# Patient Record
Sex: MALE | Race: ASIAN | NOT HISPANIC OR LATINO | Employment: OTHER | URBAN - METROPOLITAN AREA
[De-identification: names, ages, dates, MRNs, and addresses within clinical notes are randomized per-mention and may not be internally consistent; named-entity substitution may affect disease eponyms.]

---

## 2017-04-25 ENCOUNTER — OFFICE VISIT (OUTPATIENT)
Dept: INTERNAL MEDICINE | Facility: CLINIC | Age: 72
End: 2017-04-25
Payer: COMMERCIAL

## 2017-04-25 VITALS
TEMPERATURE: 98.4 F | RESPIRATION RATE: 16 BRPM | HEIGHT: 66 IN | WEIGHT: 131.6 LBS | BODY MASS INDEX: 21.15 KG/M2 | DIASTOLIC BLOOD PRESSURE: 76 MMHG | HEART RATE: 75 BPM | OXYGEN SATURATION: 95 % | SYSTOLIC BLOOD PRESSURE: 138 MMHG

## 2017-04-25 DIAGNOSIS — M17.0 PRIMARY OSTEOARTHRITIS OF BOTH KNEES: Primary | ICD-10-CM

## 2017-04-25 DIAGNOSIS — M25.511 CHRONIC PAIN OF BOTH SHOULDERS: ICD-10-CM

## 2017-04-25 DIAGNOSIS — F33.41 RECURRENT MAJOR DEPRESSIVE DISORDER, IN PARTIAL REMISSION (H): ICD-10-CM

## 2017-04-25 DIAGNOSIS — M25.512 CHRONIC PAIN OF BOTH SHOULDERS: ICD-10-CM

## 2017-04-25 DIAGNOSIS — E11.9 TYPE 2 DIABETES MELLITUS WITHOUT COMPLICATION, WITHOUT LONG-TERM CURRENT USE OF INSULIN (H): ICD-10-CM

## 2017-04-25 DIAGNOSIS — G89.29 CHRONIC PAIN OF BOTH SHOULDERS: ICD-10-CM

## 2017-04-25 PROCEDURE — 99214 OFFICE O/P EST MOD 30 MIN: CPT | Performed by: INTERNAL MEDICINE

## 2017-04-25 RX ORDER — SERTRALINE HYDROCHLORIDE 100 MG/1
100 TABLET, FILM COATED ORAL DAILY
Qty: 90 TABLET | Refills: 3 | Status: SHIPPED | OUTPATIENT
Start: 2017-04-25 | End: 2018-04-21

## 2017-04-25 RX ORDER — NAPROXEN 500 MG/1
500 TABLET ORAL 2 TIMES DAILY PRN
Qty: 30 TABLET | Refills: 1 | Status: SHIPPED | OUTPATIENT
Start: 2017-04-25 | End: 2017-08-01

## 2017-04-25 RX ORDER — ACETAMINOPHEN 325 MG/1
650 TABLET ORAL EVERY 6 HOURS PRN
Qty: 100 TABLET | Refills: 3 | Status: SHIPPED | OUTPATIENT
Start: 2017-04-25 | End: 2023-09-15

## 2017-04-25 NOTE — PROGRESS NOTES
SUBJECTIVE:                                                    Josh Gonzalez is a 71 year old male who presents to clinic today for the following health issues:    left hand by thumb- notice 4-5 weeks no pain    Has a small lump on the left hand - between the thumb and index finger. No pain, no skin change.    Depression Followup    Status since last visit: Stable     See PHQ-9 for current symptoms.  Other associated symptoms: None    Complicating factors:   Significant life event:  No   Current substance abuse:  None  Anxiety or Panic symptoms:  No    PHQ-9  English PHQ-9   Any Language        Has h/o depression. On medical treatment, controlled, no side effects. No depressive symptoms or suicidal ideation.    Has H/O DM. On diet , exercise . Blood sugars are controlled. No parestesias. No hypoglycemias.    Has h/o OA of the knees, back , shoulders. On Tylenol and Naproxen as needed. Controlled.       Amount of exercise or physical activity: None    Problems taking medications regularly: No    Medication side effects: none    Diet: regular (no restrictions)            Problem list and histories reviewed & adjusted, as indicated.  Additional history: as documented    Patient Active Problem List   Diagnosis     Advanced directives, counseling/discussion     Hyperlipidemia LDL goal <160     Health Care Home     Major depression     KULWANT (obstructive sleep apnea)     Gait instability     Mixed incontinence     Type 2 diabetes mellitus without complication, without long-term current use of insulin (H)     History reviewed. No pertinent surgical history.    Social History   Substance Use Topics     Smoking status: Never Smoker     Smokeless tobacco: Never Used     Alcohol use No     Family History   Problem Relation Age of Onset     Unknown/Adopted Mother      Unknown/Adopted Father          Current Outpatient Prescriptions   Medication Sig Dispense Refill     acetaminophen (TYLENOL) 325 MG tablet Take 2 tablets (650 mg)  "by mouth every 6 hours as needed for mild pain 100 tablet 3     naproxen (NAPROSYN) 500 MG tablet Take 1 tablet (500 mg) by mouth 2 times daily as needed for moderate pain 30 tablet 1     sertraline (ZOLOFT) 100 MG tablet Take 1 tablet (100 mg) by mouth daily 90 tablet 3     calcium-vitamin D (CALTRATE) 600-400 MG-UNIT per tablet Take 1 tablet by mouth 2 times daily 180 tablet 3     ORDER FOR DME Equipment being ordered: CPAP supplies (mask, tubing etc.) 1 Device 5     [DISCONTINUED] sertraline (ZOLOFT) 100 MG tablet Take 1 tablet (100 mg) by mouth daily 90 tablet 3       Reviewed and updated as needed this visit by clinical staff  Tobacco  Allergies  Meds  Med Hx  Surg Hx  Fam Hx  Soc Hx      Reviewed and updated as needed this visit by Provider         ROS:  Constitutional, HEENT, cardiovascular, pulmonary, gi and gu systems are negative, except as otherwise noted.    OBJECTIVE:                                                    /76  Pulse 75  Temp 98.4  F (36.9  C) (Oral)  Resp 16  Ht 5' 5.5\" (1.664 m)  Wt 131 lb 9.6 oz (59.7 kg)  SpO2 95%  BMI 21.57 kg/m2  Body mass index is 21.57 kg/(m^2).  GENERAL: healthy, alert and no distress  NECK: no adenopathy, no asymmetry, masses, or scars and thyroid normal to palpation  RESP: lungs clear to auscultation - no rales, rhonchi or wheezes  CV: regular rate and rhythm, normal S1 S2, no S3 or S4, no murmur, click or rub, no peripheral edema and peripheral pulses strong  ABDOMEN: soft, nontender, no hepatosplenomegaly, no masses and bowel sounds normal  MS: no gross musculoskeletal defects noted, no edema  Left hand subcutaneous mobile nodule , 1 cm, non tender in the intertriginous 1-2 fingers area     Diagnostic Test Results:  none      ASSESSMENT/PLAN:                                                      Problem List Items Addressed This Visit     Major depression    Relevant Medications    sertraline (ZOLOFT) 100 MG tablet    Type 2 diabetes mellitus " without complication, without long-term current use of insulin (H)      Other Visit Diagnoses     Primary osteoarthritis of both knees    -  Primary    Relevant Medications    acetaminophen (TYLENOL) 325 MG tablet    naproxen (NAPROSYN) 500 MG tablet    calcium-vitamin D (CALTRATE) 600-400 MG-UNIT per tablet    Chronic pain of both shoulders        Relevant Medications    acetaminophen (TYLENOL) 325 MG tablet    naproxen (NAPROSYN) 500 MG tablet    calcium-vitamin D (CALTRATE) 600-400 MG-UNIT per tablet           Cont treatment   Controlled depression   Controlled pain/ OA  Consider ACE, ARB, statin   Lab in 3 months   Monitor BP     Follow-Up:in 3 months     Ray Sun MD  [facility]

## 2017-04-25 NOTE — MR AVS SNAPSHOT
"              After Visit Summary   4/25/2017    Josh Gonzalez    MRN: [id number]           Patient Information     Date Of Birth          1945        Visit Information        Provider Department      4/25/2017 10:45 AM Ray Sun MD; [employer] [facility]        Today's Diagnoses     Type 2 diabetes mellitus without complication, without long-term current use of insulin (H)    -  1    Primary osteoarthritis of both knees        Chronic pain of both shoulders        Recurrent major depressive disorder, in partial remission (H)          Care Instructions    Fasting for next visit in 3 months         Follow-ups after your visit        Follow-up notes from your care team     Return in about 3 months (around 7/25/2017) for Physical Exam.      Who to contact     If you have questions or need follow up information about today's clinic visit or your schedule please contact [facility] directly at [phone].  Normal or non-critical lab and imaging results will be communicated to you by MyChart, letter or phone within 4 business days after the clinic has received the results. If you do not hear from us within 7 days, please contact the clinic through MyChart or phone. If you have a critical or abnormal lab result, we will notify you by phone as soon as possible.  Submit refill requests through [employer] or call your pharmacy and they will forward the refill request to us. Please allow 3 business days for your refill to be completed.          Additional Information About Your Visit        MyChart Information     AMENDIA lets you send messages to your doctor, view your test results, renew your prescriptions, schedule appointments and more. To sign up, go to [email or web address] . Click on \"Log in\" on the left side of the screen, which will take you to the Welcome page. Then click on \"Sign up Now\" on the right side of the page.     You will be asked to enter " "the access code listed below, as well as some personal information. Please follow the directions to create your username and password.     Your access code is: [email or web address]  Expires: 2017 11:25 AM     Your access code will  in 90 days. If you need help or a new code, please call your [place] clinic or [phone].        Care EveryWhere ID     This is your Care EveryWhere ID. This could be used by other organizations to access your [place] medical records  [id number]        Your Vitals Were     Pulse Temperature Respirations Height Pulse Oximetry BMI (Body Mass Index)    75 98.4  F (36.9  C) (Oral) 16 5' 5.5\" (1.664 m) 95% 21.57 kg/m2       Blood Pressure from Last 3 Encounters:   17 138/76   16 110/66   11/17/15 105/70    Weight from Last 3 Encounters:   17 131 lb 9.6 oz (59.7 kg)   16 131 lb 14.4 oz (59.8 kg)   11/17/15 131 lb (59.4 kg)              Today, you had the following     No orders found for display         Where to get your medicines      These medications were sent to [id number] - [place], [place] - [address]  [address] [place] [address] [address], [place] [address]     Phone:  [phone]     acetaminophen 325 MG tablet    calcium-vitamin D 600-400 MG-UNIT per tablet    naproxen 500 MG tablet    sertraline 100 MG tablet          Primary Care Provider Office Phone # Fax #    Ray Sun [phone]       [facility] [address] [place] [id number]        Thank you!     Thank you for choosing [facility]  for your care. Our goal is always to provide you with excellent care. Hearing back from our patients is one way we can continue to improve our services. Please take a few minutes to complete the written survey that you may receive in the mail after your visit with us. Thank you!             Your Updated Medication List - Protect others around you: Learn how to safely use, store and throw away your " medicines at www.disposemymeds.org.          This list is accurate as of: 4/25/17 11:25 AM.  Always use your most recent med list.                   Brand Name Dispense Instructions for use    acetaminophen 325 MG tablet    TYLENOL    100 tablet    Take 2 tablets (650 mg) by mouth every 6 hours as needed for mild pain       calcium-vitamin D 600-400 MG-UNIT per tablet    CALTRATE    180 tablet    Take 1 tablet by mouth 2 times daily       naproxen 500 MG tablet    NAPROSYN    30 tablet    Take 1 tablet (500 mg) by mouth 2 times daily as needed for moderate pain       order for DME     1 Device    Equipment being ordered: CPAP supplies (mask, tubing etc.)       sertraline 100 MG tablet    ZOLOFT    90 tablet    Take 1 tablet (100 mg) by mouth daily

## 2017-04-25 NOTE — NURSING NOTE
"Chief Complaint   Patient presents with     Mass     left hand by thumb- notice 4-5 weeks no pain     Recheck Medication     refill and depression f/u LOV 07/05/2016       Initial /76  Pulse 75  Temp 98.4  F (36.9  C) (Oral)  Resp 16  Ht 5' 5.5\" (1.664 m)  Wt 131 lb 9.6 oz (59.7 kg)  SpO2 95%  BMI 21.57 kg/m2 Estimated body mass index is 21.57 kg/(m^2) as calculated from the following:    Height as of this encounter: 5' 5.5\" (1.664 m).    Weight as of this encounter: 131 lb 9.6 oz (59.7 kg).  Medication Reconciliation: complete      Db Calix CMA      "

## 2017-04-26 ASSESSMENT — PATIENT HEALTH QUESTIONNAIRE - PHQ9: SUM OF ALL RESPONSES TO PHQ QUESTIONS 1-9: 11

## 2017-05-02 ENCOUNTER — CARE COORDINATION (OUTPATIENT)
Dept: GERIATRIC MEDICINE | Facility: CLINIC | Age: 72
End: 2017-05-02

## 2017-05-02 NOTE — PROGRESS NOTES
Per Olive, arranged transportation thru [facility] for the below appt:  Appt Date: 5/6, 5/13, 5/20, 5/27  Clinic Name:  [facility] - [address] [place] [address]  Transportation Provider: Town, Yellow, Kim or Lucas   time:  9:15am - 9:45am    Notified Sruthi,  of  time.    Sangita Crowe  Case Management Specialist  [facility]  [phone]

## 2017-05-25 ENCOUNTER — CARE COORDINATION (OUTPATIENT)
Dept: GERIATRIC MEDICINE | Facility: CLINIC | Age: 72
End: 2017-05-25

## 2017-05-25 NOTE — PROGRESS NOTES
[facility] Six-Month Telephone Assessment    6 month telephone assessment completed on 5/25/17 with client's wife Lissette..    ER visits: No  Hospitalizations: No  TCU stays: No  Significant health status changes: no change.  Has annual wellness visit scheduled 8/1.  Gave clients' wife this information..  Client is getting acupuncture for pain.  Falls/Injuries: Yes: Wife reports one fall last month  ADL/IADL changes: No  Changes in services: No    Goals: See POC in chart for goal progress documentation.     Caregiver Assessment follow up:  na    Will see client in 6 months for an annual health risk assessment.   Encouraged client to call CM with any questions or concerns in the meantime.     VIRGINIA Durbin, CCM  [facility]   Tel [phone]  Fax [phone]

## 2017-06-30 ENCOUNTER — CARE COORDINATION (OUTPATIENT)
Dept: GERIATRIC MEDICINE | Facility: CLINIC | Age: 72
End: 2017-06-30

## 2017-06-30 NOTE — PROGRESS NOTES
Received voice mail message from Divina at [facility] stating client needs a two wheeled walker (not deluxe walker just standard with 2 wheels) as client is unsteady and no longer safe with his cane.  Left message back for Divina letting her know that CC will order and vendor will get order from the PCP.  Emailed order to [facility].  VIRGINIA Durbin, [facility]   Tel [phone]  Fax [phone]

## 2017-07-03 ENCOUNTER — TELEPHONE (OUTPATIENT)
Dept: INTERNAL MEDICINE | Facility: CLINIC | Age: 72
End: 2017-07-03

## 2017-07-03 NOTE — TELEPHONE ENCOUNTER
Fax received from [facility] for review and signature.  Put in Dr. juan's in basket in Dr. Sun's absence,

## 2017-07-10 NOTE — TELEPHONE ENCOUNTER
Hi,  I am client's  from [facility].  I ordered the walker for this client.  I was called by his PCA and home care agency stating that he was unsteady with his cane and they had tried a wheeled walker with him and they said he was more steady with it.  Just wanted to let you know in case you are wondering where this request came from.  Thanks  VIRGINIA Durbin, [facility]   Tel [phone]  Fax [phone]

## 2017-07-17 ENCOUNTER — TELEPHONE (OUTPATIENT)
Dept: INTERNAL MEDICINE | Facility: CLINIC | Age: 72
End: 2017-07-17

## 2017-07-17 NOTE — TELEPHONE ENCOUNTER
Fax received from [facility] for review and signature.  Put in Dr. Ahumada's in basket in Dr. Sun's absence.

## 2017-07-27 NOTE — PROGRESS NOTES
Per APA, 2WW was delivered 7/21.  CC notified.  Elenita Lowery  Case Management Specialist  [facility]  [phone]

## 2017-08-01 ENCOUNTER — OFFICE VISIT (OUTPATIENT)
Dept: INTERNAL MEDICINE | Facility: CLINIC | Age: 72
End: 2017-08-01
Payer: COMMERCIAL

## 2017-08-01 VITALS
TEMPERATURE: 97.9 F | HEART RATE: 64 BPM | DIASTOLIC BLOOD PRESSURE: 78 MMHG | OXYGEN SATURATION: 99 % | BODY MASS INDEX: 20.09 KG/M2 | HEIGHT: 66 IN | SYSTOLIC BLOOD PRESSURE: 144 MMHG | WEIGHT: 125 LBS

## 2017-08-01 DIAGNOSIS — E11.9 TYPE 2 DIABETES MELLITUS WITHOUT COMPLICATION, WITHOUT LONG-TERM CURRENT USE OF INSULIN (H): ICD-10-CM

## 2017-08-01 DIAGNOSIS — G89.29 CHRONIC PAIN OF BOTH SHOULDERS: ICD-10-CM

## 2017-08-01 DIAGNOSIS — R26.81 GAIT INSTABILITY: ICD-10-CM

## 2017-08-01 DIAGNOSIS — M17.0 PRIMARY OSTEOARTHRITIS OF BOTH KNEES: ICD-10-CM

## 2017-08-01 DIAGNOSIS — F33.41 RECURRENT MAJOR DEPRESSIVE DISORDER, IN PARTIAL REMISSION (H): ICD-10-CM

## 2017-08-01 DIAGNOSIS — Z00.00 ROUTINE GENERAL MEDICAL EXAMINATION AT A HEALTH CARE FACILITY: Primary | ICD-10-CM

## 2017-08-01 DIAGNOSIS — Z12.5 SCREENING FOR PROSTATE CANCER: ICD-10-CM

## 2017-08-01 DIAGNOSIS — M25.512 CHRONIC PAIN OF BOTH SHOULDERS: ICD-10-CM

## 2017-08-01 DIAGNOSIS — R97.20 ELEVATED PROSTATE SPECIFIC ANTIGEN (PSA): ICD-10-CM

## 2017-08-01 DIAGNOSIS — M25.511 CHRONIC PAIN OF BOTH SHOULDERS: ICD-10-CM

## 2017-08-01 LAB
ERYTHROCYTE [DISTWIDTH] IN BLOOD BY AUTOMATED COUNT: 15.3 % (ref 10–15)
HBA1C MFR BLD: 5.8 % (ref 4.3–6)
HCT VFR BLD AUTO: 46.6 % (ref 40–53)
HGB BLD-MCNC: 14.6 G/DL (ref 13.3–17.7)
MCH RBC QN AUTO: 24.4 PG (ref 26.5–33)
MCHC RBC AUTO-ENTMCNC: 31.3 G/DL (ref 31.5–36.5)
MCV RBC AUTO: 78 FL (ref 78–100)
PLATELET # BLD AUTO: 250 10E9/L (ref 150–450)
RBC # BLD AUTO: 5.99 10E12/L (ref 4.4–5.9)
WBC # BLD AUTO: 6.5 10E9/L (ref 4–11)

## 2017-08-01 PROCEDURE — 82043 UR ALBUMIN QUANTITATIVE: CPT | Performed by: INTERNAL MEDICINE

## 2017-08-01 PROCEDURE — 80061 LIPID PANEL: CPT | Performed by: INTERNAL MEDICINE

## 2017-08-01 PROCEDURE — 99397 PER PM REEVAL EST PAT 65+ YR: CPT | Performed by: INTERNAL MEDICINE

## 2017-08-01 PROCEDURE — 36415 COLL VENOUS BLD VENIPUNCTURE: CPT | Performed by: INTERNAL MEDICINE

## 2017-08-01 PROCEDURE — 84443 ASSAY THYROID STIM HORMONE: CPT | Performed by: INTERNAL MEDICINE

## 2017-08-01 PROCEDURE — 99213 OFFICE O/P EST LOW 20 MIN: CPT | Mod: 25 | Performed by: INTERNAL MEDICINE

## 2017-08-01 PROCEDURE — 85027 COMPLETE CBC AUTOMATED: CPT | Performed by: INTERNAL MEDICINE

## 2017-08-01 PROCEDURE — 83036 HEMOGLOBIN GLYCOSYLATED A1C: CPT | Performed by: INTERNAL MEDICINE

## 2017-08-01 PROCEDURE — G0103 PSA SCREENING: HCPCS | Performed by: INTERNAL MEDICINE

## 2017-08-01 PROCEDURE — 80053 COMPREHEN METABOLIC PANEL: CPT | Performed by: INTERNAL MEDICINE

## 2017-08-01 RX ORDER — NAPROXEN 500 MG/1
500 TABLET ORAL 2 TIMES DAILY PRN
Qty: 30 TABLET | Refills: 3 | Status: SHIPPED | OUTPATIENT
Start: 2017-08-01 | End: 2019-11-18

## 2017-08-01 NOTE — LETTER
[facility]  [address], [address]  [place] [address]  [phone]        August 9, 2017    Josh Jonatanrachelle Aggarwali  [address] [address] [address]          Dear Mr. Josh Gonzalez:      The results of your recent labs shows you have slightly elevated Cholesterol. Please, keep with low fat diet and regular exercise. PSA was elevated ( prostate exam), please, follow up with Urologist ( referral was mailed, please call the number for an appointment). The rest of the results were normal.          Sincerely,        Ray Sun M.D.

## 2017-08-01 NOTE — PROGRESS NOTES
SUBJECTIVE:   Josh Gonzalez is a 71 year old male who presents for Preventive Visit.      Are you in the first 12 months of your Medicare Part B coverage?  No    Healthy Habits:    Do you get at least three servings of calcium containing foods daily (dairy, green leafy vegetables, etc.)? Yes, greens only    Amount of exercise or daily activities, outside of work: daily walk    Problems taking medications regularly No    Medication side effects: No    Have you had an eye exam in the past two years? no    Do you see a dentist twice per year? Once a year    Do you have sleep apnea, excessive snoring or daytime drowsiness?yes, sleep Apnea    COGNITIVE SCREEN  1) Repeat 3 items (Banana, Sunrise, Chair)    2) Clock draw: NORMAL  3) 3 item recall: Recalls NO objects   Results: 0 items recalled: PROBABLE COGNITIVE IMPAIRMENT, **INFORM PROVIDER**    Mini-CogTM Copyright S Dilan. Licensed by the author for use in [facility]; reprinted with permission (mckayla@[facility]). All rights reserved.              PROBLEMS TO ADD ON...  Has H/O DM. On diet , exercise . Blood sugars are controlled. No parestesias. No hypoglycemias.  Has h/o depression. On medical treatment, controlled, no side effects.has chronic  depressive symptoms , no suicidal ideation.  Has h/o OA of the knees, shoulders. Has poor balance and h/o falls. Uses a cane. Has restricted ambulation because of knee pains.   Concern for declining memory, poor appetite. Mild weight loss.     Reviewed and updated as needed this visit by clinical staff         Reviewed and updated as needed this visit by Provider      Social History   Substance Use Topics     Smoking status: Never Smoker     Smokeless tobacco: Never Used     Alcohol use No       The patient does not drink >3 drinks per day nor >7 drinks per week.    Today's PHQ-2 Score:   PHQ-2 ( 1999 [employer]) 4/25/2017 12/13/2016   Q1: Little interest or pleasure in doing things 0 1   Q2: Feeling down, depressed or  hopeless 0 1   PHQ-2 Score 0 2     Assess lab   Handicapped papers filled in     Do you feel safe in your environment - Yes    Do you have a Health Care Directive?: No: Advance care planning was reviewed with patient; patient declined at this time.      Current providers sharing in care for this patient include: Patient Care Team:  Ray Sun MD as PCP - General (Internal Medicine)  Rochelle Adam LSW as   Reyna Sutton as Other (see comments)  Chyna Toth      Hearing impairment: No    Ability to successfully perform activities of daily living: Yes, no assistance needed     Fall risk:         Home safety:  none identified      The following health maintenance items are reviewed in Epic and correct as of today:Health Maintenance   Topic Date Due     FOOT EXAM Q1 YEAR  09/24/1946     EYE EXAM Q1 YEAR  09/24/1946     MICROALBUMIN Q1 YEAR  09/24/1946     DEPRESSION ACTION PLAN Q1 YR  09/24/1962     AORTIC ANEURYSM SCREENING (SYSTEM ASSIGNED)  09/24/2009     LIPID MONITORING Q1 YEAR  11/01/2015     A1C Q6 MO  01/05/2017     CREATININE Q1 YEAR  07/05/2017     INFLUENZA VACCINE (SYSTEM ASSIGNED)  09/01/2017     PHQ-9 Q6 MONTHS  10/25/2017     TSH W/ FREE T4 REFLEX Q2 YEAR  11/17/2017     FALL RISK ASSESSMENT  04/25/2018     COLONOSCOPY Q10 YR  11/01/2018     ADVANCE DIRECTIVE PLANNING Q5 YRS  12/13/2021     TETANUS IMMUNIZATION (SYSTEM ASSIGNED)  01/11/2022     PNEUMOCOCCAL  Completed     Labs reviewed in EPIC    Pneumonia Vaccine:Adults age 65+ who received Pneumovax (PPSV23) at 65 years or older: Should be given PCV13 > 1 year after their most recent PPSV23    ROS:  C: NEGATIVE for fever, chills, change in weight  I: NEGATIVE for worrisome rashes, moles or lesions  E: NEGATIVE for vision changes or irritation  E/M: NEGATIVE for ear, mouth and throat problems  R: NEGATIVE for significant cough or SOB  B: NEGATIVE for masses, tenderness or discharge  CV: NEGATIVE for chest pain, palpitations or  "peripheral edema  GI: NEGATIVE for nausea, abdominal pain, heartburn, or change in bowel habits  : NEGATIVE for frequency, dysuria, or hematuria  M: +for significant arthralgias in knees   N: NEGATIVE for weakness, dizziness or paresthesias, + falls   E: NEGATIVE for temperature intolerance, skin/hair changes  H: NEGATIVE for bleeding problems  P: NEGATIVE for changes in mood or affect, + memory problems and depressed mood     OBJECTIVE:   There were no vitals taken for this visit. Estimated body mass index is 21.57 kg/(m^2) as calculated from the following:    Height as of 4/25/17: 5' 5.5\" (1.664 m).    Weight as of 4/25/17: 131 lb 9.6 oz (59.7 kg).  EXAM:   GENERAL: weak, frail, alert and no distress  EYES: Eyes grossly normal to inspection, PERRL and conjunctivae and sclerae normal  HENT: ear canals and TM's normal, nose and mouth without ulcers or lesions  NECK: no adenopathy, no asymmetry, masses, or scars and thyroid normal to palpation  RESP: lungs clear to auscultation - no rales, rhonchi or wheezes  CV: regular rate and rhythm, normal S1 S2, no S3 or S4, no murmur, click or rub, no peripheral edema and peripheral pulses strong  ABDOMEN: soft, nontender, no hepatosplenomegaly, no masses and bowel sounds normal  MS: no gross musculoskeletal defects noted, no edema, knees and shoulders palpatory tenderness   SKIN: no suspicious lesions or rashes  NEURO: generalized weakness, impaired short term memory , postural instability uses a cane   PSYCH: mentation appears normal    ASSESSMENT / PLAN:       ICD-10-CM    1. Routine general medical examination at a health care facility Z00.00 CBC with platelets     Comprehensive metabolic panel     TSH with free T4 reflex     Lipid panel reflex to direct LDL     Prostate spec antigen screen     Hemoglobin A1c     Albumin Random Urine Quantitative   2. Primary osteoarthritis of both knees M17.0 naproxen (NAPROSYN) 500 MG tablet     cholecalciferol (VITAMIN D) 1000 UNIT " "tablet     [facility]   3. Chronic pain of both shoulders M25.512 naproxen (NAPROSYN) 500 MG tablet    G89.29 cholecalciferol (VITAMIN D) 1000 UNIT tablet    M25.511 [facility]   4. Recurrent major depressive disorder, in partial remission (H) F33.41 [facility]   5. Type 2 diabetes mellitus without complication, without long-term current use of insulin (H) E11.9 CBC with platelets     Comprehensive metabolic panel     TSH with free T4 reflex     Lipid panel reflex to direct LDL     Hemoglobin A1c     Albumin Random Urine Quantitative     [facility]   6. Screening for prostate cancer Z12.5 Prostate spec antigen screen   7. Gait instability R26.81 OFFICE/OUTPT VISIT,EST,[facility] III       End of Life Planning:  Patient currently has an advanced directive: Yes.  Practitioner is supportive of decision.    COUNSELING:  Reviewed preventive health counseling, as reflected in patient instructions       Regular exercise       Healthy diet/nutrition       Vision screening       Hearing screening       Dental care       Colon cancer screening       Prostate cancer screening        Estimated body mass index is 21.57 kg/(m^2) as calculated from the following:    Height as of 4/25/17: 5' 5.5\" (1.664 m).    Weight as of 4/25/17: 131 lb 9.6 oz (59.7 kg).     reports that he has never smoked. He has never used smokeless tobacco.        Appropriate preventive services were discussed with this patient, including applicable screening as appropriate for cardiovascular disease, diabetes, osteopenia/osteoporosis, and glaucoma.  As appropriate for age/gender, discussed screening for colorectal cancer, prostate cancer, breast cancer, and cervical cancer. Checklist reviewing preventive services available has been given to the patient.    Reviewed patients plan of care and provided an AVS. The Intermediate Care Plan ( asthma action plan, low back pain action plan, " and migraine action plan) for Man meets the Care Plan requirement. This Care Plan has been established and reviewed with the Patient.    Counseling Resources:  ATP IV Guidelines  Pooled Cohorts Equation Calculator  Breast Cancer Risk Calculator  FRAX Risk Assessment  ICSI Preventive Guidelines  Dietary Guidelines for Americans, 2010  USDA's MyPlate  ASA Prophylaxis  Lung CA Screening    Ray Sun MD  [facility]

## 2017-08-01 NOTE — NURSING NOTE
"Chief Complaint   Patient presents with     Wellness Visit     Fasting Px       Initial /78  Pulse 64  Temp 97.9  F (36.6  C) (Oral)  Ht 5' 5.5\" (1.664 m)  Wt 125 lb (56.7 kg)  SpO2 99%  BMI 20.48 kg/m2 Estimated body mass index is 20.48 kg/(m^2) as calculated from the following:    Height as of this encounter: 5' 5.5\" (1.664 m).    Weight as of this encounter: 125 lb (56.7 kg).  Medication Reconciliation: complete   Jen Carvajal MA      "

## 2017-08-01 NOTE — MR AVS SNAPSHOT
After Visit Summary   8/1/2017    Josh Gonzalez    MRN: [id number]           Patient Information     Date Of Birth          1945        Visit Information        Provider Department      8/1/2017 9:45 AM Ray Sun MD; [employer] [facility]        Today's Diagnoses     Routine general medical examination at a health care facility    -  1    Primary osteoarthritis of both knees        Chronic pain of both shoulders        Recurrent major depressive disorder, in partial remission (H)        Type 2 diabetes mellitus without complication, without long-term current use of insulin (H)        Screening for prostate cancer        Gait instability          Care Instructions      Preventive Health Recommendations:       Male Ages 65 and over    Yearly exam:             See your health care provider every year in order to  o   Review health changes.   o   Discuss preventive care.    o   Review your medicines if your doctor has prescribed any.    Talk with your health care provider about whether you should have a test to screen for prostate cancer (PSA).    Every 3 years, have a diabetes test (fasting glucose). If you are at risk for diabetes, you should have this test more often.    Every 5 years, have a cholesterol test. Have this test more often if you are at risk for high cholesterol or heart disease.     Every 10 years, have a colonoscopy. Or, have a yearly FIT test (stool test). These exams will check for colon cancer.    Talk to with your health care provider about screening for Abdominal Aortic Aneurysm if you have a family history of AAA or have a history of smoking.  Shots:     Get a flu shot each year.     Get a tetanus shot every 10 years.     Talk to your doctor about your pneumonia vaccines. There are now two you should receive - Pneumovax (PPSV 23) and Prevnar (PCV 13).    Talk to your doctor about a shingles vaccine.     Talk to your doctor about the  "hepatitis B vaccine.  Nutrition:     Eat at least 5 servings of fruits and vegetables each day.     Eat whole-grain bread, whole-wheat pasta and brown rice instead of white grains and rice.     Talk to your doctor about Calcium and Vitamin D.   Lifestyle    Exercise for at least 150 minutes a week (30 minutes a day, 5 days a week). This will help you control your weight and prevent disease.     Limit alcohol to one drink per day.     No smoking.     Wear sunscreen to prevent skin cancer.     See your dentist every six months for an exam and cleaning.     See your eye doctor every 1 to 2 years to screen for conditions such as glaucoma, macular degeneration and cataracts.          Follow-ups after your visit        Who to contact     If you have questions or need follow up information about today's clinic visit or your schedule please contact [facility] directly at [phone].  Normal or non-critical lab and imaging results will be communicated to you by iStreamPlanethart, letter or phone within 4 business days after the clinic has received the results. If you do not hear from us within 7 days, please contact the clinic through iStreamPlanethart or phone. If you have a critical or abnormal lab result, we will notify you by phone as soon as possible.  Submit refill requests through Qwickly or call your pharmacy and they will forward the refill request to us. Please allow 3 business days for your refill to be completed.          Additional Information About Your Visit        Qwickly Information     Qwickly lets you send messages to your doctor, view your test results, renew your prescriptions, schedule appointments and more. To sign up, go to [email or web address] . Click on \"Log in\" on the left side of the screen, which will take you to the Welcome page. Then click on \"Sign up Now\" on the right side of the page.     You will be asked to enter the access code listed below, as well as some personal information. Please " "follow the directions to create your username and password.     Your access code is: [email or web address]  Expires: 10/30/2017 10:49 AM     Your access code will  in 90 days. If you need help or a new code, please call your [place] clinic or [phone].        Care EveryWhere ID     This is your Care EveryWhere ID. This could be used by other organizations to access your [place] medical records  [id number]        Your Vitals Were     Pulse Temperature Height Pulse Oximetry BMI (Body Mass Index)       64 97.9  F (36.6  C) (Oral) 5' 5.5\" (1.664 m) 99% 20.48 kg/m2        Blood Pressure from Last 3 Encounters:   17 144/78   17 138/76   16 110/66    Weight from Last 3 Encounters:   17 125 lb (56.7 kg)   17 131 lb 9.6 oz (59.7 kg)   16 131 lb 14.4 oz (59.8 kg)              We Performed the Following     Albumin Random Urine Quantitative     CBC with platelets     Comprehensive metabolic panel     Hemoglobin A1c     Lipid panel reflex to direct LDL     Prostate spec antigen screen     TSH with free T4 reflex          Today's Medication Changes          These changes are accurate as of: 17 10:49 AM.  If you have any questions, ask your nurse or doctor.               Start taking these medicines.        Dose/Directions    cholecalciferol 1000 UNIT tablet   Commonly known as:  vitamin D   Used for:  Primary osteoarthritis of both knees, Chronic pain of both shoulders   Started by:  Ray Sun MD        Dose:  1000 Units   Take 1 tablet (1,000 Units) by mouth daily   Quantity:  100 tablet   Refills:  3         Stop taking these medicines if you haven't already. Please contact your care team if you have questions.     calcium-vitamin D 600-400 MG-UNIT per tablet   Commonly known as:  CALTRATE   Stopped by:  Ray Sun MD                Where to get your medicines      These medications were sent to [id number] [place], [place] - [address]  [address], [facility] [address]     Phone:  [phone]     cholecalciferol 1000 UNIT tablet    naproxen 500 MG tablet                Primary Care Provider Office Phone # Fax #    Ray Sun [phone]       [facility] [address]  [facility] [address]        Equal Access to Services     ASHLEY SOLOMON : Hadii aad ku hadasho Soomaali, waaxda luqadaha, qaybta kaalmada adeegyada, waxay idiin hayaan adeeg kharash la'aan ah. So [facility] [phone].    ATENCIÓN: Si habla español, tiene a lilly disposición servicios gratuitos de asistencia lingüística. Llame al [phone].    We comply with applicable federal civil rights laws and [place] laws. We do not discriminate on the basis of race, color, national origin, age, disability sex, sexual orientation or gender identity.            Thank you!     Thank you for choosing [facility]  for your care. Our goal is always to provide you with excellent care. Hearing back from our patients is one way we can continue to improve our services. Please take a few minutes to complete the written survey that you may receive in the mail after your visit with us. Thank you!             Your Updated Medication List - Protect others around you: Learn how to safely use, store and throw away your medicines at www.disposemymeds.org.          This list is accurate as of: 8/1/17 10:49 AM.  Always use your most recent med list.                   Brand Name Dispense Instructions for use Diagnosis    acetaminophen 325 MG tablet    TYLENOL    100 tablet    Take 2 tablets (650 mg) by mouth every 6 hours as needed for mild pain    Primary osteoarthritis of both knees, Chronic pain of both shoulders       cholecalciferol 1000 UNIT tablet    vitamin D    100 tablet    Take 1 tablet (1,000 Units) by mouth daily    Primary osteoarthritis of both knees, Chronic pain of both shoulders       naproxen 500 MG tablet    NAPROSYN    30 tablet    Take 1  tablet (500 mg) by mouth 2 times daily as needed for moderate pain    Primary osteoarthritis of both knees, Chronic pain of both shoulders       order for DME     1 Device    Equipment being ordered: CPAP supplies (mask, tubing etc.)    Sleep apnea       sertraline 100 MG tablet    ZOLOFT    90 tablet    Take 1 tablet (100 mg) by mouth daily    Recurrent major depressive disorder, in partial remission (H)

## 2017-08-02 LAB
ALBUMIN SERPL-MCNC: 3.9 G/DL (ref 3.4–5)
ALP SERPL-CCNC: 81 U/L (ref 40–150)
ALT SERPL W P-5'-P-CCNC: 20 U/L (ref 0–70)
ANION GAP SERPL CALCULATED.3IONS-SCNC: 6 MMOL/L (ref 3–14)
AST SERPL W P-5'-P-CCNC: 21 U/L (ref 0–45)
BILIRUB SERPL-MCNC: 0.7 MG/DL (ref 0.2–1.3)
BUN SERPL-MCNC: 15 MG/DL (ref 7–30)
CALCIUM SERPL-MCNC: 9.5 MG/DL (ref 8.5–10.1)
CHLORIDE SERPL-SCNC: 103 MMOL/L (ref 94–109)
CHOLEST SERPL-MCNC: 218 MG/DL
CO2 SERPL-SCNC: 31 MMOL/L (ref 20–32)
CREAT SERPL-MCNC: 1.17 MG/DL (ref 0.66–1.25)
CREAT UR-MCNC: 26 MG/DL
GFR SERPL CREATININE-BSD FRML MDRD: 61 ML/MIN/1.7M2
GLUCOSE SERPL-MCNC: 92 MG/DL (ref 70–99)
HDLC SERPL-MCNC: 58 MG/DL
LDLC SERPL CALC-MCNC: 125 MG/DL
MICROALBUMIN UR-MCNC: 12 MG/L
MICROALBUMIN/CREAT UR: 46.95 MG/G CR (ref 0–17)
NONHDLC SERPL-MCNC: 160 MG/DL
POTASSIUM SERPL-SCNC: 4.5 MMOL/L (ref 3.4–5.3)
PROT SERPL-MCNC: 8.2 G/DL (ref 6.8–8.8)
PSA SERPL-ACNC: 5.14 UG/L (ref 0–4)
SODIUM SERPL-SCNC: 140 MMOL/L (ref 133–144)
TRIGL SERPL-MCNC: 176 MG/DL
TSH SERPL DL<=0.005 MIU/L-ACNC: 1.35 MU/L (ref 0.4–4)

## 2017-08-02 ASSESSMENT — PATIENT HEALTH QUESTIONNAIRE - PHQ9: SUM OF ALL RESPONSES TO PHQ QUESTIONS 1-9: 19

## 2017-09-11 ENCOUNTER — CARE COORDINATION (OUTPATIENT)
Dept: GERIATRIC MEDICINE | Facility: CLINIC | Age: 72
End: 2017-09-11

## 2017-09-11 NOTE — PROGRESS NOTES
Update on message sent below:  Dr Sun,   [facility] received your prescription for the Ensure but it only had a signature.  The documentation was not completed.  In addition,  [facility] has now decided to no longer supply nutritional products so you will be getting a new request from [employer].  I have spoke with Man and suggested he make an appointment to see you as he says he is losing weight and unable to eat.  Please let me know if you have questions.  Thank you  VIRGINIA Durbin, [facility]   Tel [phone]  Fax [phone]      Dr. Sun,  I am Josh Gonzalez's  from [facility].  He has requested Ensure.  He states he is losing weight due to dental work.  He currently is without teeth preparing to get dentures.  I ordered the Ensure from [facility].  They will be contacting you for orders.  Just wanted to make you aware of this.  Let me know if you have questions.  Thanks so much  VIRGINIA Durbin, [facility]   Tel [phone]  Fax [phone]

## 2017-09-11 NOTE — PROGRESS NOTES
Received voice mail message from [employer]  indicating client is losing weight-mainly due to not having any teeth while he is in the process of getting dentures.  He would like to get Ensure.  Ordered Ensure from [facility]: 1 per day-half chocolate and half strawberry.  Called and spoke with wife with [employer] phone  and updated her re: above.  Told her that [facility] will need a order from the PCP prior to supplying it.  CM sent EPIC note to PCP.  VIRGINIA Durbin, [facility]   Tel [phone]  Fax [phone]

## 2017-09-12 ENCOUNTER — OFFICE VISIT (OUTPATIENT)
Dept: UROLOGY | Facility: CLINIC | Age: 72
End: 2017-09-12
Payer: COMMERCIAL

## 2017-09-12 ENCOUNTER — TELEPHONE (OUTPATIENT)
Dept: INTERNAL MEDICINE | Facility: CLINIC | Age: 72
End: 2017-09-12

## 2017-09-12 VITALS
SYSTOLIC BLOOD PRESSURE: 110 MMHG | HEIGHT: 66 IN | HEART RATE: 60 BPM | BODY MASS INDEX: 19.29 KG/M2 | DIASTOLIC BLOOD PRESSURE: 60 MMHG | WEIGHT: 120 LBS

## 2017-09-12 DIAGNOSIS — R97.20 ELEVATED PROSTATE SPECIFIC ANTIGEN (PSA): Primary | ICD-10-CM

## 2017-09-12 DIAGNOSIS — R35.0 URINARY FREQUENCY: ICD-10-CM

## 2017-09-12 LAB
ALBUMIN UR-MCNC: NEGATIVE MG/DL
APPEARANCE UR: CLEAR
BILIRUB UR QL STRIP: NEGATIVE
COLOR UR AUTO: YELLOW
GLUCOSE UR STRIP-MCNC: NEGATIVE MG/DL
HGB UR QL STRIP: NEGATIVE
KETONES UR STRIP-MCNC: NEGATIVE MG/DL
LEUKOCYTE ESTERASE UR QL STRIP: NEGATIVE
NITRATE UR QL: NEGATIVE
PH UR STRIP: 7 PH (ref 5–7)
RESIDUAL VOLUME (RV) (EXTERNAL): 190
SOURCE: NORMAL
SP GR UR STRIP: 1.01 (ref 1–1.03)
UROBILINOGEN UR STRIP-ACNC: 0.2 EU/DL (ref 0.2–1)

## 2017-09-12 PROCEDURE — 51798 US URINE CAPACITY MEASURE: CPT | Performed by: UROLOGY

## 2017-09-12 PROCEDURE — 99203 OFFICE O/P NEW LOW 30 MIN: CPT | Mod: 25 | Performed by: UROLOGY

## 2017-09-12 PROCEDURE — 81003 URINALYSIS AUTO W/O SCOPE: CPT | Performed by: UROLOGY

## 2017-09-12 RX ORDER — TAMSULOSIN HYDROCHLORIDE 0.4 MG/1
0.4 CAPSULE ORAL DAILY
Qty: 30 CAPSULE | Refills: 3 | Status: SHIPPED | OUTPATIENT
Start: 2017-09-12 | End: 2017-12-27

## 2017-09-12 ASSESSMENT — PAIN SCALES - GENERAL: PAINLEVEL: MILD PAIN (2)

## 2017-09-12 NOTE — TELEPHONE ENCOUNTER
Fax received from [facility] for review and signature.  Put in Dr. Eckert's in basket.

## 2017-09-12 NOTE — LETTER
9/12/2017      RE: Josh Gonzalez  [address] [address]             Chief Complaint:   Elevated PSA         Consult or Referral:     MrShazia Gonzalez is a 71 year old male seen in consultation from Dr. Sun.         History of Present Illness:   Josh Gonzalez is a very pleasant 71 year old male who presents with a history of Elevated PSA (Prostate Specific Antigen).  Patient has slowly progressive lower urinary tract symptoms. Notes weak stream, incomplete emptying, urgency frequency with occasional urge incontinence. Nocturia x 3-4. No hematuria. No dysuria. No previous UTIs. No urologic history. Noted to have elevated PSA at time of last physical.         Past Medical History:     Past Medical History:   Diagnosis Date     Depression      Mixed incontinence 12/20/2016     Myalgia      KULWANT on CPAP      Postnasal drip      ROTATOR CUFF (CAPSULE) SPRAIN           Past Surgical History:   History reviewed. No pertinent surgical history.         Medications     Current Outpatient Prescriptions   Medication     naproxen (NAPROSYN) 500 MG tablet     cholecalciferol (VITAMIN D) 1000 UNIT tablet     acetaminophen (TYLENOL) 325 MG tablet     sertraline (ZOLOFT) 100 MG tablet     ORDER FOR DME     No current facility-administered medications for this visit.           Family History:     Family History   Problem Relation Age of Onset     Unknown/Adopted Mother      Unknown/Adopted Father    No history of  malignancy  No family history prostate cancer         Social History:     Social History     Social History     Marital status:      Spouse name: N/A     Number of children: 4     Years of education: N/A     Occupational History      Unemployed     Social History Main Topics     Smoking status: Former Smoker     Types: Cigarettes     Quit date: 9/12/2001     Smokeless tobacco: Never Used     Alcohol use No     Drug use: No     Sexual activity: No     Other Topics Concern     Blood  "Transfusions No     Caffeine Concern No     Occupational Exposure No     Hobby Hazards No     Sleep Concern No     Stress Concern No     Weight Concern No     Special Diet No     Back Care Yes     LBP- sciatica      Exercise Yes     walking      Seat Belt Yes     Social History Narrative          Allergies:   Review of patient's allergies indicates no known allergies.         Review of Systems:  From intake questionnaire     Negative 14 system review except as noted on HPI, nurse's note.         Physical Exam:     Patient is a 71 year old  male   Vitals: Blood pressure 110/60, pulse 60, height 1.664 m (5' 5.5\"), weight 54.4 kg (120 lb).  General Appearance Adult: Body mass index is 19.67 kg/(m^2).,   Alert, no acute distress, oriented  HENT: throat/mouth:normal, good dentition  Neck: No adenopathy,masses or thyromegaly  Lungs: no respiratory distress, or pursed lip breathing  Heart: No obvious jugular venous distension present  Abdomen: soft, nontender, no organomegaly or masses,   Musculoskeltal: extremities normal, no peripheral edema  Skin: no suspicious lesions or rashes  Neuro: Alert, oriented, speech and mentation normal  Psych: affect and mood normal  Gait: Normal  : penis, scrotum, testes normal  JUAN anodular, symmetric - 40 cc    PVR = 190 ml      Labs and Pathology:    I reviewed all applicable laboratory and pathology data and went over findings with patient  Significant for   Lab Results   Component Value Date    CR 1.17 08/01/2017    CR 1.02 07/05/2016    CR 1.12 08/04/2015    CR 1.17 11/01/2014    CR 1.31 09/04/2013     PSA   Date Value Ref Range Status   08/01/2017 5.14 (H) 0 - 4 ug/L Final     Comment:     Assay Method:  Chemiluminescence using Siemens Vista analyzer   11/01/2014 3.74 0 - 4 ug/L Final   04/02/2013 2.53 0 - 4 ug/L Final   11/29/2011 2.16 0 - 4 ug/L Final       Outside and Past Medical records:    I spent 10 minutes reviewing outside and past medical records.         Assessment and " Plan:     Assessment: 71 year old male with BPH and LUTS as well as elevated PSA. Discussed natural history of BPH and treatment options. Patient would like trial of tamsulosin. Discussed risks/benefits/side effects of medication.    We subsequently had a long discussion about the utility of PSA screening.  We talked about the Pros and the Cons.  We discussed the findings of the PLCO and EORTC studies.  We discussed the results of the Bill-[place]el[place] Scandinavian trial of treatment vs. Observation in clinically detected prostate cancer as well as the results of the PIVOT trial in the context of screen-detected cancer.  We discussed the recommendations by the AUA, ASC and USPTSF. We also discussed the significant (2-6%) and rising risk of biopsy associated sepsis.    We plugged his numbers into the PCPT individualized risk calculator and found   the risk of a negative biopsy is 69%  The risk of low risk cancer is 21%  The risk of intermediate to high risk is 10%    In the end given the overall relatively low PSA and normal JUAN, we felt that there was not strong evidence that a biopsy would be beneficial at this point, understanding that there is a small chance that a biologically important cancer may be missed.    He would like to try tamsulosin and recheck PSA at his next follow-up.    Plan:  Tamsulosin 0.4mg daily  Follow-up in 3 months for symptom review, PVR, and labs    Orders  Orders Placed This Encounter   Procedures     MEASURE POST-VOID RESIDUAL URINE/BLADDER CAPACITY, US NON-IMAGING (64652)     UA without Microscopic     PSA total and free [[id number]]     Wilman Chacko MD  Urology  [facility]  Pager [phone]      Wilman Chacko MD

## 2017-09-12 NOTE — LETTER
9/12/2017       RE: Josh Gonzalez  [address] [address]     Dear Colleague,    Thank you for referring your patient, Josh Gonzalez, to the [facility] [place] at [facility]. Please see a copy of my visit note below.          Chief Complaint:   Elevated PSA         Consult or Referral:     Mr. Josh Gonzalez is a 71 year old male seen in consultation from Dr. Sun.         History of Present Illness:   Josh Gonzalez is a very pleasant 71 year old male who presents with a history of Elevated PSA (Prostate Specific Antigen).  Patient has slowly progressive lower urinary tract symptoms. Notes weak stream, incomplete emptying, urgency frequency with occasional urge incontinence. Nocturia x 3-4. No hematuria. No dysuria. No previous UTIs. No urologic history. Noted to have elevated PSA at time of last physical.         Past Medical History:     Past Medical History:   Diagnosis Date     Depression      Mixed incontinence 12/20/2016     Myalgia      KULWANT on CPAP      Postnasal drip      ROTATOR CUFF (CAPSULE) SPRAIN           Past Surgical History:   History reviewed. No pertinent surgical history.         Medications     Current Outpatient Prescriptions   Medication     naproxen (NAPROSYN) 500 MG tablet     cholecalciferol (VITAMIN D) 1000 UNIT tablet     acetaminophen (TYLENOL) 325 MG tablet     sertraline (ZOLOFT) 100 MG tablet     ORDER FOR DME     No current facility-administered medications for this visit.           Family History:     Family History   Problem Relation Age of Onset     Unknown/Adopted Mother      Unknown/Adopted Father    No history of  malignancy  No family history prostate cancer         Social History:     Social History     Social History     Marital status:      Spouse name: N/A     Number of children: 4     Years of education: N/A     Occupational History      Unemployed     Social History  "Main Topics     Smoking status: Former Smoker     Types: Cigarettes     Quit date: 9/12/2001     Smokeless tobacco: Never Used     Alcohol use No     Drug use: No     Sexual activity: No     Other Topics Concern     Blood Transfusions No     Caffeine Concern No     Occupational Exposure No     Hobby Hazards No     Sleep Concern No     Stress Concern No     Weight Concern No     Special Diet No     Back Care Yes     LBP- sciatica      Exercise Yes     walking      Seat Belt Yes     Social History Narrative          Allergies:   Review of patient's allergies indicates no known allergies.         Review of Systems:  From intake questionnaire     Negative 14 system review except as noted on HPI, nurse's note.         Physical Exam:     Patient is a 71 year old  male   Vitals: Blood pressure 110/60, pulse 60, height 1.664 m (5' 5.5\"), weight 54.4 kg (120 lb).  General Appearance Adult: Body mass index is 19.67 kg/(m^2).,   Alert, no acute distress, oriented  HENT: throat/mouth:normal, good dentition  Neck: No adenopathy,masses or thyromegaly  Lungs: no respiratory distress, or pursed lip breathing  Heart: No obvious jugular venous distension present  Abdomen: soft, nontender, no organomegaly or masses,   Musculoskeltal: extremities normal, no peripheral edema  Skin: no suspicious lesions or rashes  Neuro: Alert, oriented, speech and mentation normal  Psych: affect and mood normal  Gait: Normal  : penis, scrotum, testes normal  JUAN anodular, symmetric - 40 cc    PVR = 190 ml      Labs and Pathology:    I reviewed all applicable laboratory and pathology data and went over findings with patient  Significant for   Lab Results   Component Value Date    CR 1.17 08/01/2017    CR 1.02 07/05/2016    CR 1.12 08/04/2015    CR 1.17 11/01/2014    CR 1.31 09/04/2013     PSA   Date Value Ref Range Status   08/01/2017 5.14 (H) 0 - 4 ug/L Final     Comment:     Assay Method:  Chemiluminescence using Siemens Vista analyzer   11/01/2014 " 3.74 0 - 4 ug/L Final   04/02/2013 2.53 0 - 4 ug/L Final   11/29/2011 2.16 0 - 4 ug/L Final       Outside and Past Medical records:    I spent 10 minutes reviewing outside and past medical records.         Assessment and Plan:     Assessment: 71 year old male with BPH and LUTS as well as elevated PSA. Discussed natural history of BPH and treatment options. Patient would like trial of tamsulosin. Discussed risks/benefits/side effects of medication.    We subsequently had a long discussion about the utility of PSA screening.  We talked about the Pros and the Cons.  We discussed the findings of the PLCO and EORTC studies.  We discussed the results of the Bill-Axelton Scandinavian trial of treatment vs. Observation in clinically detected prostate cancer as well as the results of the PIVOT trial in the context of screen-detected cancer.  We discussed the recommendations by the AUA, ASC and USPTSF. We also discussed the significant (2-6%) and rising risk of biopsy associated sepsis.    We plugged his numbers into the PCPT individualized risk calculator and found   the risk of a negative biopsy is 69%  The risk of low risk cancer is 21%  The risk of intermediate to high risk is 10%    In the end given the overall relatively low PSA and normal JUAN, we felt that there was not strong evidence that a biopsy would be beneficial at this point, understanding that there is a small chance that a biologically important cancer may be missed.    He would like to try tamsulosin and recheck PSA at his next follow-up.    Plan:  Tamsulosin 0.4mg daily  Follow-up in 3 months for symptom review, PVR, and labs    Orders  Orders Placed This Encounter   Procedures     MEASURE POST-VOID RESIDUAL URINE/BLADDER CAPACITY, US NON-IMAGING (77101)     UA without Microscopic     PSA total and free [[id number]]     Wilman Chacko MD  Urology  [facility]  Pager [phone]      Again, thank you for allowing me to participate in the  care of your patient.      Sincerely,    Wilman Chacko MD

## 2017-09-12 NOTE — PROGRESS NOTES
Chief Complaint:   Elevated PSA         Consult or Referral:     Mr. Josh Gonzalez is a 71 year old male seen in consultation from Dr. Sun.         History of Present Illness:   Josh Gonzalez is a very pleasant 71 year old male who presents with a history of Elevated PSA (Prostate Specific Antigen).  Patient has slowly progressive lower urinary tract symptoms. Notes weak stream, incomplete emptying, urgency frequency with occasional urge incontinence. Nocturia x 3-4. No hematuria. No dysuria. No previous UTIs. No urologic history. Noted to have elevated PSA at time of last physical.         Past Medical History:     Past Medical History:   Diagnosis Date     Depression      Mixed incontinence 12/20/2016     Myalgia      KULWANT on CPAP      Postnasal drip      ROTATOR CUFF (CAPSULE) SPRAIN           Past Surgical History:   History reviewed. No pertinent surgical history.         Medications     Current Outpatient Prescriptions   Medication     naproxen (NAPROSYN) 500 MG tablet     cholecalciferol (VITAMIN D) 1000 UNIT tablet     acetaminophen (TYLENOL) 325 MG tablet     sertraline (ZOLOFT) 100 MG tablet     ORDER FOR DME     No current facility-administered medications for this visit.           Family History:     Family History   Problem Relation Age of Onset     Unknown/Adopted Mother      Unknown/Adopted Father    No history of  malignancy  No family history prostate cancer         Social History:     Social History     Social History     Marital status:      Spouse name: N/A     Number of children: 4     Years of education: N/A     Occupational History      Unemployed     Social History Main Topics     Smoking status: Former Smoker     Types: Cigarettes     Quit date: 9/12/2001     Smokeless tobacco: Never Used     Alcohol use No     Drug use: No     Sexual activity: No     Other Topics Concern     Blood Transfusions No     Caffeine Concern No     Occupational Exposure No     Hobby Hazards No      "Sleep Concern No     Stress Concern No     Weight Concern No     Special Diet No     Back Care Yes     LBP- sciatica      Exercise Yes     walking      Seat Belt Yes     Social History Narrative          Allergies:   Review of patient's allergies indicates no known allergies.         Review of Systems:  From intake questionnaire     Negative 14 system review except as noted on HPI, nurse's note.         Physical Exam:     Patient is a 71 year old  male   Vitals: Blood pressure 110/60, pulse 60, height 1.664 m (5' 5.5\"), weight 54.4 kg (120 lb).  General Appearance Adult: Body mass index is 19.67 kg/(m^2).,   Alert, no acute distress, oriented  HENT: throat/mouth:normal, good dentition  Neck: No adenopathy,masses or thyromegaly  Lungs: no respiratory distress, or pursed lip breathing  Heart: No obvious jugular venous distension present  Abdomen: soft, nontender, no organomegaly or masses,   Musculoskeltal: extremities normal, no peripheral edema  Skin: no suspicious lesions or rashes  Neuro: Alert, oriented, speech and mentation normal  Psych: affect and mood normal  Gait: Normal  : penis, scrotum, testes normal  JUAN anodular, symmetric - 40 cc    PVR = 190 ml      Labs and Pathology:    I reviewed all applicable laboratory and pathology data and went over findings with patient  Significant for   Lab Results   Component Value Date    CR 1.17 08/01/2017    CR 1.02 07/05/2016    CR 1.12 08/04/2015    CR 1.17 11/01/2014    CR 1.31 09/04/2013     PSA   Date Value Ref Range Status   08/01/2017 5.14 (H) 0 - 4 ug/L Final     Comment:     Assay Method:  Chemiluminescence using Siemens Vista analyzer   11/01/2014 3.74 0 - 4 ug/L Final   04/02/2013 2.53 0 - 4 ug/L Final   11/29/2011 2.16 0 - 4 ug/L Final       Outside and Past Medical records:    I spent 10 minutes reviewing outside and past medical records.         Assessment and Plan:     Assessment: 71 year old male with BPH and LUTS as well as elevated PSA. Discussed " natural history of BPH and treatment options. Patient would like trial of tamsulosin. Discussed risks/benefits/side effects of medication.    We subsequently had a long discussion about the utility of PSA screening.  We talked about the Pros and the Cons.  We discussed the findings of the PLCO and EORTC studies.  We discussed the results of the [employer] [place] trial of treatment vs. Observation in clinically detected prostate cancer as well as the results of the PIVOT trial in the context of screen-detected cancer.  We discussed the recommendations by the AUA, ASC and USPTSF. We also discussed the significant (2-6%) and rising risk of biopsy associated sepsis.    We plugged his numbers into the PCPT individualized risk calculator and found   the risk of a negative biopsy is 69%  The risk of low risk cancer is 21%  The risk of intermediate to high risk is 10%    In the end given the overall relatively low PSA and normal JUAN, we felt that there was not strong evidence that a biopsy would be beneficial at this point, understanding that there is a small chance that a biologically important cancer may be missed.    He would like to try tamsulosin and recheck PSA at his next follow-up.    Plan:  Tamsulosin 0.4mg daily  Follow-up in 3 months for symptom review, PVR, and labs    Orders  Orders Placed This Encounter   Procedures     MEASURE POST-VOID RESIDUAL URINE/BLADDER CAPACITY, US NON-IMAGING (98493)     UA without Microscopic     PSA total and free [[id number]]     Wilman Chacko MD  Urology  [facility]  Pager [phone]

## 2017-09-12 NOTE — MR AVS SNAPSHOT
After Visit Summary   9/12/2017    Josh Gonzalez    MRN: [id number]           Patient Information     Date Of Birth          1945        Visit Information        Provider Department      9/12/2017 12:45 PM Wilman Chacko MD; [employer] [facility]        Today's Diagnoses     Elevated prostate specific antigen (PSA)    -  1    Urinary frequency           Follow-ups after your visit        Follow-up notes from your care team     Return in about 3 months (around 12/12/2017).      Your next 10 appointments already scheduled     Dec 21, 2017  1:00 PM CST   Return Visit with Wilman Chacko MD   [facility] ([facility])    [address]  [address]  [facility] [address] [phone]              Future tests that were ordered for you today     Open Future Orders        Priority Expected Expires Ordered    PSA total and free [[id number]] Routine 12/12/2017 9/12/2018 9/12/2017            Who to contact     If you have questions or need follow up information about today's clinic visit or your schedule please contact [facility] directly at [phone].  Normal or non-critical lab and imaging results will be communicated to you by MyChart, letter or phone within 4 business days after the clinic has received the results. If you do not hear from us within 7 days, please contact the clinic through [employer] or phone. If you have a critical or abnormal lab result, we will notify you by phone as soon as possible.  Submit refill requests through Handle or call your pharmacy and they will forward the refill request to us. Please allow 3 business days for your refill to be completed.          Additional Information About Your Visit        [employer] Information     Handle lets you send messages to your doctor,  "view your test results, renew your prescriptions, schedule appointments and more. To sign up, go to [email or web address] . Click on \"Log in\" on the left side of the screen, which will take you to the Welcome page. Then click on \"Sign up Now\" on the right side of the page.     You will be asked to enter the access code listed below, as well as some personal information. Please follow the directions to create your username and password.     Your access code is: [email or web address]  Expires: 10/30/2017 10:49 AM     Your access code will  in 90 days. If you need help or a new code, please call your [place] clinic or [phone].        Care EveryWhere ID     This is your Care EveryWhere ID. This could be used by other organizations to access your [place] medical records  [id number]        Your Vitals Were     Pulse Height BMI (Body Mass Index)             60 1.664 m (5' 5.5\") 19.67 kg/m2          Blood Pressure from Last 3 Encounters:   17 110/60   17 144/78   17 138/76    Weight from Last 3 Encounters:   17 54.4 kg (120 lb)   17 56.7 kg (125 lb)   17 59.7 kg (131 lb 9.6 oz)              We Performed the Following     MEASURE POST-VOID RESIDUAL URINE/BLADDER CAPACITY, US NON-IMAGING (61851)     UA without Microscopic          Today's Medication Changes          These changes are accurate as of: 17  1:56 PM.  If you have any questions, ask your nurse or doctor.               Start taking these medicines.        Dose/Directions    tamsulosin 0.4 MG capsule   Commonly known as:  FLOMAX   Used for:  Elevated prostate specific antigen (PSA)   Started by:  Wilman Chacko MD        Dose:  0.4 mg   Take 1 capsule (0.4 mg) by mouth daily   Quantity:  30 capsule   Refills:  3            Where to get your medicines      These medications were sent to [id number] [place], [place] - [address]  [address] [place] [address]. [address], [facility] [address]     Phone:  " [phone]     tamsulosin 0.4 MG capsule                Primary Care Provider Office Phone # Fax #    Ray Sun [phone]       [address] [facility] [address]        Equal Access to Services     STANISLAWASHLEY JUANITO : Hadii aad ku hadashlio Soomaali, waaxda luqadaha, qaybta kaalmada adeegyada, waxay idiin haytosinn adeda khradha lamacarenasrikanth kumar. So [facility] [phone].    ATENCIÓN: Si habla español, tiene a lilly disposición servicios gratuitos de asistencia lingüística. Llame al [phone].    We comply with applicable federal civil rights laws and [place] laws. We do not discriminate on the basis of race, color, national origin, age, disability sex, sexual orientation or gender identity.            Thank you!     Thank you for choosing [facility] [place]  for your care. Our goal is always to provide you with excellent care. Hearing back from our patients is one way we can continue to improve our services. Please take a few minutes to complete the written survey that you may receive in the mail after your visit with us. Thank you!             Your Updated Medication List - Protect others around you: Learn how to safely use, store and throw away your medicines at www.disposemymeds.org.          This list is accurate as of: 9/12/17  1:56 PM.  Always use your most recent med list.                   Brand Name Dispense Instructions for use Diagnosis    acetaminophen 325 MG tablet    TYLENOL    100 tablet    Take 2 tablets (650 mg) by mouth every 6 hours as needed for mild pain    Primary osteoarthritis of both knees, Chronic pain of both shoulders       cholecalciferol 1000 UNIT tablet    vitamin D    100 tablet    Take 1 tablet (1,000 Units) by mouth daily    Primary osteoarthritis of both knees, Chronic pain of both shoulders       naproxen 500 MG tablet    NAPROSYN    30 tablet    Take 1 tablet (500 mg) by mouth 2 times daily as needed for moderate pain     Primary osteoarthritis of both knees, Chronic pain of both shoulders       order for DME     1 Device    Equipment being ordered: CPAP supplies (mask, tubing etc.)    Sleep apnea       sertraline 100 MG tablet    ZOLOFT    90 tablet    Take 1 tablet (100 mg) by mouth daily    Recurrent major depressive disorder, in partial remission (H)       tamsulosin 0.4 MG capsule    FLOMAX    30 capsule    Take 1 capsule (0.4 mg) by mouth daily    Elevated prostate specific antigen (PSA)

## 2017-09-12 NOTE — NURSING NOTE
Her for elevated PSA . Here with wife and interpretor. Bermudez sc/o urinary frequency for years. Monika Her LPN

## 2017-09-19 ENCOUNTER — TELEPHONE (OUTPATIENT)
Dept: INTERNAL MEDICINE | Facility: CLINIC | Age: 72
End: 2017-09-19

## 2017-09-19 NOTE — TELEPHONE ENCOUNTER
Fax received from [facility] for review and signature.  Put in Dr. Sun's in basket.

## 2017-09-25 ENCOUNTER — TELEPHONE (OUTPATIENT)
Dept: INTERNAL MEDICINE | Facility: CLINIC | Age: 72
End: 2017-09-25

## 2017-09-25 NOTE — PROGRESS NOTES
[facility] received order back from PCP but it was not completed, only signed.  Also [facility] has just decided that they will no longer supply nutrition supplements.  Referral made to Steven.    CM received voice mail message from [employer]  asking about status of Ensure.  Called client and spoke with him with Cantonese  and informed him of above.  Suggested that he may want to see PCP so order can be placed for the Ensure.  Also left message for Sharee informing her of this.  VIRGINIA Durbin, [facility]   Tel [phone]  Fax [phone]

## 2017-09-25 NOTE — PROGRESS NOTES
Notified by [facility] they will no longer be supplying nutritional supplements.  Per CM, placed order with Steven, supplied [employer] with the incomplete forms sent to [facility] from PCP  Elenita Lowery  Case Management Specialist  [facility]  [phone]

## 2017-09-25 NOTE — TELEPHONE ENCOUNTER
Kyrie, interpretor for pt calls. States pt is trying to get nutritional supplement for weight loss and told by case management that pt may need to be seen for this.     Informed Kyrie that per Care Coordinator note from this morning, Case Management was informed that [facility] cannot provide this and they were sending this to [facility] instead. Kyrie will allow time for [employer] to process this request.

## 2017-09-27 ENCOUNTER — MEDICAL CORRESPONDENCE (OUTPATIENT)
Dept: HEALTH INFORMATION MANAGEMENT | Facility: CLINIC | Age: 72
End: 2017-09-27

## 2017-09-27 ENCOUNTER — TELEPHONE (OUTPATIENT)
Dept: INTERNAL MEDICINE | Facility: CLINIC | Age: 72
End: 2017-09-27

## 2017-10-02 ENCOUNTER — TELEPHONE (OUTPATIENT)
Dept: INTERNAL MEDICINE | Facility: CLINIC | Age: 72
End: 2017-10-02

## 2017-10-02 NOTE — TELEPHONE ENCOUNTER
Panel Management Review      Patient has the following on his problem list:     Depression / Dysthymia review    Measure:  Needs PHQ-9 score of 4 or less during index window.  Administer PHQ-9 and if score is 5 or more, send encounter to provider for next steps.    5 - 7 month window range: yes    PHQ-9 SCORE 7/6/2016 4/25/2017 8/1/2017   Total Score - - -   Total Score 15 11 19       If PHQ-9 recheck is 5 or more, route to provider for next steps.    Patient is due for:  PHQ9    Diabetes    ASA: Not Required     Last A1C  Lab Results   Component Value Date    A1C 5.8 08/01/2017    A1C 6.0 07/05/2016    A1C 6.3 11/17/2015     A1C tested: Passed    Last LDL:    Lab Results   Component Value Date    CHOL 218 08/01/2017     Lab Results   Component Value Date    HDL 58 08/01/2017     Lab Results   Component Value Date     08/01/2017     Lab Results   Component Value Date    TRIG 176 08/01/2017     Lab Results   Component Value Date    CHOLHDLRATIO 3.6 11/01/2014     Lab Results   Component Value Date    NHDL 160 08/01/2017       Is the patient on a Statin? NO             Is the patient on Aspirin? NO        Last three blood pressure readings:  BP Readings from Last 3 Encounters:   09/12/17 110/60   08/01/17 144/78   04/25/17 138/76       Date of last diabetes office visit: 08/01/2017     Tobacco History:     History   Smoking Status     Former Smoker     Types: Cigarettes     Quit date: 9/12/2001   Smokeless Tobacco     Never Used               Composite cancer screening  Chart review shows that this patient is due/due soon for the following None  Summary:    Patient is due/failing the following:   PHQ9    Action needed:   Patient needs to do PHQ9.    Type of outreach:    Phone, left message for patient to call back. LM for  to call back    Questions for provider review:    None                                                                                                                                     Jen Carvajal MA       Chart routed to none .

## 2017-10-04 NOTE — PROGRESS NOTES
Per Steven, Ensure was shipped on 9/28/17.  CC notified.  Elenita Lowery  Case Management Specialist  [facility]  [phone]

## 2017-10-19 ENCOUNTER — CARE COORDINATION (OUTPATIENT)
Dept: GERIATRIC MEDICINE | Facility: CLINIC | Age: 72
End: 2017-10-19

## 2017-10-19 NOTE — PROGRESS NOTES
Received call from Divina [phone] at client's PCA agency stating that she was out to see client and he has an abundance of male guards (incontience product).  Emailed [facility] and asked them to hold order for 3 months.  VIRGINIA Durbin, [facility]   Tel [phone]  Fax [phone]

## 2017-10-20 ENCOUNTER — CARE COORDINATION (OUTPATIENT)
Dept: GERIATRIC MEDICINE | Facility: CLINIC | Age: 72
End: 2017-10-20

## 2017-10-20 NOTE — PROGRESS NOTES
Rec'd vm from VIRGINIA Murcia at [facility] stating member has an abundance of pullups at this time and requested the order be placed on hold.  CMS notified Gregorio at [facility].  Elenita Lowery  Case Management Specialist  [facility]  [phone]

## 2017-10-25 ENCOUNTER — CARE COORDINATION (OUTPATIENT)
Dept: GERIATRIC MEDICINE | Facility: CLINIC | Age: 72
End: 2017-10-25

## 2017-10-25 NOTE — PROGRESS NOTES
Called client and spoke with wife Lissette.  Also spoke with Sharee Cantonese .  Scheduled visit for Thurs, Nov 2 at 3 pm.  KTT informed.  VIRGINIA Durbin, [facility]   Tel [phone]  Fax [phone]

## 2017-11-02 ENCOUNTER — CARE COORDINATION (OUTPATIENT)
Dept: GERIATRIC MEDICINE | Facility: CLINIC | Age: 72
End: 2017-11-02

## 2017-11-03 NOTE — PROGRESS NOTES
[facility] Home Visit Assessment     Home visit for Health Risk Assessment with Josh Gonzalez completed on November 2, 2017  Member resides in Private home with stairs and lives with spouse and dtr and her family.  Present at assessment: This , client, wife-Lissette Baird, and -Mahesh Lutz.    Current Care Plan  Member currently receiving the following services: PCA, ADC, Ensure, and incontinence products: male guards.      Medication Review  Medication reconciliation completed in Epic:Yes  Medication set-up & administration: Independent-does not set up.  Family caregiver gives some reminders  Medication understanding/adherence (by member and family): Medications adherence concerns:  No     Mental/Behavioral Health   Depression Screening: See PHQ assessment flowsheet.   Mental Health Diagnosis: Yes: Depression If yes, how managed?  Medication  No current MH services.      Falls in last 12 months: Yes: one fall in house If yes, was an injury sustained? No    ADL/IADL Dependencies: Ambulation-cane, Bathing, Dressing, Grooming, Transfers, Cleaning, Cooking, Laundry, Shopping, Meal prep, Money Management and Transportation     [facility] Health Plan sponsored benefits: Shared information re: Silver Sneakers/gym memberships, ASA, Calcium +D.    PCA Assessment completed at visit: Yes   If yes, will process assessment and communicate results to member within 10 days.  Elderly Waiver Eligibility: Yes-will continue on EW    Care Plan & Recommendations: Continue current services.  PCA assessment completed and PCA will increase to 3 hrs/day.    See CC for detailed assessment information.    Follow-Up Plan: Member informed of future contact, plan to f/u with member with a 6 month telephone assessment.  Contact information shared with member and family, encouraged member to call with any questions or concerns at any time.  [place] care continuum providers: Please refer to Health Care Home on the [facility] Problem  List to view this patient's [facility] Care Plan Summary.    VIRGINIA Durbin, [facility]   Tel [phone]  Fax [phone]

## 2017-11-03 NOTE — PROGRESS NOTES
Addendum  Called PCA agency, [facility], today (11/3) and let them know of increase in PCA hours to 3 hrs/day.  Told them they would receive a copy of PCA assessment next week and copy will be mailed to client.  Start date of increased hours is 11/2.  VIRGINIA Durbin, [facility]   Tel [phone]  Fax [phone]

## 2017-11-07 ENCOUNTER — CARE COORDINATION (OUTPATIENT)
Dept: GERIATRIC MEDICINE | Facility: CLINIC | Age: 72
End: 2017-11-07

## 2017-11-07 NOTE — PROGRESS NOTES
Received after visit chart from care coordinator.  Completed following tasks: Mailed copy of care plan to client  Updated services in access  Submitted referrals/auths for ADC  Entered MMIS  Chart was returned to CC.   [employer]:  Faxed completed PCA assessment to PCA Agency and mailed copies to member.  Faxed MD Communication to PCP.  Emailed referral form for auth to [employer].    Elenita Lowery  Case Management Specialist  [facility]  [phone]

## 2017-12-18 DIAGNOSIS — R97.20 ELEVATED PROSTATE SPECIFIC ANTIGEN (PSA): ICD-10-CM

## 2017-12-18 PROCEDURE — 84154 ASSAY OF PSA FREE: CPT | Mod: 90 | Performed by: UROLOGY

## 2017-12-18 PROCEDURE — 99000 SPECIMEN HANDLING OFFICE-LAB: CPT | Performed by: UROLOGY

## 2017-12-18 PROCEDURE — 36415 COLL VENOUS BLD VENIPUNCTURE: CPT | Performed by: UROLOGY

## 2017-12-18 PROCEDURE — 84153 ASSAY OF PSA TOTAL: CPT | Mod: 90 | Performed by: UROLOGY

## 2017-12-20 LAB
PSA FREE MFR SERPL: 21 %
PSA FREE SERPL-MCNC: 0.8 NG/ML
PSA SERPL-MCNC: 3.8 NG/ML (ref 0–4)

## 2017-12-27 DIAGNOSIS — R97.20 ELEVATED PROSTATE SPECIFIC ANTIGEN (PSA): ICD-10-CM

## 2018-01-03 RX ORDER — TAMSULOSIN HYDROCHLORIDE 0.4 MG/1
0.4 CAPSULE ORAL DAILY
Qty: 30 CAPSULE | Refills: 3 | Status: SHIPPED | OUTPATIENT
Start: 2018-01-03 | End: 2018-01-18

## 2018-01-03 NOTE — TELEPHONE ENCOUNTER
Requested Prescriptions   Pending Prescriptions Disp Refills     tamsulosin (FLOMAX) 0.4 MG capsule 30 capsule 3     Sig: Take 1 capsule (0.4 mg) by mouth daily    Alpha Blockers Passed    12/27/2017  9:45 AM       Passed - BP is less than 140/90    BP Readings from Last 3 Encounters:   09/12/17 110/60   08/01/17 144/78   04/25/17 138/76          Passed - Recent or future visit with authorizing provider's specialty    Patient had office visit in the last year or has a visit in the next 30 days with authorizing provider.  See chart review.   Last OV: 08/01/17 - due for appt in April       Passed - Patient does not have Tadalafil, Vardenafil, or Sildenafil on their medication list       Passed - Patient is 18 years of age or older        Routing refill request to provider for review/approval because:  08/01/17 PB out of SO parameters.    Please advise, thanks.

## 2018-01-10 ENCOUNTER — OFFICE VISIT (OUTPATIENT)
Dept: INTERNAL MEDICINE | Facility: CLINIC | Age: 73
End: 2018-01-10
Payer: COMMERCIAL

## 2018-01-10 VITALS
BODY MASS INDEX: 20.25 KG/M2 | WEIGHT: 126 LBS | SYSTOLIC BLOOD PRESSURE: 126 MMHG | DIASTOLIC BLOOD PRESSURE: 62 MMHG | HEART RATE: 91 BPM | TEMPERATURE: 98.1 F | OXYGEN SATURATION: 98 % | HEIGHT: 66 IN

## 2018-01-10 DIAGNOSIS — J20.9 ACUTE BRONCHITIS, UNSPECIFIED ORGANISM: Primary | ICD-10-CM

## 2018-01-10 DIAGNOSIS — Z23 NEED FOR PROPHYLACTIC VACCINATION AND INOCULATION AGAINST INFLUENZA: ICD-10-CM

## 2018-01-10 DIAGNOSIS — E11.9 TYPE 2 DIABETES MELLITUS WITHOUT COMPLICATION, WITHOUT LONG-TERM CURRENT USE OF INSULIN (H): ICD-10-CM

## 2018-01-10 PROCEDURE — 99214 OFFICE O/P EST MOD 30 MIN: CPT | Mod: 25 | Performed by: PHYSICIAN ASSISTANT

## 2018-01-10 PROCEDURE — 90471 IMMUNIZATION ADMIN: CPT | Performed by: PHYSICIAN ASSISTANT

## 2018-01-10 PROCEDURE — 90662 IIV NO PRSV INCREASED AG IM: CPT | Performed by: PHYSICIAN ASSISTANT

## 2018-01-10 RX ORDER — GUAIFENESIN 200 MG/1
400 TABLET ORAL EVERY 4 HOURS PRN
Qty: 30 TABLET | Refills: 0 | Status: SHIPPED | OUTPATIENT
Start: 2018-01-10 | End: 2018-10-24

## 2018-01-10 ASSESSMENT — ENCOUNTER SYMPTOMS
FEVER: 0
DIARRHEA: 0
VOMITING: 0
HEADACHES: 0
FOCAL WEAKNESS: 0
NAUSEA: 0
COUGH: 1
CHILLS: 0
ABDOMINAL PAIN: 0
SHORTNESS OF BREATH: 0

## 2018-01-10 NOTE — MR AVS SNAPSHOT
After Visit Summary   1/10/2018    Josh Gonzalez    MRN: [id number]           Patient Information     Date Of Birth          1945        Visit Information        Provider Department      1/10/2018 8:40 AM Georgia Verma PA-C; [facility]        Today's Diagnoses     Acute bronchitis, unspecified organism    -  1    Type 2 diabetes mellitus without complication, without long-term current use of insulin (H)        Need for prophylactic vaccination and inoculation against influenza           Follow-ups after your visit        Follow-up notes from your care team     Return if symptoms worsen or fail to improve.      Your next 10 appointments already scheduled     Jan 18, 2018  2:30 PM CST   Return Visit with Wilman Chacko MD   [facility] [place] ([facility])    [address]  [address]  [facility] [address] [phone]              Who to contact     If you have questions or need follow up information about today's clinic visit or your schedule please contact [facility] directly at [phone].  Normal or non-critical lab and imaging results will be communicated to you by [employer], letter or phone within 4 business days after the clinic has received the results. If you do not hear from us within 7 days, please contact the clinic through [employer] or phone. If you have a critical or abnormal lab result, we will notify you by phone as soon as possible.  Submit refill requests through [employer] or call your pharmacy and they will forward the refill request to us. Please allow 3 business days for your refill to be completed.          Additional Information About Your Visit        MyChart Information     [employer] lets you send messages to your doctor, view your test results, renew your prescriptions, schedule appointments and more. To sign up, go to  [email or web address] . Click on \"Log in\" on the left side of the screen, which will take you to the Welcome page. Then click on \"Sign up Now\" on the right side of the page.     You will be asked to enter the access code listed below, as well as some personal information. Please follow the directions to create your username and password.     Your access code is: [email or web address]  Expires: 4/10/2018 10:48 AM     Your access code will  in 90 days. If you need help or a new code, please call your [place] clinic or [phone].        Care EveryWhere ID     This is your Care EveryWhere ID. This could be used by other organizations to access your [place] medical records  [id number]        Your Vitals Were     Pulse Temperature Height Pulse Oximetry BMI (Body Mass Index)       91 98.1  F (36.7  C) (Oral) 5' 5.5\" (1.664 m) 98% 20.65 kg/m2        Blood Pressure from Last 3 Encounters:   01/10/18 126/62   17 110/60   17 144/78    Weight from Last 3 Encounters:   01/10/18 126 lb (57.2 kg)   17 120 lb (54.4 kg)   17 125 lb (56.7 kg)              We Performed the Following     FLU VACCINE, INCREASED ANTIGEN, PRESV FREE, AGE 65+ [49006]     Vaccine Administration, Initial [65638]          Today's Medication Changes          These changes are accurate as of: 1/10/18 10:48 AM.  If you have any questions, ask your nurse or doctor.               Start taking these medicines.        Dose/Directions    guaiFENesin 200 MG Tabs tablet   Commonly known as:  ORGANIDIN   Used for:  Acute bronchitis, unspecified organism   Started by:  Georgia Verma PA-C        Dose:  400 mg   Take 2 tablets (400 mg) by mouth every 4 hours as needed for cough   Quantity:  30 tablet   Refills:  0            Where to get your medicines      These medications were sent to [id number] [place], [place] - [address]  [address], [facility] [address]     Phone:  [phone]     Lakia" 200 MG Tabs tablet                Primary Care Provider Office Phone # Fax #    Ray Sun [phone]       [address] [facility] [address]        Equal Access to Services     AMARILYS SOLOMON : Dar marcus grimaldo josefinao Soadonayali, waaxda luqadaha, qaybta kaalmada adeegyada, barry wolfn felix leo laIsabelcarlos kumar. So [facility] [phone].    ATENCIÓN: Si habla español, tiene a lilly disposición servicios gratuitos de asistencia lingüística. Llame al [phone].    We comply with applicable federal civil rights laws and [place] laws. We do not discriminate on the basis of race, color, national origin, age, disability, sex, sexual orientation, or gender identity.            Thank you!     Thank you for choosing [facility]  for your care. Our goal is always to provide you with excellent care. Hearing back from our patients is one way we can continue to improve our services. Please take a few minutes to complete the written survey that you may receive in the mail after your visit with us. Thank you!             Your Updated Medication List - Protect others around you: Learn how to safely use, store and throw away your medicines at www.disposemymeds.org.          This list is accurate as of: 1/10/18 10:48 AM.  Always use your most recent med list.                   Brand Name Dispense Instructions for use Diagnosis    acetaminophen 325 MG tablet    TYLENOL    100 tablet    Take 2 tablets (650 mg) by mouth every 6 hours as needed for mild pain    Primary osteoarthritis of both knees, Chronic pain of both shoulders       cholecalciferol 1000 UNIT tablet    vitamin D3    100 tablet    Take 1 tablet (1,000 Units) by mouth daily    Primary osteoarthritis of both knees, Chronic pain of both shoulders       guaiFENesin 200 MG Tabs tablet    ORGANIDIN    30 tablet    Take 2 tablets (400 mg) by mouth every 4 hours as needed for cough    Acute bronchitis, unspecified organism        naproxen 500 MG tablet    NAPROSYN    30 tablet    Take 1 tablet (500 mg) by mouth 2 times daily as needed for moderate pain    Primary osteoarthritis of both knees, Chronic pain of both shoulders       order for DME     1 Device    Equipment being ordered: CPAP supplies (mask, tubing etc.)    Sleep apnea       sertraline 100 MG tablet    ZOLOFT    90 tablet    Take 1 tablet (100 mg) by mouth daily    Recurrent major depressive disorder, in partial remission (H)       tamsulosin 0.4 MG capsule    FLOMAX    30 capsule    Take 1 capsule (0.4 mg) by mouth daily    Elevated prostate specific antigen (PSA)

## 2018-01-10 NOTE — PROGRESS NOTES
".  SUBJECTIVE:   Man Yamilet Gonzalez is a 72 year old male who presents to clinic today for the following health issues:      {Superlists:779256}    {additional problems for provider to add:938865}    Problem list and histories reviewed & adjusted, as indicated.  Additional history: {NONE - AS DOCUMENTED:055128::\"as documented\"}    {HIST REVIEW/ LINKS 2:563756}    Reviewed and updated as needed this visit by clinical staff  Tobacco  Allergies  Meds  Med Hx  Surg Hx  Fam Hx  Soc Hx      Reviewed and updated as needed this visit by Provider         {PROVIDER CHARTING PREFERENCE:809619}    "

## 2018-01-10 NOTE — NURSING NOTE
"Chief Complaint   Patient presents with     Cough     pt c/o a prod cough with thick yellow phlegm onset Monday.       Initial /62 (BP Location: Right arm, Patient Position: Sitting, Cuff Size: Adult Regular)  Pulse 91  Temp 98.1  F (36.7  C) (Oral)  Ht 5' 5.5\" (1.664 m)  Wt 126 lb (57.2 kg)  SpO2 98%  BMI 20.65 kg/m2 Estimated body mass index is 20.65 kg/(m^2) as calculated from the following:    Height as of this encounter: 5' 5.5\" (1.664 m).    Weight as of this encounter: 126 lb (57.2 kg).  Medication Reconciliation: complete    "

## 2018-01-10 NOTE — NURSING NOTE
"Injectable Influenza Immunization Documentation    1.  Has the patient received the information for the injectable influenza vaccine? YES     2. Is the patient 6 months of age or older? YES     3. Does the patient have any of the following contraindications?         Severe allergy to eggs? No     Severe allergic reaction to previous influenza vaccines? No   Severe allergy to latex? No       History of Guillain-Fairview syndrome? No     Currently have a temperature greater than 100.4F? No        4.  Severely egg allergic patients should have flu vaccine eligibility assessed by an MD, RN, or pharmacist, and those who received flu vaccine should be observed for 15 min by an MD, RN, Pharmacist, Medical Technician, or member of clinic staff.\": YES    5. Latex-allergic patients should be given latex-free influenza vaccine No. Please reference the Vaccine latex table to determine if your clinic s product is latex-containing.       Vaccination given by Evelin Nolasco CMA (AAMA)          "

## 2018-01-10 NOTE — PROGRESS NOTES
HPI    SUBJECTIVE:         Man Yamilet Gonzalez is a 72 year old male with hx DM who complains of moderate productive cough onset 2 days ago. Cough productive of yellow phlegm. Symptoms are constant in duration. No treatments tried. Denies fever/chills, HA, CP, SOB, abd pain, N/V/D, rash, or any other symptoms. Patient denies sick contacts. He is a former smoker; quit 15 years ago. Denies frequent cough, no hx COPD.    A1c 5.8% Aug 2017.    Wants flu shot today if possible.       Chart Review:  PHQ-9 SCORE 7/6/2016 4/25/2017 8/1/2017   Total Score - - -   Total Score 15 11 19     ROQUE-7 SCORE 7/6/2016   Total Score 15       Patient Active Problem List   Diagnosis     Advanced directives, counseling/discussion     Hyperlipidemia LDL goal <160     Health Care Home     Major depression     KULWANT (obstructive sleep apnea)     Gait instability     Mixed incontinence     Type 2 diabetes mellitus without complication, without long-term current use of insulin (H)     Primary osteoarthritis of both knees     History reviewed. No pertinent surgical history.  Family History   Problem Relation Age of Onset     Unknown/Adopted Mother      Unknown/Adopted Father       Social History   Substance Use Topics     Smoking status: Former Smoker     Types: Cigarettes     Quit date: 9/12/2001     Smokeless tobacco: Never Used     Alcohol use No        Problem list, Medication list, Allergies, Medical/Social/Surg hx reviewed in [employer], updated as appropriate.      Review of Systems   Constitutional: Negative for chills and fever.   Respiratory: Positive for cough. Negative for shortness of breath.    Cardiovascular: Negative for chest pain.   Gastrointestinal: Negative for abdominal pain, diarrhea, nausea and vomiting.   Skin: Negative for rash.   Neurological: Negative for focal weakness and headaches.   All other systems reviewed and are negative.        Physical Exam   Constitutional: He is oriented to person, place, and time and well-developed,  "well-nourished, and in no distress.   HENT:   Head: Normocephalic and atraumatic.   Right Ear: Tympanic membrane, external ear and ear canal normal.   Left Ear: Tympanic membrane, external ear and ear canal normal.   Mouth/Throat: Uvula is midline, oropharynx is clear and moist and mucous membranes are normal.   Cardiovascular: Normal rate, regular rhythm and normal heart sounds.    Pulmonary/Chest: Effort normal and breath sounds normal.   Musculoskeletal: Normal range of motion.   Neurological: He is alert and oriented to person, place, and time. Gait normal.   Skin: Skin is warm and dry.   Nursing note and vitals reviewed.    Vital Signs  /62 (BP Location: Right arm, Patient Position: Sitting, Cuff Size: Adult Regular)  Pulse 91  Temp 98.1  F (36.7  C) (Oral)  Ht 5' 5.5\" (1.664 m)  Wt 126 lb (57.2 kg)  SpO2 98%  BMI 20.65 kg/m2   Body mass index is 20.65 kg/(m^2).    Diagnostic Test Results:  none     ASSESSMENT/PLAN:                                                        ICD-10-CM    1. Acute bronchitis, unspecified organism J20.9 guaiFENesin (ORGANIDIN) 200 MG TABS tablet   2. Type 2 diabetes mellitus without complication, without long-term current use of insulin (H) E11.9    3. Need for prophylactic vaccination and inoculation against influenza Z23 FLU VACCINE, INCREASED ANTIGEN, PRESV FREE, AGE 65+ [62019]     Vaccine Administration, Initial [89127]     Lungs CTAB, afebrile. O2 sat 98%. Viral etiology suspected, supportive treatments discussed. Pt would like Mucinex prescribed. If not better in 7-10 days, or develop a fever, return for recheck.    Flu shot given today.    DM stable, continue current management plan.    I have discussed any lab or imaging results, the patient's diagnosis, and my plan of treatment with the patient and/or family. Patient is aware to come back in if with worsening symptoms or if no relief despite treatment plan.  Patient voiced understanding and had no further " questions.       Follow Up: Data Unavailable    MARY Bowden, PA-C  [facility]

## 2018-01-11 ENCOUNTER — RADIANT APPOINTMENT (OUTPATIENT)
Dept: GENERAL RADIOLOGY | Facility: CLINIC | Age: 73
End: 2018-01-11
Payer: COMMERCIAL

## 2018-01-11 ENCOUNTER — OFFICE VISIT (OUTPATIENT)
Dept: INTERNAL MEDICINE | Facility: CLINIC | Age: 73
End: 2018-01-11
Payer: COMMERCIAL

## 2018-01-11 ENCOUNTER — TELEPHONE (OUTPATIENT)
Dept: INTERNAL MEDICINE | Facility: CLINIC | Age: 73
End: 2018-01-11

## 2018-01-11 VITALS
HEART RATE: 95 BPM | TEMPERATURE: 97.9 F | BODY MASS INDEX: 20.07 KG/M2 | HEIGHT: 66 IN | WEIGHT: 124.9 LBS | DIASTOLIC BLOOD PRESSURE: 70 MMHG | SYSTOLIC BLOOD PRESSURE: 118 MMHG

## 2018-01-11 DIAGNOSIS — J20.9 ACUTE BRONCHITIS, UNSPECIFIED ORGANISM: Primary | ICD-10-CM

## 2018-01-11 DIAGNOSIS — J11.1 INFLUENZA-LIKE ILLNESS: Primary | ICD-10-CM

## 2018-01-11 DIAGNOSIS — J20.9 ACUTE BRONCHITIS, UNSPECIFIED ORGANISM: ICD-10-CM

## 2018-01-11 PROCEDURE — 71046 X-RAY EXAM CHEST 2 VIEWS: CPT

## 2018-01-11 PROCEDURE — 99213 OFFICE O/P EST LOW 20 MIN: CPT | Performed by: INTERNAL MEDICINE

## 2018-01-11 RX ORDER — ALBUTEROL SULFATE 90 UG/1
2 AEROSOL, METERED RESPIRATORY (INHALATION) EVERY 6 HOURS PRN
Qty: 1 INHALER | Refills: 0 | Status: SHIPPED | OUTPATIENT
Start: 2018-01-11 | End: 2019-11-18

## 2018-01-11 RX ORDER — DOXYCYCLINE HYCLATE 100 MG
100 TABLET ORAL 2 TIMES DAILY
Qty: 20 TABLET | Refills: 0 | Status: SHIPPED | OUTPATIENT
Start: 2018-01-11 | End: 2018-10-24

## 2018-01-11 NOTE — MR AVS SNAPSHOT
After Visit Summary   1/11/2018    Josh Gonzalez    MRN: [id number]           Patient Information     Date Of Birth          1945        Visit Information        Provider Department      1/11/2018 1:15 PM Frankie Garcia MD; PHONE,  [facility]        Today's Diagnoses     Acute bronchitis, unspecified organism    -  1      Care Instructions    Please have your Xray done at 1st floor.    Then, please  antibiotic prescription and inhaler at the pharmacy.  Ask the pharmacist to show you how to use the inhaler          Follow-ups after your visit        Your next 10 appointments already scheduled     Jan 18, 2018  2:30 PM CST   Return Visit with Wilman Chacko MD   [facility] [place] ([facility])    [address]  [address]  [facility] [address] [phone]              Future tests that were ordered for you today     Open Future Orders        Priority Expected Expires Ordered    XR Chest 2 Views Routine 1/11/2018 1/11/2019 1/11/2018            Who to contact     If you have questions or need follow up information about today's clinic visit or your schedule please contact [facility] directly at [phone].  Normal or non-critical lab and imaging results will be communicated to you by MyChart, letter or phone within 4 business days after the clinic has received the results. If you do not hear from us within 7 days, please contact the clinic through MyChart or phone. If you have a critical or abnormal lab result, we will notify you by phone as soon as possible.  Submit refill requests through [employer] or call your pharmacy and they will forward the refill request to us. Please allow 3 business days for your refill to be completed.          Additional Information About Your Visit        [employer] Information     [employer] lets you send messages to your doctor, view your test  "results, renew your prescriptions, schedule appointments and more. To sign up, go to [email or web address] . Click on \"Log in\" on the left side of the screen, which will take you to the Welcome page. Then click on \"Sign up Now\" on the right side of the page.     You will be asked to enter the access code listed below, as well as some personal information. Please follow the directions to create your username and password.     Your access code is: [email or web address]  Expires: 4/10/2018 10:48 AM     Your access code will  in 90 days. If you need help or a new code, please call your [place] clinic or [phone].        Care EveryWhere ID     This is your Care EveryWhere ID. This could be used by other organizations to access your [place] medical records  [id number]        Your Vitals Were     Pulse Temperature Height BMI (Body Mass Index)          95 97.9  F (36.6  C) (Oral) 5' 5.5\" (1.664 m) 20.47 kg/m2         Blood Pressure from Last 3 Encounters:   18 118/70   01/10/18 126/62   17 110/60    Weight from Last 3 Encounters:   18 124 lb 14.4 oz (56.7 kg)   01/10/18 126 lb (57.2 kg)   17 120 lb (54.4 kg)                 Today's Medication Changes          These changes are accurate as of: 18  2:05 PM.  If you have any questions, ask your nurse or doctor.               Start taking these medicines.        Dose/Directions    albuterol 108 (90 BASE) MCG/ACT Inhaler   Commonly known as:  PROAIR HFA/PROVENTIL HFA/VENTOLIN HFA   Used for:  Acute bronchitis, unspecified organism   Started by:  Frankie Garcia MD        Dose:  2 puff   Inhale 2 puffs into the lungs every 6 hours as needed for shortness of breath / dyspnea or wheezing   Quantity:  1 Inhaler   Refills:  0       doxycycline 100 MG tablet   Commonly known as:  VIBRA-TABS   Used for:  Acute bronchitis, unspecified organism   Started by:  Frankie Garcia MD        Dose:  100 mg   Take 1 tablet (100 mg) by mouth 2 times daily "   Quantity:  20 tablet   Refills:  0            Where to get your medicines      These medications were sent to [facility] [place], [place] - [address]  [address] [facility] [address]     Phone:  [phone]     albuterol 108 (90 BASE) MCG/ACT Inhaler    doxycycline 100 MG tablet                Primary Care Provider Office Phone # Fax #    Ray Sun [phone]       [address]  [facility] [address]        Equal Access to Services     [facility]: Hadii aad ku hadasho Soomaali, waaxda luqadaha, qaybta kaalmada adeegyada, waxay lilianain hayaan adeda steve . So [facility] [phone].    ATENCIÓN: Si habla español, tiene a lilly disposición servicios gratuitos de asistencia lingüística. [facility] [phone].    We comply with applicable federal civil rights laws and [place] laws. We do not discriminate on the basis of race, color, national origin, age, disability, sex, sexual orientation, or gender identity.            Thank you!     Thank you for choosing [facility]  for your care. Our goal is always to provide you with excellent care. Hearing back from our patients is one way we can continue to improve our services. Please take a few minutes to complete the written survey that you may receive in the mail after your visit with us. Thank you!             Your Updated Medication List - Protect others around you: Learn how to safely use, store and throw away your medicines at www.disposemymeds.org.          This list is accurate as of: 1/11/18  2:05 PM.  Always use your most recent med list.                   Brand Name Dispense Instructions for use Diagnosis    acetaminophen 325 MG tablet    TYLENOL    100 tablet    Take 2 tablets (650 mg) by mouth every 6 hours as needed for mild pain    Primary osteoarthritis of both knees, Chronic pain of both shoulders       albuterol 108 (90 BASE) MCG/ACT Inhaler    PROAIR  HFA/PROVENTIL HFA/VENTOLIN HFA    1 Inhaler    Inhale 2 puffs into the lungs every 6 hours as needed for shortness of breath / dyspnea or wheezing    Acute bronchitis, unspecified organism       cholecalciferol 1000 UNIT tablet    vitamin D3    100 tablet    Take 1 tablet (1,000 Units) by mouth daily    Primary osteoarthritis of both knees, Chronic pain of both shoulders       doxycycline 100 MG tablet    VIBRA-TABS    20 tablet    Take 1 tablet (100 mg) by mouth 2 times daily    Acute bronchitis, unspecified organism       guaiFENesin 200 MG Tabs tablet    ORGANIDIN    30 tablet    Take 2 tablets (400 mg) by mouth every 4 hours as needed for cough    Acute bronchitis, unspecified organism       naproxen 500 MG tablet    NAPROSYN    30 tablet    Take 1 tablet (500 mg) by mouth 2 times daily as needed for moderate pain    Primary osteoarthritis of both knees, Chronic pain of both shoulders       order for DME     1 Device    Equipment being ordered: CPAP supplies (mask, tubing etc.)    Sleep apnea       sertraline 100 MG tablet    ZOLOFT    90 tablet    Take 1 tablet (100 mg) by mouth daily    Recurrent major depressive disorder, in partial remission (H)       tamsulosin 0.4 MG capsule    FLOMAX    30 capsule    Take 1 capsule (0.4 mg) by mouth daily    Elevated prostate specific antigen (PSA)

## 2018-01-11 NOTE — TELEPHONE ENCOUNTER
Sharee, Interpretor calls. Pt's wife contacted her today reporting pt has increased shortness of breath, fever and was awake most of the night with coughing and hiccups. Pt is not on the phone with interpretor - unable to get additional information regarding symptoms.     Sharee does state pt was seen yesterday in our office. Reviewed notes from Dr Verma, she thought symptoms were viral, gave prescription for Musinex and flu shot. Advised Sharee that fever could be from the flu shot, she states pt's wife was very concerned about his symptoms and wanted him to be seen today. Appt scheduled with Dr. Garcia at 1:30 today. Advised Sharee to have pt's wife take him to the ER if his symptoms worsen, he feels faint or that he can't get a breath in. She will inform pt and his wife of this.

## 2018-01-11 NOTE — PATIENT INSTRUCTIONS
Please have your Xray done at 1st floor.    Then, please  antibiotic prescription and inhaler at the pharmacy.  Ask the pharmacist to show you how to use the inhaler

## 2018-01-11 NOTE — PROGRESS NOTES
"  SUBJECTIVE:   Man Yamilet Gonzalez is a 72 year old male who presents to clinic today for the following health issues:    Acute upper respiratory illness  History obtained through help of daughter who interpreted    He reports productive cough, subjective fevers, rhinorrhea, and sinus congestion over the last week    No chest pain or shortness of breath    He was given otc decongestive and suggested supportive cares after UC appointment     Problem list and histories reviewed & adjusted, as indicated.  Additional history: as documented    Patient Active Problem List   Diagnosis     Advanced directives, counseling/discussion     Hyperlipidemia LDL goal <160     Health Care Home     Major depression     KULWANT (obstructive sleep apnea)     Gait instability     Mixed incontinence     Type 2 diabetes mellitus without complication, without long-term current use of insulin (H)     Primary osteoarthritis of both knees     History reviewed. No pertinent surgical history.    Social History   Substance Use Topics     Smoking status: Former Smoker     Types: Cigarettes     Quit date: 9/12/2001     Smokeless tobacco: Never Used     Alcohol use No     Family History   Problem Relation Age of Onset     Unknown/Adopted Mother      Unknown/Adopted Father              Reviewed and updated as needed this visit by clinical staffTobacco  Allergies  Meds  Med Hx  Surg Hx  Fam Hx  Soc Hx      Reviewed and updated as needed this visit by Provider         ROS:  Constitutional, HEENT, cardiovascular, pulmonary, gi and gu systems are negative, except as otherwise noted.      OBJECTIVE:   /70 (BP Location: Left arm, Patient Position: Chair, Cuff Size: Adult Regular)  Pulse 95  Temp 97.9  F (36.6  C) (Oral)  Ht 5' 5.5\" (1.664 m)  Wt 124 lb 14.4 oz (56.7 kg)  BMI 20.47 kg/m2  Body mass index is 20.47 kg/(m^2).  GENERAL: healthy, alert and no distress  EYES: Eyes grossly normal to inspection, PERRL and conjunctivae and sclerae " normal  HENT: ++erythematous oropharnyx  NECK: no adenopathy, no asymmetry, masses, or scars and thyroid normal to palpation  RESP: lungs clear to auscultation - no rales, rhonchi or wheezes  CV: regular rate and rhythm, normal S1 S2, no S3 or S4, no murmur, click or rub, no peripheral edema and peripheral pulses strong  ABDOMEN: soft, nontender, no hepatosplenomegaly, no masses and bowel sounds normal  MS: no gross musculoskeletal defects noted, no edema    Diagnostic Test Results:  No results found for this or any previous visit (from the past 24 hour(s)).    ASSESSMENT/PLAN:         (J20.9) Acute bronchitis, unspecified organism  (primary encounter diagnosis)  Comment: Patient reports cough, subjective fevers.  Will order cxr to rule out pna.  Likely is a viral induced acute bronchitis. Have prescribed inhaler and instructed them to take doxycyline if symptoms don't improved.  Continue with supportive care  Plan: doxycycline (VIBRA-TABS) 100 MG tablet,         albuterol (PROAIR HFA/PROVENTIL HFA/VENTOLIN         HFA) 108 (90 BASE) MCG/ACT Inhaler, XR Chest 2         Views                  Frankie Garcia MD  [facility]

## 2018-01-11 NOTE — NURSING NOTE
"Chief Complaint   Patient presents with     Shortness of Breath     Wife in room.       Initial /70 (BP Location: Left arm, Patient Position: Chair, Cuff Size: Adult Regular)  Pulse 95  Temp 97.9  F (36.6  C) (Oral)  Ht 5' 5.5\" (1.664 m)  Wt 124 lb 14.4 oz (56.7 kg)  BMI 20.47 kg/m2 Estimated body mass index is 20.47 kg/(m^2) as calculated from the following:    Height as of this encounter: 5' 5.5\" (1.664 m).    Weight as of this encounter: 124 lb 14.4 oz (56.7 kg).  Medication Reconciliation: complete    "

## 2018-01-12 ENCOUNTER — TELEPHONE (OUTPATIENT)
Dept: INTERNAL MEDICINE | Facility: CLINIC | Age: 73
End: 2018-01-12

## 2018-01-12 DIAGNOSIS — J11.1 INFLUENZA-LIKE ILLNESS: ICD-10-CM

## 2018-01-12 RX ORDER — OSELTAMIVIR PHOSPHATE 75 MG/1
75 CAPSULE ORAL DAILY
Qty: 5 CAPSULE | Refills: 0 | Status: SHIPPED | OUTPATIENT
Start: 2018-01-12 | End: 2018-10-24

## 2018-01-12 RX ORDER — OSELTAMIVIR PHOSPHATE 75 MG/1
75 CAPSULE ORAL DAILY
Qty: 5 CAPSULE | Refills: 0 | Status: SHIPPED | OUTPATIENT
Start: 2018-01-12 | End: 2018-01-12

## 2018-01-12 NOTE — TELEPHONE ENCOUNTER
Pts daughter costa stating pt saw Dr Garcia yesterday for bronchitis and now her two sons were diagnosed with influenza A. They all live in the same home.     Reviewed with Dr Enamorado. He ordered Tamiflu for the pt.     callb ack to dtr and advised. She agrees.

## 2018-01-18 ENCOUNTER — OFFICE VISIT (OUTPATIENT)
Dept: UROLOGY | Facility: CLINIC | Age: 73
End: 2018-01-18
Payer: COMMERCIAL

## 2018-01-18 VITALS
SYSTOLIC BLOOD PRESSURE: 110 MMHG | DIASTOLIC BLOOD PRESSURE: 60 MMHG | BODY MASS INDEX: 19.46 KG/M2 | HEART RATE: 80 BPM | WEIGHT: 124 LBS | HEIGHT: 67 IN

## 2018-01-18 DIAGNOSIS — R35.0 URINARY FREQUENCY: Primary | ICD-10-CM

## 2018-01-18 DIAGNOSIS — R35.0 URINARY FREQUENCY: ICD-10-CM

## 2018-01-18 DIAGNOSIS — R97.20 ELEVATED PROSTATE SPECIFIC ANTIGEN (PSA): ICD-10-CM

## 2018-01-18 LAB
ALBUMIN UR-MCNC: NEGATIVE MG/DL
APPEARANCE UR: CLEAR
BILIRUB UR QL STRIP: NEGATIVE
COLOR UR AUTO: YELLOW
GLUCOSE UR STRIP-MCNC: NEGATIVE MG/DL
HGB UR QL STRIP: ABNORMAL
KETONES UR STRIP-MCNC: NEGATIVE MG/DL
LEUKOCYTE ESTERASE UR QL STRIP: NEGATIVE
NITRATE UR QL: NEGATIVE
PH UR STRIP: 6 PH (ref 5–7)
RESIDUAL VOLUME (RV) (EXTERNAL): 4
SOURCE: ABNORMAL
SP GR UR STRIP: 1.01 (ref 1–1.03)
UROBILINOGEN UR STRIP-ACNC: 0.2 EU/DL (ref 0.2–1)

## 2018-01-18 PROCEDURE — 81003 URINALYSIS AUTO W/O SCOPE: CPT | Mod: QW | Performed by: UROLOGY

## 2018-01-18 PROCEDURE — 51798 US URINE CAPACITY MEASURE: CPT | Performed by: UROLOGY

## 2018-01-18 PROCEDURE — 99213 OFFICE O/P EST LOW 20 MIN: CPT | Mod: 25 | Performed by: UROLOGY

## 2018-01-18 RX ORDER — TAMSULOSIN HYDROCHLORIDE 0.4 MG/1
0.4 CAPSULE ORAL DAILY
Qty: 90 CAPSULE | Refills: 3 | Status: SHIPPED | OUTPATIENT
Start: 2018-01-18 | End: 2022-04-30

## 2018-01-18 ASSESSMENT — PAIN SCALES - GENERAL: PAINLEVEL: NO PAIN (0)

## 2018-01-18 NOTE — PROGRESS NOTES
"  CHIEF COMPLAINT  It was my pleasure to see Josh Gonzalez who is a 72 year old male for follow-up of elevated PSA and BPH.      HPI  Josh Gonzalez is a very pleasant 72 year old male who presents with a history of elevated PSA and LUTS. Was started on tamsulosin. Also has had repeat PSA and was 3.8 on last check, 21% free PSA. States urinary symptoms are improved on tamsulosin. Emptying well. Occasional mild dribbling, but stream usually strong.    PHYSICAL EXAM  Vitals:    01/18/18 1503   BP: 110/60   Pulse: 80   Weight: 56.2 kg (124 lb)   Height: 1.702 m (5' 7\")     Constitutional: Alert, no acute distress  Psychiatric: Normal mood and affect    ASSESSMENT and PLAN  72 year old male with BPH and urinary frequency and urgency. Nocturia improved. PSA was improved on recheck. Recommend continue on tamsulosin - refilled today. Regarding PSA, we discussed that this was within a reasonable range at 3.8 on recheck and biopsy would likely not be of benefit at this time. He is above the age at which we would typically continue routine screening, but he will discuss this with his PCP in 1 year.    - Continue tamsulosin 0.4mg daily  - Follow-up prn    I spent over 15 minutes with the patient.  Over half this time was spent on counseling regarding elevated PSA and BPH.    Wilman Chacko MD  Urology  [facility]  Clinic Phone [phone]      "

## 2018-01-18 NOTE — LETTER
"1/18/2018      RE: Josh Gonzalez  [address] [address]         CHIEF COMPLAINT  It was my pleasure to see Man Yamilet Gonzalez who is a 72 year old male for follow-up of elevated PSA and BPH.      HPI  Josh Gonzalez is a very pleasant 72 year old male who presents with a history of elevated PSA and LUTS. Was started on tamsulosin. Also has had repeat PSA and was 3.8 on last check, 21% free PSA. States urinary symptoms are improved on tamsulosin. Emptying well. Occasional mild dribbling, but stream usually strong.    PHYSICAL EXAM  Vitals:    01/18/18 1503   BP: 110/60   Pulse: 80   Weight: 56.2 kg (124 lb)   Height: 1.702 m (5' 7\")     Constitutional: Alert, no acute distress  Psychiatric: Normal mood and affect    ASSESSMENT and PLAN  72 year old male with BPH and urinary frequency and urgency. Nocturia improved. PSA was improved on recheck. Recommend continue on tamsulosin - refilled today. Regarding PSA, we discussed that this was within a reasonable range at 3.8 on recheck and biopsy would likely not be of benefit at this time. He is above the age at which we would typically continue routine screening, but he will discuss this with his PCP in 1 year.    - Continue tamsulosin 0.4mg daily  - Follow-up prn    I spent over 15 minutes with the patient.  Over half this time was spent on counseling regarding elevated PSA and BPH.    Wilman Chacko MD  Urology  [facility]  Clinic Phone [phone]          "

## 2018-01-18 NOTE — MR AVS SNAPSHOT
"              After Visit Summary   2018    Josh Gonzalez    MRN: [id number]           Patient Information     Date Of Birth          1945        Visit Information        Provider Department      2018 2:15 PM Wilman Chacko MD; [employer] [facility] [place]        Today's Diagnoses     Urinary frequency        Elevated prostate specific antigen (PSA)           Follow-ups after your visit        Who to contact     If you have questions or need follow up information about today's clinic visit or your schedule please contact [facility] [place] directly at [phone].  Normal or non-critical lab and imaging results will be communicated to you by [employer], letter or phone within 4 business days after the clinic has received the results. If you do not hear from us within 7 days, please contact the clinic through [employer] or phone. If you have a critical or abnormal lab result, we will notify you by phone as soon as possible.  Submit refill requests through [employer] or call your pharmacy and they will forward the refill request to us. Please allow 3 business days for your refill to be completed.          Additional Information About Your Visit        MyChart Information     [employer] lets you send messages to your doctor, view your test results, renew your prescriptions, schedule appointments and more. To sign up, go to [email or web address] . Click on \"Log in\" on the left side of the screen, which will take you to the Welcome page. Then click on \"Sign up Now\" on the right side of the page.     You will be asked to enter the access code listed below, as well as some personal information. Please follow the directions to create your username and password.     Your access code is: [email or web address]  Expires: 4/10/2018 10:48 AM     Your access code will  in 90 days. If you need help or a new code, please " "call your [facility] clinic or [phone].        Care EveryWhere ID     This is your Care EveryWhere ID. This could be used by other organizations to access your [facility] medical records  [id number]        Your Vitals Were     Pulse Height BMI (Body Mass Index)             80 1.702 m (5' 7\") 19.42 kg/m2          Blood Pressure from Last 3 Encounters:   01/18/18 110/60   01/11/18 118/70   01/10/18 126/62    Weight from Last 3 Encounters:   01/18/18 56.2 kg (124 lb)   01/11/18 56.7 kg (124 lb 14.4 oz)   01/10/18 57.2 kg (126 lb)              We Performed the Following     MEASURE POST-VOID RESIDUAL URINE/BLADDER CAPACITY, US NON-IMAGING (05113)     UA without Microscopic          Where to get your medicines      These medications were sent to [id number] - [place], [place] - [address]  [address] [place] [address] [address], [facility] [address]     Phone:  [phone]     tamsulosin 0.4 MG capsule          Primary Care Provider Office Phone # Fax #    Ray Sun [phone]       [address] [facility] [address]        Equal Access to Services     AMARILYS SOLOMON : Hadii aad ku hadasho Soomaali, waaxda luqadaha, qaybta kaalmada adeegyada, barry kumar. So [facility] [phone].    ATENCIÓN: Si habla español, tiene a lilly disposición servicios gratuitos de asistencia lingüística. Llame al [phone].    We comply with applicable federal civil rights laws and [place] laws. We do not discriminate on the basis of race, color, national origin, age, disability, sex, sexual orientation, or gender identity.            Thank you!     Thank you for choosing [facility] [place]  for your care. Our goal is always to provide you with excellent care. Hearing back from our patients is one way we can continue to improve our services. Please take a few minutes to complete the written survey that you may receive in the mail after your " visit with us. Thank you!             Your Updated Medication List - Protect others around you: Learn how to safely use, store and throw away your medicines at www.disposemymeds.org.          This list is accurate as of: 1/18/18  3:18 PM.  Always use your most recent med list.                   Brand Name Dispense Instructions for use Diagnosis    acetaminophen 325 MG tablet    TYLENOL    100 tablet    Take 2 tablets (650 mg) by mouth every 6 hours as needed for mild pain    Primary osteoarthritis of both knees, Chronic pain of both shoulders       albuterol 108 (90 BASE) MCG/ACT Inhaler    PROAIR HFA/PROVENTIL HFA/VENTOLIN HFA    1 Inhaler    Inhale 2 puffs into the lungs every 6 hours as needed for shortness of breath / dyspnea or wheezing    Acute bronchitis, unspecified organism       cholecalciferol 1000 UNIT tablet    vitamin D3    100 tablet    Take 1 tablet (1,000 Units) by mouth daily    Primary osteoarthritis of both knees, Chronic pain of both shoulders       doxycycline 100 MG tablet    VIBRA-TABS    20 tablet    Take 1 tablet (100 mg) by mouth 2 times daily    Acute bronchitis, unspecified organism       guaiFENesin 200 MG Tabs tablet    ORGANIDIN    30 tablet    Take 2 tablets (400 mg) by mouth every 4 hours as needed for cough    Acute bronchitis, unspecified organism       naproxen 500 MG tablet    NAPROSYN    30 tablet    Take 1 tablet (500 mg) by mouth 2 times daily as needed for moderate pain    Primary osteoarthritis of both knees, Chronic pain of both shoulders       order for DME     1 Device    Equipment being ordered: CPAP supplies (mask, tubing etc.)    Sleep apnea       oseltamivir 75 MG capsule    TAMIFLU    5 capsule    Take 1 capsule (75 mg) by mouth daily    Influenza-like illness       sertraline 100 MG tablet    ZOLOFT    90 tablet    Take 1 tablet (100 mg) by mouth daily    Recurrent major depressive disorder, in partial remission (H)       tamsulosin 0.4 MG capsule    FLOMAX    90  capsule    Take 1 capsule (0.4 mg) by mouth daily    Elevated prostate specific antigen (PSA)

## 2018-01-26 ENCOUNTER — TELEPHONE (OUTPATIENT)
Dept: INTERNAL MEDICINE | Facility: CLINIC | Age: 73
End: 2018-01-26

## 2018-01-26 NOTE — TELEPHONE ENCOUNTER
Fax received from [facility] for review and signature.  Put in Dr. uSn's in basket.

## 2018-04-11 ENCOUNTER — PATIENT OUTREACH (OUTPATIENT)
Dept: GERIATRIC MEDICINE | Facility: CLINIC | Age: 73
End: 2018-04-11

## 2018-04-11 NOTE — PROGRESS NOTES
[facility] Care Coordination Contact    [facility] Six-Month Telephone Assessment    6 month telephone assessment completed on 4/11/18 with daughter alvarado.    ER visits: No  Hospitalizations: No  TCU stays: no  Significant health status changes: Daughter reports that clients memory is getting worse.  Falls/Injuries: No  ADL/IADL changes: No  Changes in services: No    Caregiver Assessment follow up:  na    Goals: See POC in chart for goal progress documentation.      Will see member in 6 months for an annual health risk assessment.   Encouraged member to call CC with any questions or concerns in the meantime.  VIRGINIA Durbin, [facility]   Tel [phone]  Fax [phone]

## 2018-04-12 ENCOUNTER — PATIENT OUTREACH (OUTPATIENT)
Dept: GERIATRIC MEDICINE | Facility: CLINIC | Age: 73
End: 2018-04-12

## 2018-04-13 NOTE — PROGRESS NOTES
[facility] Care Coordination Contact  Received voice mail message from Sharee Manzano  stating that client did not receive his Ensure this month.  Emailed Apoorva yesterday at [employer] and CMS emailed Armin today at [employer].  Awaiting response back.  VIRGINIA Durbin, [facility]   Tel [phone]  Fax [phone]

## 2018-04-16 NOTE — PROGRESS NOTES
[facility] Care Coordination Contact  [facility] indicating that client's Ensure was shipped on 4/10.  VIRGINIA Durbin, [facility]   Tel [phone]  Fax [phone]

## 2018-04-21 DIAGNOSIS — F33.41 RECURRENT MAJOR DEPRESSIVE DISORDER, IN PARTIAL REMISSION (H): ICD-10-CM

## 2018-04-23 RX ORDER — SERTRALINE HYDROCHLORIDE 100 MG/1
TABLET, FILM COATED ORAL
Qty: 90 TABLET | Refills: 2 | Status: SHIPPED | OUTPATIENT
Start: 2018-04-23 | End: 2018-10-24

## 2018-04-23 NOTE — TELEPHONE ENCOUNTER
"Requested Prescriptions   Pending Prescriptions Disp Refills     sertraline (ZOLOFT) 100 MG tablet [Pharmacy Med Name: Sertraline HCl Oral Tablet 100 MG] 90 tablet 2     Sig: TAKE ONE TABLET BY MOUTH ONE TIME DAILY    SSRIs Protocol Failed    4/21/2018  7:01 AM       Failed - PHQ-9 score less than 5 in past 6 months    Please review last PHQ-9 score.          Passed - Patient is age 18 or older       Passed - Recent (6 mo) or future (30 days) visit within the authorizing provider's specialty    Patient had office visit in the last 6 months or has a visit in the next 30 days with authorizing provider or within the authorizing provider's specialty.  See \"Patient Info\" tab in inbasket, or \"Choose Columns\" in Meds & Orders section of the refill encounter.              Corinne-do you want to see back for diabetes check up?     "

## 2018-08-21 ENCOUNTER — TELEPHONE (OUTPATIENT)
Dept: INTERNAL MEDICINE | Facility: CLINIC | Age: 73
End: 2018-08-21

## 2018-09-17 ENCOUNTER — PATIENT OUTREACH (OUTPATIENT)
Dept: GERIATRIC MEDICINE | Facility: CLINIC | Age: 73
End: 2018-09-17

## 2018-09-18 NOTE — PROGRESS NOTES
[facility] Care Coordination Contact  Received call from Sharee  stating that client's wife called her to see if care coordinator was seeing her also.  LM telling her she would be seen in December. Asked Sharee if she can interpret for visit on 9/27 and she is able to.  Informed KTT of this.  VIRGINIA Durbin, [facility] Care Coordinator  Tel [phone]  Fax [phone]    
[facility] Care Coordination Contact  Spoke with client by phone with embraasee phone .  Scheduled annual visit and PCA assessment for Sept 27th at 11 am. Requested  from FABIENNE.  VIRGINIA Durbin, [facility] Care Coordinator  Tel [phone]  Fax [phone]    
Stable

## 2018-09-27 ENCOUNTER — PATIENT OUTREACH (OUTPATIENT)
Dept: GERIATRIC MEDICINE | Facility: CLINIC | Age: 73
End: 2018-09-27

## 2018-09-27 ASSESSMENT — ACTIVITIES OF DAILY LIVING (ADL): DEPENDENT_IADLS:: CLEANING;COOKING;LAUNDRY;SHOPPING;MEAL PREPARATION;MONEY MANAGEMENT;TRANSPORTATION

## 2018-09-27 NOTE — PROGRESS NOTES
[facility] Care Coordination Contact    [facility] Home Visit Assessment     Home visit for Health Risk Assessment with Josh Gonzalez completed on September 27, 2018    Type of residence:: Private home - stairs  Current living arrangement:: I live in a private home with family     Assessment completed with:: Patient, , and wife.    Current Care Plan  Member currently receiving the following home care services:   none  Member currently receiving the following community resources: None      Medication Review  Medication reconciliation completed in Epic: Yes  Medication set-up & administration: Independent-does not set up.  Family caregiver administers medications.  Medication understanding concerns (by member, family or CC): No  Medication adherence concerns (by member, family or CC): No    Mental/Behavioral Health   Depression Screening: See PHQ assessment flowsheet.   Mental health DX:: Yes   Mental health DX now managed:: Medication (major depression)  No current MH services- no referrals needed    Falls Assessment:   Fallen 2 or more times in the past year?: No   Any fall with injury in the past year?: Yes 1 recent fall    ADL/IADL Dependencies:   Dependent ADLs:: Ambulation-cane, Bathing, Dressing, Grooming, Transfers  Dependent IADLs:: Cleaning, Cooking, Laundry, Shopping, Meal Preparation, Money Management, Transportation    [facility] sponsored benefits: Shared information re: Silver Sneakers/gym memberships, ASA, Calcium +D.    PCA Assessment completed at visit: Yes     Elderly Waiver Eligibility: Yes-will continue on EW    Care Plan & Recommendations: Continue PCA.  ADC will be increased from 2 to 3 days per week.  Incontinence male guards stopped per client request.  Ensure will continue.  care coordinator will send information on the [facility] in [place].  Explained that senior housing waiting lists are currently closed in [facility].     See [facility] for detailed  assessment information.    Follow-Up Plan: Member informed of future contact, plan to f/u with member with a 6 month telephone assessment.  Contact information shared with member and family, encouraged member to call with any questions or concerns at any time.    [place] care continuum providers: Please refer to Health Care Home on the Epic Problem List to view this patient's [facility] Care Plan Summary.  VIRGINIA Durbin, [facility] Care Coordinator  Tel [phone]  Fax [phone]

## 2018-10-02 ENCOUNTER — PATIENT OUTREACH (OUTPATIENT)
Dept: GERIATRIC MEDICINE | Facility: CLINIC | Age: 73
End: 2018-10-02

## 2018-10-02 NOTE — PROGRESS NOTES
[facility] Care Coordination Contact  Received after visit chart from care coordinator.  Completed following tasks: Mailed copy of care plan to client, Updated services in access, Submitted referrals/auths for ADC and Entered MMIS   [employer]:  Faxed completed PCA assessment to PCA Agency and mailed copies to member.  Faxed MD Communication to PCP.  Emailed referral form for auth to [employer].  Provider Signature - No POC Shared:  Member indicates that they do not want their POC shared with any EW providers.  Chart was returned to CC.   Elenita Lowery  Case Management Specialist  [facility]  [phone]

## 2018-10-02 NOTE — LETTER
October 2, 2018    Important Plan Information    MAN PIU SG  [address] [facility] [address]  Your Care Plan  Dear Man,  When we spoke recently, I promised to send you a Care Plan. The plan enclosed is a summary of our discussion. It includes the steps we agreed would help you meet your health goals. In addition, I can help you with:  Egwhhwn-X-OsmyXW  This program is available to members who need a ride to medical and dental visits. To schedule a ride, call [phone] or [phone] (toll free). TTY/TTD: 711. You can call Monday - Thursday 8 a.m. to 5 p.m. and Fridays 9 a.m. to 5 p.m.   [employer]   The [employer] program empowers you to improve your health through education and exercise. To learn more, visit [employer]er | VIP, or call [employer] Customer Service at [phone] (toll free) (TTY:711) from 7 a.m. - 7 p.m. Central Time, Monday-Friday.  Health Care Directive   This form helps you outline your health care wishes. You can request a form from me and I will answer any questions you have before you discuss it with your doctor.   Annual Physical  Take a key step on your path to good health and set up an annual physical at your clinic.  Questions?  Call me at [phone] Monday-Friday between 8am and 5pm.  TTY/TTD: 711. As we discussed, I plan to be in touch with you again in 6 months to follow up via phone.  Sincerely,      VIRGINIA Durbin, [facility]  [phone]    cc: member records            American Indians can continue to use [facility] and [facility] ([facility]) clinics. We will not require prior approval or impose any conditions for you to get services at these clinics. For elders age 65 years and older this includes Elderly Waiver (EW) services accessed through the [place]. If a doctor or other provider in a [facility] or [facility] clinic refers you to a provider in our network, we will not require you to see your primary care provider prior to the  referral.    For accessible formats of this publication or assistance with equal or access to our services, visit [employer] ([employer])/contactmedicaid, or call [phone] (toll free) or use your preferred relay service.    Auxiliary Aids and Services.   [facility] provides auxiliary aids and services, like qualified interpreters or information in accessible formats, free of charge and in a timely manner, to ensure an equal opportunity to participate in our health care programs. Contact [facility] Customer Service at [employer] ([employer])/contactmedicaid or call [phone] (toll free) or use your preferred relay service.    Language Assistance Services.   [facility] provides translated documents and spoken language interpreting, free of charge and in a timely manner, when language assistance services are necessary to ensure limited English speakers have meaningful access to our information and services. Contact [employer] Service at [employer] ([employer])/contactmedicaid or call [phone] (toll free) or use your preferred relay service.     Civil Rights Notice  Discrimination is against the law. [facility] does not discriminate on the basis of any of the following:    Race    Color    National Origin    Creed    Anabaptist    Age    Public Assistance Status    Receipt of Health Care Services    Disability (including physical or mental impairment)    Sex (including sex stereotypes and gender identity)    Marital Status    Political Beliefs    Medical Condition    Genetic Information    Sexual Orientation    Claims Experience    Medical History    Health Status    Civil Rights Complaints.   You have the right to file a discrimination complaint if you believe you were treated in a discriminatory way by [facility]. You may contact any of the following four agencies directly to file a discrimination complaint.    U.S. Department of Health and Human Services  Office for Civil Rights (OCR)  You have the right to file a complaint with the OCR, a federal agency,  if you believe you have been discriminated against because of any of the following:    Race    Disability    Color    Sex (including sex stereotypes and gender identity)    National Origin    Age    Contact the OCR directly to file a complaint:         Director         U.S. Department of Health and Human Services  Office for Civil Rights         [address] [place] [address]         [address] [address]         [phone] (Voice)         [phone] (TDD)         Complaint Portal - [email or web address]     [place] Department of Human Rights (MDHR)  In [place], you have the right to file a complaint with the MDHR if you believe you have been discriminated against because of any of the following:      Race    Color    National Origin    Sikhism    Creed    Sex    Sexual Orientation    Marital Status    Public Assistance Status    Contact the MD directly to file a complaint:         [place] Department of Human Rights         [address] [address]         [phone] (voice)          [phone] (toll free)         711 or [phone] ([place] Relay)         [phone] (Fax)         [email or web address] (Email)     [place] Department of Human Services (DHS)  You have the right to file a complaint with [facility] if you believe you have been discriminated against in our health care programs because of any of the following:    Race    Color    National Origin    Creed    Sikhism    Age    Public Assistance Status    Receipt of Health Care Services    Disability (including physical or mental impairment)    Sex (including sex stereotypes and gender identity)    Marital Status    Political Beliefs    Medical Condition    Genetic Information    Sexual Orientation    Claims Experience    Medical History    Health Status    Complaints must be in writing and filed within 180 days of the date you discovered the  alleged discrimination. The complaint must contain your name and address and describe the discrimination you are complaining about. After we get your complaint, we will review it and notify you in writing about whether we have authority to investigate. If we do, we will investigate the complaint.      [facility] will notify you in writing of the investigation s outcome. You have a right to appeal the outcome if you disagree with the decision. To appeal, you must send a written request to have [facility] review the investigation outcome period. Be brief and state why you disagree with the decision. Include additional information you think is important.      If you file a complaint in this way, the people who work for the agency named in the complaint cannot retaliate against you. This means they cannot punish you in any way for filing a complaint. Filing a complaint in this way does not stop you from seeking out other legal or administration actions.     Contact [facility] directly to file a discrimination complaint:        ATTN: Civil Rights Coordinator        [place] Department of Human [facility]        Equal Opportunity and Access Division        [address] [address]        [place] [address] [phone] (voice) or use your preferred relay service     [facility] Complaint Notice   Contact [facility] directly to file a discrimination complaint:  [facility] Civil Rights Coordinator  Mail Route   [address]  [place] [address] [phone] (voice) or use your preferred relay service  [email or web address]

## 2018-10-03 ENCOUNTER — TELEPHONE (OUTPATIENT)
Dept: INTERNAL MEDICINE | Facility: CLINIC | Age: 73
End: 2018-10-03

## 2018-10-03 NOTE — LETTER
[facility]  [address], [address]  [place] [address]  [phone]        October 31, 2018    Josh Gonzalez  [address] [facility] [address]            Dear  Josh Gonzalez:    We sent you a letter a couple of weeks ago informing you of health maintenance that is due. We hope that you received it. This letter is just a follow up to remind you to schedule an appointment.         Sincerely,        Your [facility] Care Team

## 2018-10-03 NOTE — LETTER
[facility]  [address], [address]  [place] [address]  [phone]        October 3, 2018    Josh Gonzalez  [address] [facility] [address]            Dear Josh Woodard:    In order to ensure we are providing the best quality care, we have reviewed your chart and see that you are due for a follow up on your diabetes.  Please call the clinic at your earliest convenience to schedule an appointment.   Thank you for trusting us with your health care.    Sincerely,        Dr. Frankie Garcia

## 2018-10-03 NOTE — TELEPHONE ENCOUNTER
Panel Management Review      Patient has the following on his problem list:     Diabetes    ASA: Failed    Last A1C  Lab Results   Component Value Date    A1C 5.8 08/01/2017    A1C 6.0 07/05/2016    A1C 6.3 11/17/2015     A1C tested: Passed    Last LDL:    Lab Results   Component Value Date    CHOL 218 08/01/2017     Lab Results   Component Value Date    HDL 58 08/01/2017     Lab Results   Component Value Date     08/01/2017     Lab Results   Component Value Date    TRIG 176 08/01/2017     Lab Results   Component Value Date    CHOLHDLRATIO 3.6 11/01/2014     Lab Results   Component Value Date    NHDL 160 08/01/2017       Is the patient on a Statin? NO             Is the patient on Aspirin? NO        Last three blood pressure readings:  BP Readings from Last 3 Encounters:   01/18/18 110/60   01/11/18 118/70   01/10/18 126/62       Date of last diabetes office visit: 1/10/18     Tobacco History:     History   Smoking Status     Former Smoker     Types: Cigarettes     Quit date: 9/12/2001   Smokeless Tobacco     Never Used           Composite cancer screening  Chart review shows that this patient is due/due soon for the following Colonoscopy  Summary:    Patient is due/failing the following:   Diabetes    Action needed:   Patient needs office visit for a follow up on diabetes.    Type of outreach:    Sent letter.    Questions for provider review:    None                                                                                                                                    Mary Alice Norman MA       Chart routed to none .

## 2018-10-24 ENCOUNTER — RADIANT APPOINTMENT (OUTPATIENT)
Dept: GENERAL RADIOLOGY | Facility: CLINIC | Age: 73
End: 2018-10-24
Attending: INTERNAL MEDICINE
Payer: COMMERCIAL

## 2018-10-24 ENCOUNTER — OFFICE VISIT (OUTPATIENT)
Dept: INTERNAL MEDICINE | Facility: CLINIC | Age: 73
End: 2018-10-24
Payer: COMMERCIAL

## 2018-10-24 VITALS
SYSTOLIC BLOOD PRESSURE: 138 MMHG | WEIGHT: 126 LBS | BODY MASS INDEX: 19.78 KG/M2 | RESPIRATION RATE: 16 BRPM | HEART RATE: 61 BPM | HEIGHT: 67 IN | DIASTOLIC BLOOD PRESSURE: 74 MMHG | OXYGEN SATURATION: 96 % | TEMPERATURE: 98.4 F

## 2018-10-24 DIAGNOSIS — Z12.5 SCREENING FOR PROSTATE CANCER: ICD-10-CM

## 2018-10-24 DIAGNOSIS — M54.50 BILATERAL LOW BACK PAIN WITHOUT SCIATICA, UNSPECIFIED CHRONICITY: ICD-10-CM

## 2018-10-24 DIAGNOSIS — M17.0 PRIMARY OSTEOARTHRITIS OF BOTH KNEES: ICD-10-CM

## 2018-10-24 DIAGNOSIS — Z12.11 SPECIAL SCREENING FOR MALIGNANT NEOPLASMS, COLON: ICD-10-CM

## 2018-10-24 DIAGNOSIS — Z02.9 ADMINISTRATIVE ENCOUNTER: ICD-10-CM

## 2018-10-24 DIAGNOSIS — F33.41 RECURRENT MAJOR DEPRESSIVE DISORDER, IN PARTIAL REMISSION (H): ICD-10-CM

## 2018-10-24 DIAGNOSIS — E11.9 TYPE 2 DIABETES MELLITUS WITHOUT COMPLICATION, WITHOUT LONG-TERM CURRENT USE OF INSULIN (H): ICD-10-CM

## 2018-10-24 DIAGNOSIS — Z00.00 ENCOUNTER FOR PREVENTATIVE ADULT HEALTH CARE EXAMINATION: Primary | ICD-10-CM

## 2018-10-24 DIAGNOSIS — E78.5 HYPERLIPIDEMIA LDL GOAL <160: ICD-10-CM

## 2018-10-24 LAB
ALBUMIN UR-MCNC: NEGATIVE MG/DL
APPEARANCE UR: CLEAR
BILIRUB UR QL STRIP: NEGATIVE
COLOR UR AUTO: YELLOW
ERYTHROCYTE [DISTWIDTH] IN BLOOD BY AUTOMATED COUNT: 15 % (ref 10–15)
GLUCOSE UR STRIP-MCNC: NEGATIVE MG/DL
HBA1C MFR BLD: 5.8 % (ref 0–5.6)
HCT VFR BLD AUTO: 43 % (ref 40–53)
HGB BLD-MCNC: 13.8 G/DL (ref 13.3–17.7)
HGB UR QL STRIP: ABNORMAL
KETONES UR STRIP-MCNC: NEGATIVE MG/DL
LEUKOCYTE ESTERASE UR QL STRIP: NEGATIVE
MCH RBC QN AUTO: 25 PG (ref 26.5–33)
MCHC RBC AUTO-ENTMCNC: 32.1 G/DL (ref 31.5–36.5)
MCV RBC AUTO: 78 FL (ref 78–100)
NITRATE UR QL: NEGATIVE
PH UR STRIP: 7 PH (ref 5–7)
PLATELET # BLD AUTO: 226 10E9/L (ref 150–450)
RBC # BLD AUTO: 5.52 10E12/L (ref 4.4–5.9)
RBC #/AREA URNS AUTO: ABNORMAL /HPF
SOURCE: ABNORMAL
SP GR UR STRIP: 1.01 (ref 1–1.03)
SPERM #/AREA URNS HPF: PRESENT /HPF
UROBILINOGEN UR STRIP-ACNC: 0.2 EU/DL (ref 0.2–1)
WBC # BLD AUTO: 7.1 10E9/L (ref 4–11)
WBC #/AREA URNS AUTO: ABNORMAL /HPF

## 2018-10-24 PROCEDURE — 81001 URINALYSIS AUTO W/SCOPE: CPT | Performed by: INTERNAL MEDICINE

## 2018-10-24 PROCEDURE — 82043 UR ALBUMIN QUANTITATIVE: CPT | Performed by: INTERNAL MEDICINE

## 2018-10-24 PROCEDURE — G0103 PSA SCREENING: HCPCS | Performed by: INTERNAL MEDICINE

## 2018-10-24 PROCEDURE — 72100 X-RAY EXAM L-S SPINE 2/3 VWS: CPT

## 2018-10-24 PROCEDURE — 85027 COMPLETE CBC AUTOMATED: CPT | Performed by: INTERNAL MEDICINE

## 2018-10-24 PROCEDURE — 80061 LIPID PANEL: CPT | Performed by: INTERNAL MEDICINE

## 2018-10-24 PROCEDURE — 84443 ASSAY THYROID STIM HORMONE: CPT | Performed by: INTERNAL MEDICINE

## 2018-10-24 PROCEDURE — 36415 COLL VENOUS BLD VENIPUNCTURE: CPT | Performed by: INTERNAL MEDICINE

## 2018-10-24 PROCEDURE — 80053 COMPREHEN METABOLIC PANEL: CPT | Performed by: INTERNAL MEDICINE

## 2018-10-24 PROCEDURE — 99397 PER PM REEVAL EST PAT 65+ YR: CPT | Performed by: INTERNAL MEDICINE

## 2018-10-24 PROCEDURE — 83036 HEMOGLOBIN GLYCOSYLATED A1C: CPT | Performed by: INTERNAL MEDICINE

## 2018-10-24 RX ORDER — SERTRALINE HYDROCHLORIDE 100 MG/1
100 TABLET, FILM COATED ORAL DAILY
Qty: 90 TABLET | Refills: 3 | Status: SHIPPED | OUTPATIENT
Start: 2018-10-24 | End: 2019-11-18

## 2018-10-24 ASSESSMENT — PATIENT HEALTH QUESTIONNAIRE - PHQ9
SUM OF ALL RESPONSES TO PHQ QUESTIONS 1-9: 13
10. IF YOU CHECKED OFF ANY PROBLEMS, HOW DIFFICULT HAVE THESE PROBLEMS MADE IT FOR YOU TO DO YOUR WORK, TAKE CARE OF THINGS AT HOME, OR GET ALONG WITH OTHER PEOPLE: NOT DIFFICULT AT ALL
SUM OF ALL RESPONSES TO PHQ QUESTIONS 1-9: 13

## 2018-10-24 NOTE — LETTER
[facility]  [address], [address]  [place] [address]  [phone]        October 29, 2018    Josh Gonzalez  [address] [facility] [address]            Dear Mr. Josh Gonzalez:      The results of your recent Tests show Elevated triglycerides, keep diet and exercise.   Rest of the results are normal. .  If you have any further questions or problems, please contact our office.    Sincerely,        Ray Sun M.D.    Results for orders placed or performed in visit on 10/24/18   CBC with platelets   Result Value Ref Range    WBC 7.1 4.0 - 11.0 10e9/L    RBC Count 5.52 4.4 - 5.9 10e12/L    Hemoglobin 13.8 13.3 - 17.7 g/dL    Hematocrit 43.0 40.0 - 53.0 %    MCV 78 78 - 100 fl    MCH 25.0 (L) 26.5 - 33.0 pg    MCHC 32.1 31.5 - 36.5 g/dL    RDW 15.0 10.0 - 15.0 %    Platelet Count 226 150 - 450 10e9/L   Comprehensive metabolic panel   Result Value Ref Range    Sodium 137 133 - 144 mmol/L    Potassium 4.0 3.4 - 5.3 mmol/L    Chloride 101 94 - 109 mmol/L    Carbon Dioxide 29 20 - 32 mmol/L    Anion Gap 7 3 - 14 mmol/L    Glucose 81 70 - 99 mg/dL    Urea Nitrogen 17 7 - 30 mg/dL    Creatinine 1.20 0.66 - 1.25 mg/dL    GFR Estimate 59 (L) >60 mL/min/1.7m2    GFR Estimate If Black 72 >60 mL/min/1.7m2    Calcium 9.1 8.5 - 10.1 mg/dL    Bilirubin Total 0.3 0.2 - 1.3 mg/dL    Albumin 3.5 3.4 - 5.0 g/dL    Protein Total 7.6 6.8 - 8.8 g/dL    Alkaline Phosphatase 69 40 - 150 U/L    ALT 26 0 - 70 U/L    AST 26 0 - 45 U/L   Lipid panel reflex to direct LDL Non-fasting   Result Value Ref Range    Cholesterol 169 <200 mg/dL    Triglycerides 297 (H) <150 mg/dL    HDL Cholesterol 45 >39 mg/dL    LDL Cholesterol Calculated 65 <100 mg/dL    Non HDL Cholesterol 124 <130 mg/dL   TSH with free T4 reflex   Result Value Ref Range    TSH 1.32 0.40 - 4.00 mU/L   Prostate spec antigen screen   Result Value Ref Range    PSA 3.13 0 - 4 ug/L   Hemoglobin A1c   Result Value Ref Range    Hemoglobin A1C 5.8  (H) 0 - 5.6 %   *UA reflex to Microscopic   Result Value Ref Range    Color Urine Yellow     Appearance Urine Clear     Glucose Urine Negative NEG^Negative mg/dL    Bilirubin Urine Negative NEG^Negative    Ketones Urine Negative NEG^Negative mg/dL    Specific Gravity Urine 1.015 1.003 - 1.035    Blood Urine Trace (A) NEG^Negative    pH Urine 7.0 5.0 - 7.0 pH    Protein Albumin Urine Negative NEG^Negative mg/dL    Urobilinogen Urine 0.2 0.2 - 1.0 EU/dL    Nitrite Urine Negative NEG^Negative    Leukocyte Esterase Urine Negative NEG^Negative    Source Midstream Urine    Albumin Random Urine Quantitative with Creat Ratio   Result Value Ref Range    Creatinine Urine 45 mg/dL    Albumin Urine mg/L 19 mg/L    Albumin Urine mg/g Cr 42.44 (H) 0 - 17 mg/g Cr   Urine Microscopic   Result Value Ref Range    WBC Urine 0 - 5 OTO5^0 - 5 /HPF    RBC Urine O - 2 OTO2^O - 2 /HPF    sperm Present (A) NEG^Negative /HPF

## 2018-10-24 NOTE — PROGRESS NOTES
Answers for HPI/ROS submitted by the patient on 10/24/2018   If you checked off any problems, how difficult have these problems made it for you to do your work, take care of things at home, or get along with other people?: Not difficult at all  PHQ9 TOTAL SCORE: 13      SUBJECTIVE:   CC: Josh Gonzalez is an 73 year old male who presents for preventative health visit.     Healthy Habits:    Do you get at least three servings of calcium containing foods daily (dairy, green leafy vegetables, etc.)? yes    Amount of exercise or daily activities, outside of work: none    Problems taking medications regularly No    Medication side effects: No    Have you had an eye exam in the past two years? yes    Do you see a dentist twice per year? no    Do you have sleep apnea, excessive snoring or daytime drowsiness?no       PROBLEMS TO ADD ON...    Has h/o depression. On medical treatment, controlled, no side effects. No depressive symptoms or suicidal ideation.  Has H/O DM. On diet. Blood sugars are controlled. No parestesias. No hypoglycemias.  Has H/O BPH. On treatment with Flomax . No  symptoms of frequency, decreased urinary stream or dysuria. No side effects from medications.    Concern for weakness, knees and LBP, shoulder pain. Has OA, feels pain with ambulation. Has had falls x 2 , loses balance. Needs a cane.       Today's PHQ-2 Score:   PHQ-2 ( 1999 Pf[employer]) 9/27/2018 11/3/2017   Q1: Little interest or pleasure in doing things 1 0   Q2: Feeling down, depressed or hopeless 1 1   PHQ-2 Score 2 1       Abuse: Current or Past(Physical, Sexual or Emotional)- No  Do you feel safe in your environment - Yes    Social History   Substance Use Topics     Smoking status: Former Smoker     Types: Cigarettes     Quit date: 9/12/2001     Smokeless tobacco: Never Used     Alcohol use No      If you drink alcohol do you typically have >3 drinks per day or >7 drinks per week? No                      Last PSA:   PSA   Date Value Ref Range  "Status   08/01/2017 5.14 (H) 0 - 4 ug/L Final     Comment:     Assay Method:  Chemiluminescence using Siemens Vista analyzer       Reviewed orders with patient. Reviewed health maintenance and updated orders accordingly - Yes  Labs reviewed in [facility]IC  BP Readings from Last 3 Encounters:   10/24/18 138/74   01/18/18 110/60   01/11/18 118/70    Wt Readings from Last 3 Encounters:   10/24/18 126 lb (57.2 kg)   01/18/18 124 lb (56.2 kg)   01/11/18 124 lb 14.4 oz (56.7 kg)                    Reviewed and updated as needed this visit by clinical staff  Tobacco  Allergies  Meds  Med Hx  Surg Hx  Fam Hx  Soc Hx        Reviewed and updated as needed this visit by Provider            ROS:  CONSTITUTIONAL: NEGATIVE for fever, chills, change in weight  INTEGUMENTARY/SKIN: NEGATIVE for worrisome rashes, moles or lesions  EYES: NEGATIVE for vision changes or irritation  ENT: NEGATIVE for ear, mouth and throat problems  RESP: NEGATIVE for significant cough or SOB  CV: NEGATIVE for chest pain, palpitations or peripheral edema  GI: NEGATIVE for nausea, abdominal pain, heartburn, or change in bowel habits   male: negative for dysuria, hematuria, decreased urinary stream, erectile dysfunction, urethral discharge  MUSCULOSKELETAL:+significant arthralgias or myalgia in there knees and back   NEURO: NEGATIVE for weakness, dizziness or paresthesias  PSYCHIATRIC: NEGATIVE for changes in mood or affect    OBJECTIVE:   /74  Pulse 61  Temp 98.4  F (36.9  C) (Oral)  Resp 16  Ht 5' 7\" (1.702 m)  Wt 126 lb (57.2 kg)  SpO2 96%  BMI 19.73 kg/m2  EXAM:  GENERAL: frail , alert and no distress  EYES: Eyes grossly normal to inspection, PERRL and conjunctivae and sclerae normal  HENT: ear canals and TM's normal, nose and mouth without ulcers or lesions  NECK: no adenopathy, no asymmetry, masses, or scars and thyroid normal to palpation  RESP: lungs clear to auscultation - no rales, rhonchi or wheezes  CV: regular rate and rhythm, " normal S1 S2, no S3 or S4, no murmur, click or rub, no peripheral edema and peripheral pulses strong  ABDOMEN: soft, nontender, no hepatosplenomegaly, no masses and bowel sounds normal  MS: no gross musculoskeletal defects noted, no edema  SKIN: no suspicious lesions or rashes  NEURO: generalized weakness, knees with OA changes, LS spine paravertebral tenderness, mentation intact and speech normal  PSYCH: mentation appears normal, affect normal/bright    Diagnostic Test Results:  none     ASSESSMENT/PLAN:       ICD-10-CM    1. Encounter for preventative adult health care examination Z00.00    2. Bilateral low back pain without sciatica, unspecified chronicity M54.5 XR Lumbar Spine 2/3 Views   3. Special screening for malignant neoplasms, colon Z12.11 GASTROENTEROLOGY ADULT REF PROCEDURE ONLY Rosy Lenzge [phone]   4. Recurrent major depressive disorder, in partial remission (H) F33.41 sertraline (ZOLOFT) 100 MG tablet   5. Type 2 diabetes mellitus without complication, without long-term current use of insulin (H) E11.9 CBC with platelets     Comprehensive metabolic panel     Lipid panel reflex to direct LDL Non-fasting     TSH with free T4 reflex     Hemoglobin A1c     *UA reflex to Microscopic     Albumin Random Urine Quantitative with Creat Ratio   6. Hyperlipidemia LDL goal <160 E78.5 Lipid panel reflex to direct LDL Non-fasting     TSH with free T4 reflex   7. Screening for prostate cancer Z12.5 Prostate spec antigen screen   8. Primary osteoarthritis of both knees M17.0    9. Administrative encounter Z02.9        COUNSELING:  Reviewed preventive health counseling, as reflected in patient instructions       Regular exercise       Healthy diet/nutrition       Vision screening       Hearing screening       Colon cancer screening       Prostate cancer screening    BP Readings from Last 1 Encounters:   10/24/18 138/74     Estimated body mass index is 19.73 kg/(m^2) as calculated from the following:     "Height as of this encounter: 5' 7\" (1.702 m).    Weight as of this encounter: 126 lb (57.2 kg).      Weight management plan noted, stable and monitoring     reports that he quit smoking about 17 years ago. His smoking use included Cigarettes. He has never used smokeless tobacco.      Counseling Resources:  ATP IV Guidelines  Pooled Cohorts Equation Calculator  FRAX Risk Assessment  ICSI Preventive Guidelines  Dietary Guidelines for Americans, 2010  USDA's MyPlate  ASA Prophylaxis  Lung CA Screening    Ray Sun MD  [facility]  "

## 2018-10-24 NOTE — MR AVS SNAPSHOT
After Visit Summary   10/24/2018    Josh Gonzalez    MRN: [id number]           Patient Information     Date Of Birth          1945        Visit Information        Provider Department      10/24/2018 2:25 PM Ray Sun MD; [employer] [facility]        Today's Diagnoses     Type 2 diabetes mellitus without complication, without long-term current use of insulin (H)    -  1    Bilateral low back pain without sciatica, unspecified chronicity        Special screening for malignant neoplasms, colon        Recurrent major depressive disorder, in partial remission (H)        Hyperlipidemia LDL goal <160        Screening for prostate cancer           Follow-ups after your visit        Additional Services     GASTROENTEROLOGY ADULT REF PROCEDURE ONLY [facility]y [facility] [phone]       Last Lab Result: Creatinine (mg/dL)       Date                     Value                 08/01/2017               1.17             ----------  Body mass index is 19.73 kg/(m^2).     Needed:  Yes  Language:  Cantonese    Patient will be contacted to schedule procedure.     Please be aware that coverage of these services is subject to the terms and limitations of your health insurance plan.  Call member services at your health plan with any benefit or coverage questions.  Any procedures must be performed at a [facility] facility OR coordinated by your clinic's referral office.    Please bring the following with you to your appointment:    (1) Any X-Rays, CTs or MRIs which have been performed.  Contact the facility where they were done to arrange for  prior to your scheduled appointment.    (2) List of current medications   (3) This referral request   (4) Any documents/labs given to you for this referral                  Future tests that were ordered for you today     Open Future Orders        Priority Expected Expires Ordered    XR Lumbar Spine 2/3 Views Routine  "10/24/2018 10/24/2019 10/24/2018            Who to contact     If you have questions or need follow up information about today's clinic visit or your schedule please contact [facility] directly at [phone].  Normal or non-critical lab and imaging results will be communicated to you by MyChart, letter or phone within 4 business days after the clinic has received the results. If you do not hear from us within 7 days, please contact the clinic through MyChart or phone. If you have a critical or abnormal lab result, we will notify you by phone as soon as possible.  Submit refill requests through [employer] or call your pharmacy and they will forward the refill request to us. Please allow 3 business days for your refill to be completed.          Additional Information About Your Visit        Care EveryWhere ID     This is your Care EveryWhere ID. This could be used by other organizations to access your [facility] medical records  [id number]        Your Vitals Were     Pulse Temperature Respirations Height Pulse Oximetry BMI (Body Mass Index)    61 98.4  F (36.9  C) (Oral) 16 5' 7\" (1.702 m) 96% 19.73 kg/m2       Blood Pressure from Last 3 Encounters:   10/24/18 138/74   01/18/18 110/60   01/11/18 118/70    Weight from Last 3 Encounters:   10/24/18 126 lb (57.2 kg)   01/18/18 124 lb (56.2 kg)   01/11/18 124 lb 14.4 oz (56.7 kg)              We Performed the Following     *UA reflex to Microscopic     Albumin Random Urine Quantitative with Creat Ratio     CBC with platelets     Comprehensive metabolic panel     GASTROENTEROLOGY ADULT REF PROCEDURE ONLY [facility] [phone]     Hemoglobin A1c     Lipid panel reflex to direct LDL Non-fasting     Prostate spec antigen screen     TSH with free T4 reflex          Today's Medication Changes          These changes are accurate as of 10/24/18  3:14 PM.  If you have any questions, ask your nurse or doctor.               These medicines have changed or " have updated prescriptions.        Dose/Directions    sertraline 100 MG tablet   Commonly known as:  ZOLOFT   This may have changed:  See the new instructions.   Used for:  Recurrent major depressive disorder, in partial remission (H)   Changed by:  Ray Sun MD        Dose:  100 mg   Take 1 tablet (100 mg) by mouth daily   Quantity:  90 tablet   Refills:  3            Where to get your medicines      These medications were sent to [id number] - [place], [place] - [address]  [address], [facility] [address]     Phone:  [phone]     sertraline 100 MG tablet                Primary Care Provider Office Phone # Fax #    Ray Sun [phone]       [address]  [facility] [address]        Equal Access to Services     [facility]: Hadii marcus grimaldo hadasho Soale, waaxda luqadaha, qaybta kaalmada adeegyada, barry steve . So [facility] [phone].    ATENCIÓN: Si habla español, tiene a lilly disposición servicios gratuitos de asistencia lingüística. Llame al [phone].    We comply with applicable federal civil rights laws and [place] laws. We do not discriminate on the basis of race, color, national origin, age, disability, sex, sexual orientation, or gender identity.            Thank you!     Thank you for choosing [facility]  for your care. Our goal is always to provide you with excellent care. Hearing back from our patients is one way we can continue to improve our services. Please take a few minutes to complete the written survey that you may receive in the mail after your visit with us. Thank you!             Your Updated Medication List - Protect others around you: Learn how to safely use, store and throw away your medicines at www.disposemymeds.org.          This list is accurate as of 10/24/18  3:14 PM.  Always use your most recent med list.                   Brand Name Dispense Instructions for use  Diagnosis    acetaminophen 325 MG tablet    TYLENOL    100 tablet    Take 2 tablets (650 mg) by mouth every 6 hours as needed for mild pain    Primary osteoarthritis of both knees, Chronic pain of both shoulders       albuterol 108 (90 Base) MCG/ACT inhaler    PROAIR HFA/PROVENTIL HFA/VENTOLIN HFA    1 Inhaler    Inhale 2 puffs into the lungs every 6 hours as needed for shortness of breath / dyspnea or wheezing    Acute bronchitis, unspecified organism       calcium carbonate 500 mg-vitamin D 200 units 500-200 MG-UNIT per tablet    OSCAL with D;OYSTER SHELL CALCIUM     Take 2 tablets by mouth daily        cholecalciferol 1000 UNIT tablet    vitamin D3    90 tablet    TAKE 1 TABLET (1,000 UNITS) BY MOUTH DAILY    Primary osteoarthritis of both knees, Chronic pain of both shoulders       naproxen 500 MG tablet    NAPROSYN    30 tablet    Take 1 tablet (500 mg) by mouth 2 times daily as needed for moderate pain    Primary osteoarthritis of both knees, Chronic pain of both shoulders       order for DME     1 Device    Equipment being ordered: CPAP supplies (mask, tubing etc.)    Sleep apnea       sertraline 100 MG tablet    ZOLOFT    90 tablet    Take 1 tablet (100 mg) by mouth daily    Recurrent major depressive disorder, in partial remission (H)       tamsulosin 0.4 MG capsule    FLOMAX    90 capsule    Take 1 capsule (0.4 mg) by mouth daily    Elevated prostate specific antigen (PSA)

## 2018-10-24 NOTE — NURSING NOTE
"Chief Complaint   Patient presents with     Pain     pt c/o joint pain in lower ext and back onset for a while     initial /74  Pulse 61  Temp 98.4  F (36.9  C) (Oral)  Resp 16  Ht 5' 7\" (1.702 m)  Wt 126 lb (57.2 kg)  SpO2 96%  BMI 19.73 kg/m2 Estimated body mass index is 19.73 kg/(m^2) as calculated from the following:    Height as of this encounter: 5' 7\" (1.702 m).    Weight as of this encounter: 126 lb (57.2 kg)..  bp completed using cuff size regular  ROSALEE URRUTIA LPN  "

## 2018-10-25 LAB
ALBUMIN SERPL-MCNC: 3.5 G/DL (ref 3.4–5)
ALP SERPL-CCNC: 69 U/L (ref 40–150)
ALT SERPL W P-5'-P-CCNC: 26 U/L (ref 0–70)
ANION GAP SERPL CALCULATED.3IONS-SCNC: 7 MMOL/L (ref 3–14)
AST SERPL W P-5'-P-CCNC: 26 U/L (ref 0–45)
BILIRUB SERPL-MCNC: 0.3 MG/DL (ref 0.2–1.3)
BUN SERPL-MCNC: 17 MG/DL (ref 7–30)
CALCIUM SERPL-MCNC: 9.1 MG/DL (ref 8.5–10.1)
CHLORIDE SERPL-SCNC: 101 MMOL/L (ref 94–109)
CHOLEST SERPL-MCNC: 169 MG/DL
CO2 SERPL-SCNC: 29 MMOL/L (ref 20–32)
CREAT SERPL-MCNC: 1.2 MG/DL (ref 0.66–1.25)
CREAT UR-MCNC: 45 MG/DL
GFR SERPL CREATININE-BSD FRML MDRD: 59 ML/MIN/1.7M2
GLUCOSE SERPL-MCNC: 81 MG/DL (ref 70–99)
HDLC SERPL-MCNC: 45 MG/DL
LDLC SERPL CALC-MCNC: 65 MG/DL
MICROALBUMIN UR-MCNC: 19 MG/L
MICROALBUMIN/CREAT UR: 42.44 MG/G CR (ref 0–17)
NONHDLC SERPL-MCNC: 124 MG/DL
POTASSIUM SERPL-SCNC: 4 MMOL/L (ref 3.4–5.3)
PROT SERPL-MCNC: 7.6 G/DL (ref 6.8–8.8)
PSA SERPL-ACNC: 3.13 UG/L (ref 0–4)
SODIUM SERPL-SCNC: 137 MMOL/L (ref 133–144)
TRIGL SERPL-MCNC: 297 MG/DL
TSH SERPL DL<=0.005 MIU/L-ACNC: 1.32 MU/L (ref 0.4–4)

## 2018-10-25 ASSESSMENT — PATIENT HEALTH QUESTIONNAIRE - PHQ9: SUM OF ALL RESPONSES TO PHQ QUESTIONS 1-9: 13

## 2018-12-06 ENCOUNTER — HOSPITAL ENCOUNTER (OUTPATIENT)
Facility: CLINIC | Age: 73
Discharge: HOME | End: 2018-12-06
Attending: INTERNAL MEDICINE | Admitting: INTERNAL MEDICINE
Payer: COMMERCIAL

## 2018-12-06 VITALS
SYSTOLIC BLOOD PRESSURE: 114 MMHG | OXYGEN SATURATION: 98 % | RESPIRATION RATE: 16 BRPM | HEIGHT: 67 IN | DIASTOLIC BLOOD PRESSURE: 74 MMHG | WEIGHT: 125 LBS | BODY MASS INDEX: 19.62 KG/M2

## 2018-12-06 LAB
COLONOSCOPY: NORMAL
GLUCOSE BLDC GLUCOMTR-MCNC: 80 MG/DL (ref 70–99)

## 2018-12-06 PROCEDURE — 25000128 H RX IP 250 OP 636: Performed by: INTERNAL MEDICINE

## 2018-12-06 PROCEDURE — 82962 GLUCOSE BLOOD TEST: CPT

## 2018-12-06 PROCEDURE — 45378 DIAGNOSTIC COLONOSCOPY: CPT | Performed by: INTERNAL MEDICINE

## 2018-12-06 PROCEDURE — G0500 MOD SEDAT ENDO SERVICE >5YRS: HCPCS | Performed by: INTERNAL MEDICINE

## 2018-12-06 PROCEDURE — G0121 COLON CA SCRN NOT HI RSK IND: HCPCS | Performed by: INTERNAL MEDICINE

## 2018-12-06 RX ORDER — NALOXONE HYDROCHLORIDE 0.4 MG/ML
.1-.4 INJECTION, SOLUTION INTRAMUSCULAR; INTRAVENOUS; SUBCUTANEOUS
Status: DISCONTINUED | OUTPATIENT
Start: 2018-12-06 | End: 2018-12-06 | Stop reason: HOSPADM

## 2018-12-06 RX ORDER — FENTANYL CITRATE 50 UG/ML
INJECTION, SOLUTION INTRAMUSCULAR; INTRAVENOUS PRN
Status: DISCONTINUED | OUTPATIENT
Start: 2018-12-06 | End: 2018-12-06 | Stop reason: HOSPADM

## 2018-12-06 RX ORDER — ONDANSETRON 2 MG/ML
4 INJECTION INTRAMUSCULAR; INTRAVENOUS
Status: DISCONTINUED | OUTPATIENT
Start: 2018-12-06 | End: 2018-12-06 | Stop reason: HOSPADM

## 2018-12-06 RX ORDER — ONDANSETRON 4 MG/1
4 TABLET, ORALLY DISINTEGRATING ORAL EVERY 6 HOURS PRN
Status: DISCONTINUED | OUTPATIENT
Start: 2018-12-06 | End: 2018-12-06 | Stop reason: HOSPADM

## 2018-12-06 RX ORDER — FLUMAZENIL 0.1 MG/ML
0.2 INJECTION, SOLUTION INTRAVENOUS
Status: DISCONTINUED | OUTPATIENT
Start: 2018-12-06 | End: 2018-12-06 | Stop reason: HOSPADM

## 2018-12-06 RX ORDER — ONDANSETRON 2 MG/ML
4 INJECTION INTRAMUSCULAR; INTRAVENOUS EVERY 6 HOURS PRN
Status: DISCONTINUED | OUTPATIENT
Start: 2018-12-06 | End: 2018-12-06 | Stop reason: HOSPADM

## 2018-12-06 RX ORDER — LIDOCAINE 40 MG/G
CREAM TOPICAL
Status: DISCONTINUED | OUTPATIENT
Start: 2018-12-06 | End: 2018-12-06 | Stop reason: HOSPADM

## 2018-12-06 NOTE — H&P
Pre-Endoscopy History and Physical     Man Yamilet Gonzalez MRN# [id number]   YOB: 1945 Age: 73 year old     Date of Procedure: 12/6/2018  Primary care provider: Ray Sun  Type of Endoscopy: Colonoscopy with possible biopsy, possible polypectomy  Reason for Procedure: screen  Type of Anesthesia Anticipated: Conscious Sedation    HPI:    Man is a 73 year old male who will be undergoing the above procedure.      A history and physical has been performed. The patient's medications and allergies have been reviewed. The risks and benefits of the procedure and the sedation options and risks were discussed with the patient.  All questions were answered and informed consent was obtained.      He denies a personal or family history of anesthesia complications or bleeding disorders.     Patient Active Problem List   Diagnosis     Advanced directives, counseling/discussion     Hyperlipidemia LDL goal <160     Health Care Home     Major depression     KULWANT (obstructive sleep apnea)     Gait instability     Mixed incontinence     Type 2 diabetes mellitus without complication, without long-term current use of insulin (H)     Primary osteoarthritis of both knees        Past Medical History:   Diagnosis Date     Depression      Depressive disorder      Mixed incontinence 12/20/2016     Myalgia      KULWANT on CPAP      Postnasal drip      ROTATOR CUFF (CAPSULE) SPRAIN         Past Surgical History:   Procedure Laterality Date     COLONOSCOPY         Social History   Substance Use Topics     Smoking status: Former Smoker     Types: Cigarettes     Quit date: 9/12/2001     Smokeless tobacco: Never Used     Alcohol use No       Family History   Problem Relation Age of Onset     Unknown/Adopted Mother      Unknown/Adopted Father      Colon Cancer No family hx of        Prior to Admission medications    Medication Sig Start Date End Date Taking? Authorizing Provider   acetaminophen (TYLENOL) 325 MG tablet Take 2 tablets (650  "mg) by mouth every 6 hours as needed for mild pain 4/25/17  Yes Ray Sun MD   albuterol (PROAIR HFA/PROVENTIL HFA/VENTOLIN HFA) 108 (90 BASE) MCG/ACT Inhaler Inhale 2 puffs into the lungs every 6 hours as needed for shortness of breath / dyspnea or wheezing 1/11/18  Yes Frankie Garcia MD   calcium carbonate 500 mg-vitamin D 200 units (OSCAL WITH D;OYSTER SHELL CALCIUM) 500-200 MG-UNIT per tablet Take 2 tablets by mouth daily   Yes Reported, Patient   naproxen (NAPROSYN) 500 MG tablet Take 1 tablet (500 mg) by mouth 2 times daily as needed for moderate pain 8/1/17  Yes Ray Sun MD   sertraline (ZOLOFT) 100 MG tablet Take 1 tablet (100 mg) by mouth daily 10/24/18  Yes Ray Sun MD   tamsulosin (FLOMAX) 0.4 MG capsule Take 1 capsule (0.4 mg) by mouth daily 1/18/18  Yes Wilman Chacko MD   VITAMIN D3 1000 units tablet TAKE 1 TABLET (1,000 UNITS) BY MOUTH DAILY 6/14/18  Yes Ray Sun MD   ORDER FOR DME Equipment being ordered: CPAP supplies (mask, tubing etc.) 11/22/13   Ray Sun MD       No Known Allergies     REVIEW OF SYSTEMS:   5 point ROS negative except as noted above in HPI, including Gen., Resp., CV, GI &  system review.    PHYSICAL EXAM:   Ht 1.702 m (5' 7\")  Wt 56.7 kg (125 lb)  BMI 19.58 kg/m2 Estimated body mass index is 19.58 kg/(m^2) as calculated from the following:    Height as of this encounter: 1.702 m (5' 7\").    Weight as of this encounter: 56.7 kg (125 lb).   GENERAL APPEARANCE: alert, and oriented  MENTAL STATUS: alert  AIRWAY EXAM: Mallampatti Class I (visualization of the soft palate, fauces, uvula, anterior and posterior pillars)  RESP: lungs clear to auscultation - no rales, rhonchi or wheezes  CV: regular rates and rhythm  DIAGNOSTICS:    Not indicated    IMPRESSION   ASA Class 2 - Mild systemic disease    PLAN:   Plan for Colonoscopy with possible biopsy, possible polypectomy. We discussed the risks, benefits and " alternatives and the patient wished to proceed.    The above has been forwarded to the consulting provider.      Signed Electronically by: Brent Jeffries  December 6, 2018

## 2018-12-06 NOTE — IP AVS SNAPSHOT
"                  MRN:[id number]                      After Visit Summary   12/6/2018    Josh Gonzalez    MRN: [id number]           Thank you!     Thank you for choosing [facility] for your care. Our goal is always to provide you with excellent care. Hearing back from our patients is one way we can continue to improve our services. Please take a few minutes to complete the written survey that you may receive in the mail after you visit. If you would like to speak to someone directly about your visit please contact Patient Relations at [phone]. Thank you!          Patient Information     Date Of Birth          1945        About your hospital stay     You were admitted on:  December 6, 2018 You last received care in the:  [facility]    You were discharged on:  December 6, 2018       Who to Call     For medical emergencies, please call 911.  For non-urgent questions about your medical care, please call your primary care provider or clinic, [phone]  For questions related to your surgery, please call your surgery clinic        Attending Provider     Provider Specialty    Brent Jeffries MD Gastroenterology       Primary Care Provider Office Phone # Fax #    Ray Sun [phone]      Pending Results     No orders found from 12/4/2018 to 12/7/2018.            Admission Information     Date & Time Provider Department Dept. Phone    12/6/2018 Brent Jeffries MD [facility] [phone]      Your Vitals Were     Blood Pressure Respirations Height Weight Pulse Oximetry BMI (Body Mass Index)    118/66 16 1.702 m (5' 7\") 56.7 kg (125 lb) 95% 19.58 kg/m2      Care EveryWhere ID     This is your Care EveryWhere ID. This could be used by other organizations to access your [facility] medical records  [id number]        Equal Access to Services     AMARILYS SOLOMON AH: jordi Jiang, barry eckert " tony galeanada carlos'aan ah. So [facility] [phone].    ATENCIÓN: Si saturninola gordo, tiene a lilly disposición servicios gratuitos de asistencia lingüística. Angelo al [phone].    We comply with applicable federal civil rights laws and [place] laws. We do not discriminate on the basis of race, color, national origin, age, disability, sex, sexual orientation, or gender identity.               Review of your medicines      CONTINUE these medicines which have NOT CHANGED        Dose / Directions    acetaminophen 325 MG tablet   Commonly known as:  TYLENOL   Used for:  Primary osteoarthritis of both knees, Chronic pain of both shoulders        Dose:  650 mg   Take 2 tablets (650 mg) by mouth every 6 hours as needed for mild pain   Quantity:  100 tablet   Refills:  3       albuterol 108 (90 Base) MCG/ACT inhaler   Commonly known as:  PROAIR HFA/PROVENTIL HFA/VENTOLIN HFA   Used for:  Acute bronchitis, unspecified organism        Dose:  2 puff   Inhale 2 puffs into the lungs every 6 hours as needed for shortness of breath / dyspnea or wheezing   Quantity:  1 Inhaler   Refills:  0       calcium carbonate-vitamin D 500-200 MG-UNIT tablet   Commonly known as:  OSCAL w/D        Dose:  2 tablet   Take 2 tablets by mouth daily   Refills:  0       naproxen 500 MG tablet   Commonly known as:  NAPROSYN   Used for:  Primary osteoarthritis of both knees, Chronic pain of both shoulders        Dose:  500 mg   Take 1 tablet (500 mg) by mouth 2 times daily as needed for moderate pain   Quantity:  30 tablet   Refills:  3       order for DME   Used for:  Sleep apnea        Equipment being ordered: CPAP supplies (mask, tubing etc.)   Quantity:  1 Device   Refills:  5       sertraline 100 MG tablet   Commonly known as:  ZOLOFT   Used for:  Recurrent major depressive disorder, in partial remission (H)        Dose:  100 mg   Take 1 tablet (100 mg) by mouth daily   Quantity:  90 tablet   Refills:  3       tamsulosin 0.4 MG capsule    Commonly known as:  FLOMAX   Used for:  Elevated prostate specific antigen (PSA)        Dose:  0.4 mg   Take 1 capsule (0.4 mg) by mouth daily   Quantity:  90 capsule   Refills:  3       vitamin D3 1000 units (25 mcg) tablet   Commonly known as:  CHOLECALCIFEROL   Used for:  Primary osteoarthritis of both knees, Chronic pain of both shoulders        TAKE 1 TABLET (1,000 UNITS) BY MOUTH DAILY   Quantity:  90 tablet   Refills:  1                Protect others around you: Learn how to safely use, store and throw away your medicines at www.disposemymeds.org.             Medication List: This is a list of all your medications and when to take them. Check marks below indicate your daily home schedule. Keep this list as a reference.      Medications           Morning Afternoon Evening Bedtime As Needed    acetaminophen 325 MG tablet   Commonly known as:  TYLENOL   Take 2 tablets (650 mg) by mouth every 6 hours as needed for mild pain                                albuterol 108 (90 Base) MCG/ACT inhaler   Commonly known as:  PROAIR HFA/PROVENTIL HFA/VENTOLIN HFA   Inhale 2 puffs into the lungs every 6 hours as needed for shortness of breath / dyspnea or wheezing                                calcium carbonate-vitamin D 500-200 MG-UNIT tablet   Commonly known as:  OSCAL w/D   Take 2 tablets by mouth daily                                naproxen 500 MG tablet   Commonly known as:  NAPROSYN   Take 1 tablet (500 mg) by mouth 2 times daily as needed for moderate pain                                order for DME   Equipment being ordered: CPAP supplies (mask, tubing etc.)                                sertraline 100 MG tablet   Commonly known as:  ZOLOFT   Take 1 tablet (100 mg) by mouth daily                                tamsulosin 0.4 MG capsule   Commonly known as:  FLOMAX   Take 1 capsule (0.4 mg) by mouth daily                                vitamin D3 1000 units (25 mcg) tablet   Commonly known as:  CHOLECALCIFEROL    TAKE 1 TABLET (1,000 UNITS) BY MOUTH DAILY                                          More Information        High-Fiber Diet  Fiber is in fruits, vegetables, cereals, and grains. Fiber passes through your body undigested. A high-fiber diet helps food move through your intestinal tract. The added bulk is helpful in preventing constipation. In people with diverticulosis, fiber helps clean out the pouches along the colon wall. It also prevents new pouches from forming. A high-fiber diet reduces the risk of colon cancer. It also lowers blood cholesterol and prevents high blood sugar in people with diabetes.    The fiber-rich foods listed below should be part of your diet. If you are not used to high-fiber foods, start with 1 or 2 foods from this list. Every 3 to 4 days add a new one to your diet. Do this until you are eating 4 high-fiber foods per day. This should give you 20 to 35 grams of fiber a day. It is also important to drink a lot of water when you are on this diet. You should have 6 to 8 glasses of water a day. Water makes the fiber swell and increases the benefit.  Foods high in dietary fiber  The following foods are high in dietary fiber:    Breads. Breads made with 100% whole-wheat flour; pawan, wheat, or rye crackers; whole-grain tortillas, bran muffins.    Cereals. Whole-grain and bran cereals with bran (shredded wheat, wheat flakes, raisin bran, corn bran); oatmeal, rolled oats, granola, and brown rice.    Fruits. Fresh fruits and their edible skins (pears, prunes, raisins, berries, apples, and apricots); bananas, citrus fruit, mangoes, pineapple; and prune juice.    Nuts. Any nuts and seeds.    Vegetables. Best served raw or lightly cooked. All types, especially: green peas, celery, eggplant, potatoes, spinach, broccoli, [place] sprouts, winter squash, carrots, cauliflower, soybeans, lentils, and fresh and dried beans of all kinds.    Other. Popcorn, any spices.  Date Last Reviewed: 8/1/2016     6741-8681 [employer]. [address] [place] [address], [place] [address]. All rights reserved. This information is not intended as a substitute for professional medical care. Always follow your healthcare professional's instructions.

## 2018-12-06 NOTE — LETTER
November 19, 2018      Josh Woodard Lisa  [id number] [facility] [id number]        Dear Josh Woodard,      Thank you for choosing [facility]. You are scheduled for the following service.    Date:   12/06/2018 Thursday     Procedure: COLONOSCOPY   Doctor:  Dr. Brent Jeffries          Arrival Time:  8:00 AM    *check in at Emergency/Endoscopy desk*  Procedure Time:  8:30 AM    Location:   [facility]        Endoscopy Department, First Floor (Enter through ER Doors) *        [address] [place], [place] [address]      [phone] or [phone] () to reschedule       MIRALAX -GATORADE  DOUBLE PREP  (without magnesium citrate)  Colonoscopy is the most accurate test to detect colon polyps and colon cancer; and the only test where polyps can be removed. During this procedure, a doctor examines the lining of your large intestine and rectum through a flexible tube.  Transportation  Arrange for a ride for the day of your procedure with a responsible adult.  A taxi ride is not an option unless you are accompanied by a responsible adult. If you fail to arrange transportation with a responsible adult, your procedure will be cancelled and reschedule    Purchase the  following supplies at your local pharmacy:  - 2 (two) bisacodyl tablets: each tablet contains 5 mg.  (Dulcolax  laxative NOT Dulcolax  stool softener)   - 2 (two) 8.3 oz bottles of Polyethylene Glycol (PEG) 3350 Powder   (MiraLAX , Smooth LAX , ClearLAX  or equivalent)  - 128 oz Gatorade    Regular Gatorade , Gatorade G2 , Powerade , Powerade Zero  or Pedialyte  is acceptable. Red colored flavors are not allowed; all other colors (yellow, green, orange, purple and blue) are okay. It is also okay to buy four 2.12 oz packets of powdered Gatorade that can be mixed with water to a total volume of 128 oz of liquid.      PREPARATION FOR COLONOSCOPY    7 days before:    Discontinue fiber supplements and  medications containing iron. This includes Metamucil  and Fibercon ; and multivitamins with iron.  3 days before:     Begin a low-fiber diet. A low-fiber diet helps make the cleanout more effective.     Examples of a low-fiber diet include (but are not limited to): white bread, white rice, pasta, crackers, fish, chicken, eggs, ground beef, creamy peanut butter, cooked/steamed/boiled vegetables, canned fruit, bananas, melons, milk, plain yogurt cheese, salad dressing and other condiments.     The following are not allowed on a low-fiber diet: seeds, nuts, popcorn, bran, whole wheat, corn, quinoa, raw fruits and vegetables, berries and dried fruit, beans and lentils.    For additional details on low-fiber diet, please refer to the table on the last page.  2 days before:    Stop eating solid foods in the morning.    Begin a clear liquid diet (liquids you can see through).     Examples of a clear liquid diet include: water, tea, clear broth or bouillon, Gatorade, Pedialyte or Powerade, carbonated and non-carbonated soft drinks (Sprite , 7-Up , ginger ale), strained fruit juices without pulp (apple, white grape, white cranberry), Jell-O  and popsicles.     The following are not allowed on a clear liquid diet: red liquids, alcoholic beverages, coffee, dairy products (milk, creamer and yogurt), protein shakes, creamy broths, juice with pulp and chewing tobacco.    At 4 pm (and no later than 6pm): start drinking the Miralax-Gatorade preparation (8.3 oz of Miralax mixed with 64 oz of Gatorade in a large pitcher). Drink 1 (one) 8 oz glass every 15 minutes thereafter, until the mixture is gone.  1 day before:    Continue the clear liquid diet.    At noon: take 2 (two) bisacodyl tablets.     At 4 pm (and no later than 6pm): start drinking the Miralax-Gatorade preparation (8.3 oz of Miralax mixed with 64 oz of Gatorade in a large pitcher). Drink 1 (one) 8 oz glass every 15 minutes thereafter, until the mixture is gone.    COLON  CLEANSING TIPS: drink adequate amounts of fluids before and after your colon cleansing to prevent dehydration. Stay near a toilet because you will have diarrhea. Even if you are sitting on the toilet, continue to drink the cleansing solution every 15 minutes. If you feel nauseous or vomit, rinse your mouth with water, take a 15 to 30-minute-break and then continue drinking the solution. You will be uncomfortable until the stool has flushed from your colon (in about 2 to 4 hours). You may feel chilled.    Day of your procedure  You may take all of your morning medications including blood pressure medications, blood thinners (if you have not been instructed to stop these by our office), methadone, anti-seizure medications with sips of water 3 hours prior to your procedure or earlier. Do not take insulin or vitamins prior to your procedure. Continue the clear liquid diet.   4 hours prior:     STOP consuming all liquids.     Do not take anything by mouth during this time.     Allow extra time to travel to your procedure as you may need to stop and use a restroom along the way.  You are ready for the procedure, if you followed all instructions and your stool is no longer formed, but clear or yellow liquid. If you are unsure whether your colon is clean, please call our office at [phone] before you leave for your appointment.    Bring the following to your procedure:  - Insurance Card/Photo ID.   - List of current medications including over-the-counter medications and supplements.   - Your rescue inhaler if you currently use one to control asthma.     Canceling or rescheduling your appointment:  If you must cancel or reschedule your appointment, please call [phone] as soon as possible.    COLONOSCOPY PRE-PROCEDURE CHECKLIST  If you have diabetes, ask your regular doctor for diet and medication restrictions.  If you take an anticoagulant or anti-platelet medication (such as Coumadin , Lovenox , Pradaxa , Xarelto ,  Eliquis , etc.), please call your primary doctor for advice on holding this medication.  If you take aspirin you may continue to do so.  If you are or may be pregnant, please discuss the risks and benefits of this procedure with your doctor.    What happens during a colonoscopy?  Plan to spend up to two hours, starting at registration time, at the endoscopy center the day of your procedure. The colonoscopy takes an average of 15 to 30 minutes. Recovery time is about 30 minutes.    Before the exam:    You will change into a gown.    Your medical history and medication list will be reviewed with you, unless that has been done over the phone prior to the procedure.     A nurse will insert an intravenous (IV) line into your hand or arm.    The doctor will meet with you and will give you a consent form to sign.    During the exam:     Medicine will be given through the IV line to help you relax.     Your heart rate and oxygen levels will be monitored. If your blood pressure is low, you may be given fluids through the IV line.     The doctor will insert a flexible hollow tube, called a colonoscope, into your rectum. The scope will be advanced slowly through the large intestine (colon).    You may have a feeling of fullness or pressure.     If an abnormal tissue or a polyp is found, the doctor may remove it through the endoscope for closer examination, or biopsy. Tissue removal is painless.    After the exam:           Any tissue samples removed during the exam will be sent to a lab for evaluation. It may take 5-7 working days for you to be notified of the results.     A nurse will provide you with complete discharge instructions before you leave the endoscopy center. Be sure to ask the nurse for specific instructions if you take blood thinners such as Aspirin, Coumadin or Plavix.     The doctor will prepare a full report for you and for the physician who referred you for the procedure.     Your doctor will talk with you  about the initial results of your exam.      Medication given during the exam will prohibit you from driving for the rest of the day.     Following the exam, you may resume your normal diet. Your first meal should be light, no greasy foods. Avoid alcohol until the next day.     You may resume your regular activities the day after the procedure.     LOW-FIBER DIET  Foods RECOMMENDED Foods to AVOID   Breads, Cereal, Rice and Pasta:   White bread, rolls, biscuits, croissant and jolene toast.   Waffles, Citizen of [place] toast and pancakes.   White rice, noodles, pasta, macaroni and peeled cooked potatoes.   Plain crackers and saltines.   Cooked cereals: farina, cream of rice.   Cold cereals: Puffed Rice , Rice Krispies , Corn Flakes  and Special K    Breads, Cereal, Rice and Pasta:   Breads or rolls with nuts, seeds or fruit.   Whole wheat, pumpernickel, rye breads and cornbread.   Potatoes with skin, brown or wild rice, and kasha (buckwheat).     Vegetables:   Tender cooked and canned vegetables without seeds: carrots, asparagus tips, green or wax beans, pumpkin, spinach, lima beans. Vegetables:   Raw or steamed vegetables.   Vegetables with seeds.   Sauerkraut.   Winter squash, peas, broccoli, Brussel sprouts, cabbage, onions, cauliflower, baked beans, peas and corn.   Fruits:   Strained fruit juice.   Canned fruit, except pineapple.   Ripe bananas and melon. Fruits:   Prunes and prune juice.   Raw fruits.   Dried fruits: figs, dates and raisins.   Milk/Dairy:   Milk: plain or flavored.   Yogurt, custard and ice cream.   Cheese and cottage cheese Milk/Dairy:     Meat and other proteins:   ground, well-cooked tender beef, lamb, ham, veal, pork, fish, poultry and organ meats.   Eggs.   Peanut butter without nuts. Meat and other proteins:   Tough, fibrous meats with gristle.   Dry beans, peas and lentils.   Peanut butter with nuts.   Tofu.   Fats, Snack, Sweets, Condiments and Beverages:   Margarine, butter, oils, mayonnaise, sour  cream and salad dressing, plain gravy.   Sugar, hard candy, clear jelly, honey and syrup.   Spices, cooked herbs, bouillon, broth and soups made with allowed vegetable, ketchup and mustard.   Coffee, tea and carbonated drinks.   Plain cakes, cookies and pretzels.   Gelatin, plain puddings, custard, ice cream, sherbet and popsicles. Fats, Snack, Sweets, Condiments and Beverages:   Nuts, seeds and coconut.   Jam, marmalade and preserves.   Pickles, olives, relish and horseradish.   All desserts containing nuts, seeds, dried fruit and coconut; or made from whole grains or bran.   Candy made with nuts or seeds.   Popcorn.       DIRECTIONS TO THE ENDOSCOPY DEPARTMENT    From the north ([facility])  Take [address] South, exit on [address] . Get into the left hand jolly, turn left (east), go one-half mile to [address] and turn left. Go north to the first stoplight, take a right on [address] and follow it to the Emergency entrance.    From the south ([facility])  Take 35N to the 35E split and exit on [address] . On [address] , turn left (west) to [address]. Turn right (north) on [address]. Go north to the first stoplight, take a right on [address] and follow it to the Emergency entrance.    From the east via 35E ([facility])  Take [address]E south to [place] [address] exit. Turn right on [address] . Go west to [address]. Turn right (north) on [address]. Go to the first stoplight, take a right and follow on [address] to the Emergency entrance.    From the east via [address] ([facility])  Take [address] West to [address]. Turn left (south) on [address] to [address]. Turn left (east) on [address] and follow it to the Emergency entrance.    From the west via [address] ([place])  Take [address] east to [address]. Turn right (south) on [address] to [address]. Turn left (east) on [address] and follow it to the Emergency entrance.

## 2019-01-30 ENCOUNTER — PATIENT OUTREACH (OUTPATIENT)
Dept: GERIATRIC MEDICINE | Facility: CLINIC | Age: 74
End: 2019-01-30

## 2019-04-18 ENCOUNTER — OFFICE VISIT (OUTPATIENT)
Dept: FAMILY MEDICINE | Facility: CLINIC | Age: 74
End: 2019-04-18
Payer: COMMERCIAL

## 2019-04-18 VITALS
WEIGHT: 124 LBS | TEMPERATURE: 98 F | SYSTOLIC BLOOD PRESSURE: 146 MMHG | RESPIRATION RATE: 12 BRPM | OXYGEN SATURATION: 97 % | BODY MASS INDEX: 19.46 KG/M2 | HEART RATE: 64 BPM | DIASTOLIC BLOOD PRESSURE: 62 MMHG | HEIGHT: 67 IN

## 2019-04-18 DIAGNOSIS — Z23 NEED FOR IMMUNIZATION AGAINST TYPHOID: ICD-10-CM

## 2019-04-18 DIAGNOSIS — Z71.84 ENCOUNTER FOR COUNSELING FOR TRAVEL: Primary | ICD-10-CM

## 2019-04-18 DIAGNOSIS — Z23 NEED FOR HEPATITIS A VACCINATION: ICD-10-CM

## 2019-04-18 PROCEDURE — 99401 PREV MED CNSL INDIV APPRX 15: CPT | Mod: 25 | Performed by: PHYSICIAN ASSISTANT

## 2019-04-18 PROCEDURE — 90471 IMMUNIZATION ADMIN: CPT | Performed by: PHYSICIAN ASSISTANT

## 2019-04-18 PROCEDURE — 90632 HEPA VACCINE ADULT IM: CPT | Performed by: PHYSICIAN ASSISTANT

## 2019-04-18 PROCEDURE — 90472 IMMUNIZATION ADMIN EACH ADD: CPT | Performed by: PHYSICIAN ASSISTANT

## 2019-04-18 PROCEDURE — 90691 TYPHOID VACCINE IM: CPT | Performed by: PHYSICIAN ASSISTANT

## 2019-04-18 RX ORDER — AZITHROMYCIN 500 MG/1
500 TABLET, FILM COATED ORAL DAILY
Qty: 6 TABLET | Refills: 0 | Status: SHIPPED | OUTPATIENT
Start: 2019-04-18 | End: 2019-04-21

## 2019-04-18 ASSESSMENT — MIFFLIN-ST. JEOR: SCORE: 1261.09

## 2019-04-18 NOTE — PATIENT INSTRUCTIONS
See travel packet provided  Recommend insect repellant, pepto bismol and imodium  Also bring hand  and sun screen with you.  Safe Travels     Today April 18, 2019 you received the    Hepatitis A Vaccine - Please return on 10/18/19 or later for your 2nd and final dose.    Typhoid - injectable. This vaccine is valid for two years.   .    These appointments can be made as a NURSE ONLY visit.    **It is very important for the vaccinations to be given on the scheduled day(s), this helps ensure you receive the full effectiveness of the vaccine.**    Please call [facility] with any questions [phone]    Thank you for visiting [facility]

## 2019-04-18 NOTE — PROGRESS NOTES
SUBJECTIVE: Man Yamilet Gonzalez , a 73 year old  male, presents for counseling and information regarding upcoming travel to [place]. Special medical concerns include: none. He anticipates the following unusual exposures: none.    Itinerary:  [place]    Departure Date: 6/10/19 Return date: 7/3/19    Reason for travel (i.e. Business, pleasure): pleasure visiting relatives    Visiting an urban or rural area?: both    Accommodations (i.e. hotel, hostel, friends, family, etc): hotel, own house in [place]    Women - First day of your last period: na    IMMUNIZATION HISTORY  Have you received any vaccinations in the past 4 weeks?  No  Have you ever fainted from having your blood drawn or from an injection?  No  Have you ever had a fever reaction to vaccination?  No  Have you ever had any bad reaction or side effect from any vaccination?  No  Have you ever had hepatitis A or B vaccine?  yes  Do you live (or work closely) with anyone who has AIDS, an AIDS-like condition, any other immune disorder or who is on chemotherapy for cancer?  No  Have you received any injection of immune globulin or any blood products during the past 12 months?  No    GENERAL MEDICAL HISTORY  Do you have a medical condition that warrants maintenance medication or physician follow-up?  yes  Do you have a medical condition that is stable now, but that may recur while traveling?  No  Has your spleen been removed?  No  Have you had an acute illness or a fever in the past 48 hours?  No  Are you pregnant, or might you become pregnant on this trip?  Any chance of pregnancy?  No  Are you breastfeeding?  No  Do you have HIV, AIDS, an AIDS-like condition, any other immune disorder, leukemia or cancer?  No  Do you have a severe combined immunodeficiency disease?  No  Have you had your thymus gland removed or history of problems with your thymus, such as myasthenia gravis, DiGeorge syndrome, or thymoma?  No    Do you have severe thrombocytopenia (low platelet count) or a  coagulation disorder?  No  Have you ever had a convulsion, seizure, epilepsy, neurologic condition or brain infection?  No  Do you have any stomach conditions?  No  Do you have a G6PD deficiency?  No  Do you have severe renal or kidney impairment?  No  Do you have a history of psychiatric problems?  No  Do you have a problem with strange dreams and/or nightmares?  No  Do you have insomnia?  No  Do you have problems with vaginitis?  No  Do you have psoriasis?  No  Are you prone to motion sickness?  No  Have you ever had headaches, nausea, vomiting, or breathing problems from altitude exposure?  No      Past Medical History:   Diagnosis Date     Depression      Depressive disorder      Mixed incontinence 12/20/2016     Myalgia      KULWANT on CPAP      Postnasal drip      ROTATOR CUFF (CAPSULE) SPRAIN       Immunization History   Administered Date(s) Administered     Influenza (High Dose) 3 valent vaccine 11/29/2011, 10/17/2014, 01/10/2018     Pneumo Conj 13-V (2010&after) 08/04/2015     Pneumococcal 23 valent 11/29/2011     TDAP Vaccine (Adacel) 01/11/2012       Current Outpatient Medications   Medication Sig Dispense Refill     acetaminophen (TYLENOL) 325 MG tablet Take 2 tablets (650 mg) by mouth every 6 hours as needed for mild pain 100 tablet 3     albuterol (PROAIR HFA/PROVENTIL HFA/VENTOLIN HFA) 108 (90 BASE) MCG/ACT Inhaler Inhale 2 puffs into the lungs every 6 hours as needed for shortness of breath / dyspnea or wheezing 1 Inhaler 0     calcium carbonate 500 mg-vitamin D 200 units (OSCAL WITH D;OYSTER SHELL CALCIUM) 500-200 MG-UNIT per tablet Take 2 tablets by mouth daily       naproxen (NAPROSYN) 500 MG tablet Take 1 tablet (500 mg) by mouth 2 times daily as needed for moderate pain 30 tablet 3     ORDER FOR DME Equipment being ordered: CPAP supplies (mask, tubing etc.) 1 Device 5     sertraline (ZOLOFT) 100 MG tablet Take 1 tablet (100 mg) by mouth daily 90 tablet 3     tamsulosin (FLOMAX) 0.4 MG capsule Take 1  capsule (0.4 mg) by mouth daily 90 capsule 3     VITAMIN D3 1000 units tablet TAKE 1 TABLET (1,000 UNITS) BY MOUTH DAILY 90 tablet 1     No Known Allergies     EXAM: deferred    Immunizations discussed include: Hepatitis A and Typhoid  Malaraia prophylaxis recommended: not needed  Symptomatic treatment for traveler's diarrhea: bismuth subsalicylate, loperamide/diphenoxylate and azithromycin    ASSESSMENT/PLAN:    (Z71.89) Encounter for counseling for travel  (primary encounter diagnosis)    Comment: Hepatitis A and typhoid vaccines today. Patient will return or follow-up with PCP in 6 months for Hep A booster. Prophylaxis given for Traveler's diarrhea and is not needed for Malaria. All questions were answered.     Plan: azithromycin (ZITHROMAX) 500 MG tablet            (Z23) Need for hepatitis A vaccination  Comment:   Plan: HEPA VACCINE ADULT IM            (Z23) Need for immunization against typhoid  Comment:   Plan: TYPHOID VACCINE, IM              I have reviewed general recommendations for safe travel   including: food/water precautions, insect avoidance, safe sex   practices given high prevalence of HIV and other STDs,   roadway safety. Educational materials and links to the CDC   Traveler's health website have been provided.    Total time 20 minutes, greater than 50 percent in counseling   and coordination of care.

## 2019-06-03 ENCOUNTER — PATIENT OUTREACH (OUTPATIENT)
Dept: GERIATRIC MEDICINE | Facility: CLINIC | Age: 74
End: 2019-06-03

## 2019-06-03 NOTE — PROGRESS NOTES
[facility] Care Coordination Contact    Received VM message from dtr Yogi stating that client is going to [place] from June 10th until July 4th.  His Grandson who is his PCA is accompanying him.  Yogi asked if he could provide PCA services while gone.  LM for [employer] telling her that PCA cannot be billed while out of country.  Called [facility] and LM informing them of travel plans.  VIRGINIA Durbin, [facility] Care Coordinator  Tel [phone]  Fax [phone]

## 2019-07-16 ENCOUNTER — TELEPHONE (OUTPATIENT)
Dept: INTERNAL MEDICINE | Facility: CLINIC | Age: 74
End: 2019-07-16

## 2019-07-16 NOTE — TELEPHONE ENCOUNTER
Fax received from [facility] for review and signature.  Put in Dr. Enamorado's yellow folder in Dr. Sun's absence.

## 2019-07-31 ENCOUNTER — PATIENT OUTREACH (OUTPATIENT)
Dept: GERIATRIC MEDICINE | Facility: CLINIC | Age: 74
End: 2019-07-31

## 2019-07-31 NOTE — PROGRESS NOTES
[facility] Care Coordination Contact    Called  Sruthi to schedule annual HRA home visit. HRA has been scheduled for August 15th at 10 am.  Sruthi will call member to confirm.  Care Coordinator called [facility] to put on Sruthi's schedule.  VIRGINIA Durbin, [facility] Care Coordinator  Tel [phone]  Fax [phone]

## 2019-08-08 ENCOUNTER — TELEPHONE (OUTPATIENT)
Dept: INTERNAL MEDICINE | Facility: CLINIC | Age: 74
End: 2019-08-08

## 2019-08-15 ENCOUNTER — PATIENT OUTREACH (OUTPATIENT)
Dept: GERIATRIC MEDICINE | Facility: CLINIC | Age: 74
End: 2019-08-15

## 2019-08-15 ASSESSMENT — ACTIVITIES OF DAILY LIVING (ADL): DEPENDENT_IADLS:: CLEANING;COOKING;LAUNDRY;SHOPPING;MEAL PREPARATION;MONEY MANAGEMENT;TRANSPORTATION

## 2019-08-15 NOTE — PROGRESS NOTES
[facility] Care Coordination Contact    [facility] Home Visit Assessment     Home visit for Health Risk Assessment with Josh Gonzalez completed on August 15, 2019    Type of residence:: Private home - stairs  Current living arrangement:: I live in a private home with family     Assessment completed with:: Patient, Spouse or significant other, Other()    Current Care Plan  Member currently receiving the following home care services:   none  Member currently receiving the following community resources: PCA, Transportation Services, Day Care      Medication Review  Medication reconciliation completed in Epic: Yes  Medication set-up & administration: Independent-does not set up.  Family gives reminders.  Medication Risk Assessment Medication (1 or more, place referral to MTM): N/A: No risk factors identified  MTM Referral Placed: No: No risk factors idenified    Mental/Behavioral Health   Depression Screening: See PHQ assessment flowsheet.   Mental health DX:: Yes   Mental health DX how managed:: Medication  Mental Health Diagnosis: Yes: Major Depression  Mental Health Services: None: No further intervention needed at this time.    Falls Assessment:   Fallen 2 or more times in the past year?: No   Any fall with injury in the past year?: No    ADL/IADL Dependencies:   Dependent ADLs:: Ambulation-cane, Bathing, Dressing, Grooming, Transfers  Dependent IADLs:: Cleaning, Cooking, Laundry, Shopping, Meal Preparation, Money Management, Transportation    [facility] sponsored benefits: Shared information re: Silver Sneakers/gym memberships, ASA, Calcium +D.    PCA Assessment completed at visit: Yes     Elderly Waiver Eligibility: Yes-will continue on EW    Care Plan & Recommendations: Continue PCA and Adult Day Services.  Changed Ensure to high protein with ActivStyle.    See [facility] for detailed assessment information.    Follow-Up Plan: Member informed of future contact, plan to f/u with member with  a 6 month telephone assessment.  Contact information shared with member and family, encouraged member to call with any questions or concerns at any time.    [facility] continuum providers: Please refer to Health Care Home on the Epic Problem List to view this patient's [facility] Care Plan Summary.  VIRGINIA Durbin, [facility] Care Coordinator  Tel [phone]  Fax [phone]

## 2019-08-20 ENCOUNTER — PATIENT OUTREACH (OUTPATIENT)
Dept: GERIATRIC MEDICINE | Facility: CLINIC | Age: 74
End: 2019-08-20

## 2019-08-20 NOTE — LETTER
August 20, 2019    Important [employer] Information    MAN PIU SG  [address] [facility] [address]  Your Care Plan  Dear Man,  When we spoke recently, I promised to send you a Care Plan. The plan enclosed is a summary of our discussion. It includes the steps we agreed would help you meet your health goals. In addition, I can help you with:  Gom[employer]VqzsPD  This program is available to members who need a ride to medical and dental visits. To schedule a ride, call [phone] or [phone] (toll free). TTY/TTD: 711. You can call 8 a.m. to 8 p.m. Seven days a week. Access to a representative may be limited at times.    [employer]   The [employer] program empowers you to improve your health through education and exercise. To learn more, visit "[employer] or call [employer] at [phone] (toll free) (TTY:711) from 7 a.m. - 7 p.m. Central Time, Monday-Friday.  Health Care Directive   This form helps you outline your health care wishes. You can request a form from me and I will answer any questions you have before you discuss it with your doctor.   Annual Physical  Take a key step on your path to good health and set up an annual physical at your clinic.  Questions?  Call me at [phone] Monday-Friday between 8am and 5pm.  TTY/TTD: 711. As we discussed, I plan to be in touch with you again in 6 months to follow up via phone.  Sincerely,    VIRGINIA Durbin, [facility]    E-mail: [email or web address]  Phone:[phone]      [employer]          cc: member records                    Civil Rights Notice  Discrimination is against the law. [employer] does not discriminate on the basis of any of the following:    Race    Color    National Origin    Creed    Confucianism    Age    Public Assistance Status    Receipt of Health Care Services    Disability (including physical or mental impairment)    Sex (including sex stereotypes and gender  identity)    Marital Status    Political Beliefs    Medical Condition    Genetic Information    Sexual Orientation    Claims Experience    Medical History    Health Status    Auxiliary Aids and Services:  [employer] provides auxiliary aids and services, like qualified interpreters or information in accessible formats, free of charge and in a timely manner, to ensure an equal opportunity to participate in our health care programs. Contact [employer] Customer Service at [employer]/contact medicaid or call [phone] (toll free); TTY:718 or at [employer]/contactUrban Tax Service and Bookkeepingcaid.    Language Assistance Services:  [employer] provides translated documents and spoken language interpreting, free of charge and in a timely manner, when language assistance services are necessary to ensure limited English speakers have meaningful access to our information and services. Contact [employer] at [phone] (toll free); TTY: 499 or [employer]/contactmedicaid.     Civil Rights Complaints  You have the right to file a discrimination complaint if you believe you were treated in a discriminatory way by [employer]. You may contact any of the following four agencies directly to file a discrimination complaint.    U.S. Department of Health and Human Services  Office for Civil Rights (OCR)  You have the right to file a complaint with the OCR, a federal agency, if you believe you have been discriminated against because of any of the following:    Race    Disability    Color    Sex    National Origin    Age      Contact the OCR directly to file a complaint:         Director         U.S. Department of Health and Human Services  Office for Civil Rights         [address] [place] [address], [place] [address]         [phone] (Voice)         [phone] (TDD)         Complaint Portal - https://ocrportal.hhs.gov/ocr/portal/lobby.jsf     [place] Department of Human Rights ([facility])  In [place], you have the right to  file a complaint with the MDHR if you believe you have been discriminated against because of any of the following:      Race    Color    National Origin    Islam    Creed    Sex    Sexual Orientation    Marital Status    Public Assistance Status    Disability    Contact the MDHR directly to file a complaint:         [place] Department of Human Rights         [facility], [address] [place] [address]         [phone] (voice)          [phone] (toll free)         711 or [phone] ([place] Relay)         [phone] (Fax)         [email or web address] (Email)     [place] Department of Human Services (DHS)  You have the right to file a complaint with [facility] if you believe you have been discriminated against in our health care programs because of any of the following:    Race    Color    National Origin    Creed    Islam    Age    Public Assistance Status    Receipt of Health Care Services    Disability (including physical or mental impairment)    Sex (including sex stereotypes and gender identity)    Marital Status    Political Beliefs    Medical Condition    Genetic Information    Sexual Orientation    Claims Experience    Medical History    Health Status    Complaints must be in writing and filed within 180 days of the date you discovered the alleged discrimination. The complaint must contain your name and address and describe the discrimination you are complaining about. After we get your complaint, we will review it and notify you in writing about whether we have authority to investigate. If we do, we will investigate the complaint.      [facility] will notify you in writing of the investigation s outcome. You have a right to appeal the outcome if you disagree with the decision. To appeal, you must send a written request to have [facility] review the investigation outcome period. Be brief and state why you disagree with the decision. Include additional information you think is important.       If you file a complaint in this way, the people who work for the agency named in the complaint cannot retaliate against you. This means they cannot punish you in any way for filing a complaint. Filing a complaint in this way does not stop you from seeking out other legal or administration actions.     Contact DHS directly to file a discrimination complaint:        Civil Rights Coordinator        [facility]        Equal Opportunity and Access Division        [address] [address]        [place] [address] [phone] (voice) or use your preferred relay service     [facility] Complaint Notice   You have the right to file a complaint with [employer] if you believe you have been discriminated against because of any of the following:       Medical condition    Health status    Receipt of health care services    Claims experience    Medical history    Genetic information    Disability (including mental or physical impairment)    Marital status    Age    Sex (including sex stereotypes and gender identity)    Sexual orientation    National origin    Race    Color    Yazidi    Creed    Public assistance status    Political beliefs    You can file a complaint and ask for help in filing a complaint in person or by mail, phone, fax, or email at:     [facility] Civil Rights Coordinator  [facility]  [address], [place] [address] [phone] (voice and fax) or TTY:388  Email: [email or web address]    American Indians can continue to use [place] and  Health Services (IHS) clinics. We will not require prior approval or impose any conditions for you to get services at these clinics. For elders age 65 years and older this includes Elderly Waiver (EW) services accessed through the [place]. If a doctor or other provider in a [place] or IHS clinic refers you to a provider in our network, we will not require you to see your primary care provider prior to the  referral.    For accessible formats of this publication or assistance with equal or access to our services, visit [email or web address]/contactmedicaid, or call [phone] (toll free) or use your preferred relay service.

## 2019-08-20 NOTE — PROGRESS NOTES
[facility] Care Coordination Contact    Received after visit chart from care coordinator.  Completed following tasks: Mailed copy of care plan to client, Updated services in access and Submitted referrals/auths for Senior Home Living: PCA; Irvin Cabrales: ADC with Transportation; [employer]: Supplies  Chart was returned to CC.    Provider Signature - No POC Shared:  Member indicates that they do not want their POC shared with any EW providers.  [facility]:  Faxed completed PCA assessment to PCA Agency and mailed copies to member.  Faxed MD Communication to PCP.  Emailed referral form for auth to [employer].  Order placed with [employer] (p: [phone]; f: [phone]) for High Protein Ensure: Vanilla and Chocolate: One per day.  Order is continuous. Access updated.  As required, authorization submitted to health plan.    Amadeo Hercules  Care Management Specialist  [facility]  [phone]

## 2019-09-16 ENCOUNTER — TELEPHONE (OUTPATIENT)
Dept: INTERNAL MEDICINE | Facility: CLINIC | Age: 74
End: 2019-09-16

## 2019-09-30 ENCOUNTER — PATIENT OUTREACH (OUTPATIENT)
Dept: GERIATRIC MEDICINE | Facility: CLINIC | Age: 74
End: 2019-09-30

## 2019-09-30 NOTE — PROGRESS NOTES
[facility] Care Coordination Contact    Received VM from moni Calix and returned call.  She states that Sruthi, member's , called to say that the dental office said they could not see him on Sept 30th because his [place] is closed. Care Coordinator checked Minits and member is open to [place] and [facility]. Also explained that member has 90 days of coverage should [place] close.  VIRGINIA Durbin, [facility] Care Coordinator  Tel [phone]  Fax [phone]

## 2019-10-18 ENCOUNTER — OFFICE VISIT (OUTPATIENT)
Dept: FAMILY MEDICINE | Facility: CLINIC | Age: 74
End: 2019-10-18
Payer: COMMERCIAL

## 2019-10-18 VITALS
WEIGHT: 124 LBS | RESPIRATION RATE: 16 BRPM | TEMPERATURE: 97.2 F | OXYGEN SATURATION: 96 % | BODY MASS INDEX: 19.42 KG/M2 | HEART RATE: 67 BPM | DIASTOLIC BLOOD PRESSURE: 72 MMHG | SYSTOLIC BLOOD PRESSURE: 158 MMHG

## 2019-10-18 DIAGNOSIS — R05.8 COUGH PRESENT FOR GREATER THAN 3 WEEKS: Primary | ICD-10-CM

## 2019-10-18 PROCEDURE — 99213 OFFICE O/P EST LOW 20 MIN: CPT | Performed by: PHYSICIAN ASSISTANT

## 2019-10-18 RX ORDER — AZITHROMYCIN 250 MG/1
TABLET, FILM COATED ORAL
Qty: 6 TABLET | Refills: 0 | Status: SHIPPED | OUTPATIENT
Start: 2019-10-18 | End: 2019-11-18

## 2019-10-18 NOTE — PROGRESS NOTES
Subjective     Man Yamilet Gonzalez is a 74 year old male who presents to clinic today for the following health issues:    HPI   Acute Illness   Acute illness concerns: Cough  Onset: 1-2 months    Fever: no    Chills/Sweats: no    Headache (location?): no    Sinus Pressure:no    Conjunctivitis:  no    Ear Pain: no    Rhinorrhea: Yes    Congestion: no    Sore Throat: no     Cough: YES-productive of yellow sputum    Wheeze: YES- tight but not short of breath    Decreased Appetite: no    Nausea: no    Vomiting: no    Diarrhea:  no    Dysuria/Freq.: no    Fatigue/Achiness: YES    Sick/Strep Exposure: YES- Goes to adult      Therapies Tried and outcome: OTC cold medicine which helps a little    No history of asthma or COPD.     Patient Active Problem List   Diagnosis     Advanced directives, counseling/discussion     Hyperlipidemia LDL goal <160     Health Care Home     Major depression     KULWANT (obstructive sleep apnea)     Gait instability     Mixed incontinence     Type 2 diabetes mellitus without complication, without long-term current use of insulin (H)     Primary osteoarthritis of both knees     Past Surgical History:   Procedure Laterality Date     COLONOSCOPY       COLONOSCOPY N/A 2018    Procedure: COLONOSCOPY;  Surgeon: Brent Jeffries MD;  Location:  GI       Social History     Tobacco Use     Smoking status: Former Smoker     Types: Cigarettes     Last attempt to quit: 2001     Years since quittin.1     Smokeless tobacco: Never Used   Substance Use Topics     Alcohol use: No     Alcohol/week: 0.0 standard drinks     Family History   Problem Relation Age of Onset     Unknown/Adopted Mother      Unknown/Adopted Father      Colon Cancer No family hx of          Current Outpatient Medications   Medication Sig Dispense Refill     azithromycin (ZITHROMAX) 250 MG tablet Take 2 tablets (500 mg) by mouth daily for 1 day, THEN 1 tablet (250 mg) daily for 4 days. 6 tablet 0     acetaminophen (TYLENOL)  325 MG tablet Take 2 tablets (650 mg) by mouth every 6 hours as needed for mild pain 100 tablet 3     albuterol (PROAIR HFA/PROVENTIL HFA/VENTOLIN HFA) 108 (90 BASE) MCG/ACT Inhaler Inhale 2 puffs into the lungs every 6 hours as needed for shortness of breath / dyspnea or wheezing 1 Inhaler 0     calcium carbonate 500 mg-vitamin D 200 units (OSCAL WITH D;OYSTER SHELL CALCIUM) 500-200 MG-UNIT per tablet Take 2 tablets by mouth daily       naproxen (NAPROSYN) 500 MG tablet Take 1 tablet (500 mg) by mouth 2 times daily as needed for moderate pain 30 tablet 3     ORDER FOR DME Equipment being ordered: CPAP supplies (mask, tubing etc.) 1 Device 5     sertraline (ZOLOFT) 100 MG tablet Take 1 tablet (100 mg) by mouth daily 90 tablet 3     tamsulosin (FLOMAX) 0.4 MG capsule Take 1 capsule (0.4 mg) by mouth daily 90 capsule 3     VITAMIN D3 1000 units tablet TAKE 1 TABLET (1,000 UNITS) BY MOUTH DAILY 90 tablet 1     No Known Allergies      Reviewed and updated as needed this visit by Provider         Review of Systems   ROS COMP: Constitutional, HEENT, cardiovascular, pulmonary, gi and gu systems are negative, except as otherwise noted.      Objective    BP (!) 158/72 (BP Location: Right arm, Patient Position: Chair, Cuff Size: Adult Regular)   Pulse 67   Temp 97.2  F (36.2  C) (Oral)   Resp 16   Wt 56.2 kg (124 lb)   SpO2 96%   BMI 19.42 kg/m    Body mass index is 19.42 kg/m .         Physical Exam   GENERAL: healthy, alert and no distress  EYES: Eyes grossly normal to inspection, PERRL and conjunctivae and sclerae normal  HENT: ear canals and TM's normal, nose and mouth without ulcers or lesions  RESP: lungs clear to auscultation - no rales, rhonchi or wheezes  CV: regular rate and rhythm, normal S1 S2, no S3 or S4, no murmur, click or rub, no peripheral edema and peripheral pulses strong  MS: no gross musculoskeletal defects noted, no edema  SKIN: no suspicious lesions or rashes  NEURO: Normal strength and tone,  mentation intact and speech normal  PSYCH: mentation appears normal, affect normal/bright  LYMPH: no cervical, supraclavicular, axillary, or inguinal adenopathy    Diagnostic Test Results:  none         Assessment & Plan     (R05) Cough present for greater than 3 weeks  (primary encounter diagnosis)    Comment: Treat with zithromax to cover for bacterial causes. Lungs clear on exam today. Explained that coughs can linger and that this may not improve right away. They are in agreement with this plan.    Plan: azithromycin (ZITHROMAX) 250 MG tablet                 Patient Instructions   Take the complete course of the antibiotic.    Follow-up if not improving in 2-3 weeks or sooner if worsening.      For cough, dextromethorphan/guaifenesin combinations help loosen secretions and suppress cough safely without significant risk of sedation. Often 2 puffs four times daily of an albuterol inhaler will help with bronchitis.  This is a prescription medicine.          No follow-ups on file.    Moiz Tran PA-C  [facility]

## 2019-10-18 NOTE — PATIENT INSTRUCTIONS
Take the complete course of the antibiotic.    Follow-up if not improving in 2-3 weeks or sooner if worsening.      For cough, dextromethorphan/guaifenesin combinations help loosen secretions and suppress cough safely without significant risk of sedation. Often 2 puffs four times daily of an albuterol inhaler will help with bronchitis.  This is a prescription medicine.

## 2019-11-18 ENCOUNTER — OFFICE VISIT (OUTPATIENT)
Dept: INTERNAL MEDICINE | Facility: CLINIC | Age: 74
End: 2019-11-18
Payer: COMMERCIAL

## 2019-11-18 VITALS
HEIGHT: 67 IN | WEIGHT: 124 LBS | HEART RATE: 82 BPM | SYSTOLIC BLOOD PRESSURE: 132 MMHG | DIASTOLIC BLOOD PRESSURE: 78 MMHG | RESPIRATION RATE: 12 BRPM | OXYGEN SATURATION: 95 % | TEMPERATURE: 98 F | BODY MASS INDEX: 19.46 KG/M2

## 2019-11-18 DIAGNOSIS — M25.512 CHRONIC PAIN OF BOTH SHOULDERS: ICD-10-CM

## 2019-11-18 DIAGNOSIS — I25.10 CORONARY ARTERY CALCIFICATION: ICD-10-CM

## 2019-11-18 DIAGNOSIS — M25.511 CHRONIC PAIN OF BOTH SHOULDERS: ICD-10-CM

## 2019-11-18 DIAGNOSIS — M17.0 PRIMARY OSTEOARTHRITIS OF BOTH KNEES: ICD-10-CM

## 2019-11-18 DIAGNOSIS — Z87.891 PERSONAL HISTORY OF TOBACCO USE, PRESENTING HAZARDS TO HEALTH: Primary | ICD-10-CM

## 2019-11-18 DIAGNOSIS — G89.29 CHRONIC PAIN OF BOTH SHOULDERS: ICD-10-CM

## 2019-11-18 DIAGNOSIS — F33.41 RECURRENT MAJOR DEPRESSIVE DISORDER, IN PARTIAL REMISSION (H): ICD-10-CM

## 2019-11-18 DIAGNOSIS — J20.9 ACUTE BRONCHITIS, UNSPECIFIED ORGANISM: ICD-10-CM

## 2019-11-18 DIAGNOSIS — R91.8 PULMONARY NODULES: ICD-10-CM

## 2019-11-18 PROCEDURE — 99214 OFFICE O/P EST MOD 30 MIN: CPT | Performed by: INTERNAL MEDICINE

## 2019-11-18 RX ORDER — NAPROXEN 500 MG/1
500 TABLET ORAL 2 TIMES DAILY PRN
Qty: 30 TABLET | Refills: 3 | Status: SHIPPED | OUTPATIENT
Start: 2019-11-18 | End: 2020-01-08

## 2019-11-18 RX ORDER — VITAMIN B COMPLEX
TABLET ORAL
Qty: 90 TABLET | Refills: 3 | Status: SHIPPED | OUTPATIENT
Start: 2019-11-18 | End: 2020-11-12

## 2019-11-18 RX ORDER — ALBUTEROL SULFATE 90 UG/1
2 AEROSOL, METERED RESPIRATORY (INHALATION) EVERY 6 HOURS PRN
Qty: 1 INHALER | Refills: 0 | Status: SHIPPED | OUTPATIENT
Start: 2019-11-18 | End: 2022-04-30

## 2019-11-18 RX ORDER — CODEINE PHOSPHATE AND GUAIFENESIN 10; 100 MG/5ML; MG/5ML
1-2 SOLUTION ORAL EVERY 4 HOURS PRN
Qty: 180 ML | Refills: 0 | Status: SHIPPED | OUTPATIENT
Start: 2019-11-18 | End: 2021-09-27

## 2019-11-18 RX ORDER — SERTRALINE HYDROCHLORIDE 100 MG/1
100 TABLET, FILM COATED ORAL DAILY
Qty: 90 TABLET | Refills: 3 | Status: SHIPPED | OUTPATIENT
Start: 2019-11-18 | End: 2021-09-27

## 2019-11-18 ASSESSMENT — MIFFLIN-ST. JEOR: SCORE: 1256.09

## 2019-11-18 NOTE — PROGRESS NOTES
Subjective     Man Yamilet Gonzalez is a 74 year old male who presents to clinic today for the following health issues:    HPI   Cough and congestion:    Presents with cough for 2 months. Treated with Zithromax. Not resolved. Mild phlegm. No fever, CP or SOB. Has h/o smoking , over 50 years, quit 15 years ago.   No weight loss, no night sweats.   Has h/o chronic knees pain, OA related , able to ambulate no edema, no falls.   Has H/O BPH. On treatment with Flomax . Has mild symptoms of frequency, decreased urinary stream, no dysuria. No side effects from medications.        Patient Active Problem List   Diagnosis     Advanced directives, counseling/discussion     Hyperlipidemia LDL goal <160     Health Care Home     Major depression     KULWANT (obstructive sleep apnea)     Gait instability     Mixed incontinence     Type 2 diabetes mellitus without complication, without long-term current use of insulin (H)     Primary osteoarthritis of both knees     Past Surgical History:   Procedure Laterality Date     COLONOSCOPY       COLONOSCOPY N/A 2018    Procedure: COLONOSCOPY;  Surgeon: Brent Jeffries MD;  Location:  GI       Social History     Tobacco Use     Smoking status: Former Smoker     Types: Cigarettes     Last attempt to quit: 2001     Years since quittin.1     Smokeless tobacco: Never Used   Substance Use Topics     Alcohol use: No     Alcohol/week: 0.0 standard drinks     Family History   Problem Relation Age of Onset     Unknown/Adopted Mother      Unknown/Adopted Father      Colon Cancer No family hx of          Current Outpatient Medications   Medication Sig Dispense Refill     acetaminophen (TYLENOL) 325 MG tablet Take 2 tablets (650 mg) by mouth every 6 hours as needed for mild pain 100 tablet 3     albuterol (PROAIR HFA/PROVENTIL HFA/VENTOLIN HFA) 108 (90 Base) MCG/ACT inhaler Inhale 2 puffs into the lungs every 6 hours as needed for shortness of breath / dyspnea or wheezing 1 Inhaler 0     calcium  "carbonate 500 mg-vitamin D 200 units (OSCAL WITH D;OYSTER SHELL CALCIUM) 500-200 MG-UNIT per tablet Take 2 tablets by mouth daily       guaiFENesin-codeine (ROBITUSSIN AC) 100-10 MG/5ML solution Take 5-10 mLs by mouth every 4 hours as needed for cough 180 mL 0     naproxen (NAPROSYN) 500 MG tablet Take 1 tablet (500 mg) by mouth 2 times daily as needed for moderate pain 30 tablet 3     ORDER FOR DME Equipment being ordered: CPAP supplies (mask, tubing etc.) 1 Device 5     sertraline (ZOLOFT) 100 MG tablet Take 1 tablet (100 mg) by mouth daily 90 tablet 3     tamsulosin (FLOMAX) 0.4 MG capsule Take 1 capsule (0.4 mg) by mouth daily 90 capsule 3     Vitamin D3 (VITAMIN D3) 25 mcg (1000 units) tablet TAKE 1 TABLET (1,000 UNITS) BY MOUTH DAILY 90 tablet 3         Reviewed and updated as needed this visit by Provider         Review of Systems   ROS COMP: Constitutional, HEENT, cardiovascular, pulmonary, gi and gu systems are negative, except as otherwise noted.  Constitutional, HEENT, cardiovascular, pulmonary, GI, , musculoskeletal, neuro, skin, endocrine and psych systems are negative, except as otherwise noted.      Objective    Ht 1.702 m (5' 7\")   BMI 19.42 kg/m    Body mass index is 19.42 kg/m .  Physical Exam   GENERAL: healthy, alert and no distress  NECK: no adenopathy, no asymmetry, masses, or scars and thyroid normal to palpation  RESP: lungs clear to auscultation - no rales, rhonchi or wheezes  CV: regular rate and rhythm, normal S1 S2, no S3 or S4, no murmur, click or rub, no peripheral edema and peripheral pulses strong  ABDOMEN: soft, nontender, no hepatosplenomegaly, no masses and bowel sounds normal  MS: no gross musculoskeletal defects noted, no edema    Diagnostic Test Results:  Labs reviewed in Epic        Assessment & Plan   Problem List Items Addressed This Visit     Major depression    Relevant Medications    sertraline (ZOLOFT) 100 MG tablet    Primary osteoarthritis of both knees    Relevant " Medications    Vitamin D3 (VITAMIN D3) 25 mcg (1000 units) tablet    naproxen (NAPROSYN) 500 MG tablet      Other Visit Diagnoses     Personal history of tobacco use, presenting hazards to health    -  Primary    Relevant Orders    CT Chest Lung Cancer Scrn Low Dose wo    Acute bronchitis, unspecified organism        Relevant Medications    albuterol (PROAIR HFA/PROVENTIL HFA/VENTOLIN HFA) 108 (90 Base) MCG/ACT inhaler    guaiFENesin-codeine (ROBITUSSIN AC) 100-10 MG/5ML solution    Chronic pain of both shoulders        Relevant Medications    Vitamin D3 (VITAMIN D3) 25 mcg (1000 units) tablet    naproxen (NAPROSYN) 500 MG tablet         Assess CT chest   Symptomatic treatment   Follow up with results       See Patient Instructions  Return in about 6 months (around 5/18/2020) for Routine Visit.    Ray Sun MD  [facility]

## 2019-11-18 NOTE — NURSING NOTE
"Vital signs:  Temp: 98  F (36.7  C) Temp src: Oral BP: 132/78 Pulse: 82   Resp: 12 SpO2: 95 %     Height: 170.2 cm (5' 7\") Weight: 56.2 kg (124 lb)  Estimated body mass index is 19.42 kg/m  as calculated from the following:    Height as of this encounter: 1.702 m (5' 7\").    Weight as of this encounter: 56.2 kg (124 lb).          "

## 2019-11-26 ENCOUNTER — HOSPITAL ENCOUNTER (OUTPATIENT)
Dept: CT IMAGING | Facility: CLINIC | Age: 74
Discharge: HOME | End: 2019-11-26
Attending: INTERNAL MEDICINE | Admitting: INTERNAL MEDICINE
Payer: COMMERCIAL

## 2019-11-26 DIAGNOSIS — Z87.891 PERSONAL HISTORY OF TOBACCO USE, PRESENTING HAZARDS TO HEALTH: ICD-10-CM

## 2019-11-26 PROCEDURE — G0297 LDCT FOR LUNG CA SCREEN: HCPCS

## 2019-11-27 ENCOUNTER — TELEPHONE (OUTPATIENT)
Dept: INTERNAL MEDICINE | Facility: CLINIC | Age: 74
End: 2019-11-27

## 2019-11-27 DIAGNOSIS — R91.8 ABNORMAL CT LUNG SCREENING: ICD-10-CM

## 2019-11-27 NOTE — TELEPHONE ENCOUNTER
[facility] Radiology Lung Cancer Screening CT result notification [Documentation only]:     LDCT/Lung Cancer Screening CT Exam date: 11/26/19  Radiologist Impression AND Recommendations:   ACR Assessment Category:  Lung-RADS Category 3. Probably benign  finding(s)- short term follow up suggested with 6 month low dose CT  (please order exam code IMG 2163).    Pertinent Findings:  Lungs: Multiple bilateral pulmonary opacities and a few nodules  greatest in the right lower lobe with scattered clustered alveolar  opacities in the right lower lobe. Irregular elongated opacities in  the right upper lobe series 3 image 21-23 are identified as well as  additional right apical opacities such as 0.8 cm groundglass nodule  and 0.4 cm more well circumscribed right apical nodule on image 6 of  series 3. Patchy left lung opacities are very mild. There is  significant motion artifact     Ordering Provider: Ray Sun MD  Josh Gonzalez did receive the remaining radiology results from her provider.     Lung Nodule Program Protocol recommendation [Pertaining to lung nodules]:    Lung RADS 3 Protocol: Results RN to notify Patient of results/recommendations and place order for 6 month CT ([id number]) - MD white   CT Chest order placed to be completed within 6 months (Yes/No):  Yes, Scheduling will contact Patient 1 month prior to due date of 5/27/2020  RN communicated the lung nodule finding to the patient (Yes/No):  No, notified by PCP  The patient had the following questions: NA  Correct letter sent as per Lung nodule protocol (Yes/No):  Yes    If scheduling LDCT only, RN will contact Image Scheduling Team  Hours available (all sites below):  Mon-Fri 7A to 8P; Sat 7A to 3P.  No schedulers available on Sunday.    [facility] ([place] and [place]): [phone]    [place] region ([facility] [place]): [phone]    South region ([facility]): [phone]    Customer Service Center Result RN  Lung Nodule  and Emergency Dept Lab Result F/u YASMIN  Ph# [phone]

## 2019-12-04 ENCOUNTER — TELEPHONE (OUTPATIENT)
Dept: INTERNAL MEDICINE | Facility: CLINIC | Age: 74
End: 2019-12-04

## 2019-12-04 DIAGNOSIS — R05.3 CHRONIC COUGH: Primary | ICD-10-CM

## 2019-12-04 NOTE — TELEPHONE ENCOUNTER
Patients daughter calling Calix on C2C and she has questions about stress test and pulmonary referral. Ok to call and lm [phone]

## 2019-12-05 NOTE — TELEPHONE ENCOUNTER
ONCOLOGY INTAKE: Records Information      APPT INFORMATION: 2/3/20 - [facility] - [facility]  Referring provider:  Corinne  Referring provider s clinic:  [facility]  Reason for visit/diagnosis:  pulmonary nodules  Has patient been notified of appointment date and time?: Yes    RECORDS INFORMATION:  Were the records received with the referral (via [employer])? Internal referral    Has patient been seen for any external appt for this diagnosis? No    If yes, where? NA    Has patient had any imaging or procedures outside of [place] for this condition? No      If Yes, where? NA    ADDITIONAL INFORMATION:  Scheduled via call from pt via  [employer] [phone]

## 2019-12-06 NOTE — TELEPHONE ENCOUNTER
RECORDS STATUS - ALL OTHER DIAGNOSIS      RECORDS RECEIVED FROM: Epic/   DATE RECEIVED: 2/3/2020    NOTES STATUS DETAILS   OFFICE NOTE from referring provider Zoey Sun   OFFICE NOTE from medical oncologist N/A    DISCHARGE SUMMARY from hospital N/A    DISCHARGE REPORT from the ER N/A    OPERATIVE REPORT N/A    MEDICATION LIST Complete [facility]   CLINICAL TRIAL TREATMENTS TO DATE N./A    LABS     PATHOLOGY REPORTS N/A    ANYTHING RELATED TO DIAGNOSIS Complete EPIC   GENONOMIC TESTING     TYPE:     IMAGING (NEED IMAGES & REPORT)     CT SCANS Complete PACS   Xray Chest Complete  PACS   MRI Complete PACS   MAMMO     ULTRASOUND     PET       No need to call pt- all records are in [facility]

## 2019-12-17 ENCOUNTER — HOSPITAL ENCOUNTER (OUTPATIENT)
Dept: CARDIOLOGY | Facility: CLINIC | Age: 74
Discharge: HOME | End: 2019-12-17
Attending: INTERNAL MEDICINE | Admitting: INTERNAL MEDICINE
Payer: COMMERCIAL

## 2019-12-17 DIAGNOSIS — I25.10 CORONARY ARTERY CALCIFICATION: ICD-10-CM

## 2019-12-17 PROCEDURE — 93350 STRESS TTE ONLY: CPT | Mod: 26 | Performed by: INTERNAL MEDICINE

## 2019-12-17 PROCEDURE — 93325 DOPPLER ECHO COLOR FLOW MAPG: CPT | Mod: TC

## 2019-12-17 PROCEDURE — 93016 CV STRESS TEST SUPVJ ONLY: CPT | Performed by: INTERNAL MEDICINE

## 2019-12-17 PROCEDURE — 93321 DOPPLER ECHO F-UP/LMTD STD: CPT | Mod: 26 | Performed by: INTERNAL MEDICINE

## 2019-12-17 PROCEDURE — 93018 CV STRESS TEST I&R ONLY: CPT | Performed by: INTERNAL MEDICINE

## 2019-12-17 PROCEDURE — 93325 DOPPLER ECHO COLOR FLOW MAPG: CPT | Mod: 26 | Performed by: INTERNAL MEDICINE

## 2019-12-17 NOTE — PROGRESS NOTES
Stress echo completed. Contacted Dr. Glez regarding possible positive stress images and ECG. Patient asymptomatic. Patient given the OK to leave.

## 2020-01-07 DIAGNOSIS — M17.0 PRIMARY OSTEOARTHRITIS OF BOTH KNEES: ICD-10-CM

## 2020-01-07 DIAGNOSIS — G89.29 CHRONIC PAIN OF BOTH SHOULDERS: ICD-10-CM

## 2020-01-07 DIAGNOSIS — M25.511 CHRONIC PAIN OF BOTH SHOULDERS: ICD-10-CM

## 2020-01-07 DIAGNOSIS — M25.512 CHRONIC PAIN OF BOTH SHOULDERS: ICD-10-CM

## 2020-01-07 NOTE — TELEPHONE ENCOUNTER
"Requested Prescriptions   Pending Prescriptions Disp Refills     naproxen (NAPROSYN) 500 MG tablet [Pharmacy Med Name: Naproxen Oral Tablet 500 MG] 30 tablet 2     Sig: TAKE 1 TABLET (500 MG) BY MOUTH 2 TIMES DAILY AS NEEDED FOR MODERATE PAIN  Last Written Prescription Date:  11/18/19  Last Fill Quantity: 30 tab,  # refills: 3   Last office visit: 11/18/2019 with prescribing provider:  Corinne   Future Office Visit:         NSAID Medications Failed - 1/7/2020  7:00 AM        Failed - Normal ALT on file in past 12 months     Recent Labs   Lab Test 10/24/18  1532   ALT 26             Failed - Normal AST on file in past 12 months     Recent Labs   Lab Test 10/24/18  1532   AST 26             Failed - Patient is age 6-64 years        Failed - Normal CBC on file in past 12 months     Recent Labs   Lab Test 10/24/18  1532   WBC 7.1   RBC 5.52   HGB 13.8   HCT 43.0                    Failed - Normal serum creatinine on file in past 12 months     Recent Labs   Lab Test 10/24/18  1532   CR 1.20             Passed - Blood pressure under 140/90 in past 12 months     BP Readings from Last 3 Encounters:   11/18/19 132/78   10/18/19 (!) 158/72   04/18/19 146/62                 Passed - Recent (12 mo) or future (30 days) visit within the authorizing provider's specialty     Patient has had an office visit with the authorizing provider or a provider within the authorizing providers department within the previous 12 mos or has a future within next 30 days. See \"Patient Info\" tab in inbasket, or \"Choose Columns\" in Meds & Orders section of the refill encounter.              Passed - Medication is active on med list           "

## 2020-01-08 RX ORDER — NAPROXEN 500 MG/1
500 TABLET ORAL 2 TIMES DAILY PRN
Qty: 30 TABLET | Refills: 2 | Status: SHIPPED | OUTPATIENT
Start: 2020-01-08

## 2020-01-20 ENCOUNTER — PATIENT OUTREACH (OUTPATIENT)
Dept: GERIATRIC MEDICINE | Facility: CLINIC | Age: 75
End: 2020-01-20

## 2020-02-03 ENCOUNTER — ONCOLOGY VISIT (OUTPATIENT)
Dept: ONCOLOGY | Facility: CLINIC | Age: 75
End: 2020-02-03
Attending: INTERNAL MEDICINE
Payer: COMMERCIAL

## 2020-02-03 ENCOUNTER — PRE VISIT (OUTPATIENT)
Dept: ONCOLOGY | Facility: CLINIC | Age: 75
End: 2020-02-03

## 2020-02-03 VITALS
TEMPERATURE: 97.6 F | RESPIRATION RATE: 16 BRPM | DIASTOLIC BLOOD PRESSURE: 72 MMHG | OXYGEN SATURATION: 99 % | HEART RATE: 65 BPM | SYSTOLIC BLOOD PRESSURE: 146 MMHG | WEIGHT: 124.8 LBS | BODY MASS INDEX: 19.55 KG/M2

## 2020-02-03 DIAGNOSIS — R91.8 PULMONARY NODULES: ICD-10-CM

## 2020-02-03 DIAGNOSIS — J40 BRONCHITIS: Primary | ICD-10-CM

## 2020-02-03 PROCEDURE — G0463 HOSPITAL OUTPT CLINIC VISIT: HCPCS

## 2020-02-03 RX ORDER — AMOXICILLIN AND CLAVULANATE POTASSIUM 500; 125 MG/1; MG/1
1 TABLET, FILM COATED ORAL 2 TIMES DAILY
Qty: 28 TABLET | Refills: 0 | Status: SHIPPED | OUTPATIENT
Start: 2020-02-03 | End: 2020-02-17

## 2020-02-03 ASSESSMENT — PAIN SCALES - GENERAL: PAINLEVEL: NO PAIN (1)

## 2020-02-03 NOTE — PATIENT INSTRUCTIONS
Take antibiotics one pill twice a day for 2 weeks.  Space out the antibiotics and Ensure (at least 3 hours pass between taking Ensure and medication)    Repeat CT in March     CT scheduled 2/27/20  Appt with Dr. Mishra 3/2/20  Ebony Nina RN on 2/5/2020 at 10:07 AM

## 2020-02-03 NOTE — PROGRESS NOTES
[facility] Initial Visit    Reason for Visit  Josh Gonzalez is a 74 year old male who is referred by Corinne for abnormal lung cancer screening   Pulmonary HPI    Cough since September worse at night. He got cough syrup and antibiotics prescribed. Didn't take the antibiotic because when it came in the mail, he wasn't sure if he needed it. No GERD symptoms.      Other active medical problems include:   - KULWANT on CPAP ([facility]) for almost 5 years, wondering about replacing parts      Exposure history: Denies asbestos or radon exposure   TB risk factors: Yes  Prior Imaging:No  Constitutional Symptoms: Yes weight loss a couple of pounds from poor dentition, getting dentures  Personal history of cancer:No  Up to date on age-appropriate cancer screening:Yes    ROS Pulmonary  Dyspnea: No, Cough: Yes, Chest pain: No, Wheezing: No, Sputum Production: No, Hemoptysis: No  A complete ROS was otherwise negative except as noted in the HPI.  The patient was seen and examined by Maite Mishra MD   Current Outpatient Medications   Medication     acetaminophen (TYLENOL) 325 MG tablet     albuterol (PROAIR HFA/PROVENTIL HFA/VENTOLIN HFA) 108 (90 Base) MCG/ACT inhaler     calcium carbonate 500 mg-vitamin D 200 units (OSCAL WITH D;OYSTER SHELL CALCIUM) 500-200 MG-UNIT per tablet     guaiFENesin-codeine (ROBITUSSIN AC) 100-10 MG/5ML solution     naproxen (NAPROSYN) 500 MG tablet     ORDER FOR DME     sertraline (ZOLOFT) 100 MG tablet     tamsulosin (FLOMAX) 0.4 MG capsule     Vitamin D3 (VITAMIN D3) 25 mcg (1000 units) tablet     No current facility-administered medications for this visit.      No Known Allergies  Social History     Socioeconomic History     Marital status:      Spouse name: Not on file     Number of children: 4     Years of education: Not on file     Highest education level: Not on file   Occupational History     Employer: UNEMPLOYED   Social Needs     Financial resource  strain: Not on file     Food insecurity:     Worry: Not on file     Inability: Not on file     Transportation needs:     Medical: Not on file     Non-medical: Not on file   Tobacco Use     Smoking status: Former Smoker     Packs/day: 2.00     Years: 40.00     Pack years: 80.00     Types: Cigarettes     Start date: 2/3/1960     Last attempt to quit: 2001     Years since quittin.4     Smokeless tobacco: Never Used   Substance and Sexual Activity     Alcohol use: No     Alcohol/week: 0.0 standard drinks     Drug use: No     Sexual activity: Never     Partners: Female   Lifestyle     Physical activity:     Days per week: Not on file     Minutes per session: Not on file     Stress: Not on file   Relationships     Social connections:     Talks on phone: Not on file     Gets together: Not on file     Attends Lutheran service: Not on file     Active member of club or organization: Not on file     Attends meetings of clubs or organizations: Not on file     Relationship status: Not on file     Intimate partner violence:     Fear of current or ex partner: Not on file     Emotionally abused: Not on file     Physically abused: Not on file     Forced sexual activity: Not on file   Other Topics Concern      Service Not Asked     Blood Transfusions No     Caffeine Concern No     Occupational Exposure No     Hobby Hazards No     Sleep Concern No     Stress Concern No     Weight Concern No     Special Diet No     Back Care Yes     Comment: LBP- sciatica      Exercise Yes     Comment: walking      Bike Helmet Not Asked     Seat Belt Yes     Self-Exams Not Asked     Parent/sibling w/ CABG, MI or angioplasty before 65F 55M? 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      Past Medical History:   Diagnosis Date     Depression      Depressive disorder      Mixed incontinence 2016     Myalgia      KULWANT on CPAP       Postnasal drip      ROTATOR CUFF (CAPSULE) SPRAIN      Past Surgical History:   Procedure Laterality Date     COLONOSCOPY       COLONOSCOPY N/A 12/6/2018    Procedure: COLONOSCOPY;  Surgeon: Brent Jeffries MD;  Location:  GI     Family History   Problem Relation Age of Onset     Unknown/Adopted Mother         most family members lived to 80s but no medical condition known     Unknown/Adopted Father      Colon Cancer No family hx of      Lung Cancer No family hx of        Exam:   BP (!) 146/72   Pulse 65   Temp 97.6  F (36.4  C) (Tympanic)   Resp 16   Wt 56.6 kg (124 lb 12.8 oz)   SpO2 99%   BMI 19.55 kg/m    GENERAL APPEARANCE: Well developed, well nourished, alert, and in no apparent distress.  EYES: PERRL, EOMI  HENT: Nasal mucosa with no edema and no hyperemia. No nasal polyps.  EARS: Canals clear, TMs normal  MOUTH: Oral mucosa is moist, without any lesions, no tonsillar enlargement, no oropharyngeal exudate.  NECK: supple, no masses, no thyromegaly.  LYMPHATICS: No significant axillary, cervical, or supraclavicular nodes.  RESP: normal percussion, good air flow throughout.  No crackles. No rhonchi. No wheezes.  CV: Normal S1, S2, regular rhythm, normal rate. No murmur.  No rub. No gallop. No LE edema.   ABDOMEN:  Bowel sounds normal, soft, nontender, no HSM or masses.   MS: extremities normal. No clubbing. No cyanosis.  SKIN: no rash on limited exam  NEURO: Mentation intact, speech normal, normal strength and tone, normal gait and stance  PSYCH: mentation appears normal. and affect normal/bright  Results:  - My interpretation of the images relevant for this visit includes: CT chest Nov 2019 RUL and RLL ill defined nodular opacities LungRADS 3  - My interpretation of the PFT's relevant for this visit includes: None     Assessment and plan: Man is a 73yo Chinese male referred for abnormal lung screening, lung nodules. He has KULWANT on CPAP and cough.   Lung nodule - seen on screening CT, ill-defined and  bilobar. He has a cough since October as well. Nonproductive. High risk for lung cancer but not eligible for screening by virtue of quit time.    Empiric antibiotics Augmentin. Repeat CT in March

## 2020-02-03 NOTE — LETTER
2/3/2020         RE: Josh Gonzalez  [address] [address] [address]        Dear Colleague,    Thank you for referring your patient, Josh Gonzalez, to the [facility]. Please see a copy of my visit note below.    [facility] Initial Visit    Reason for Visit  Josh Gonzalez is a 74 year old male who is referred by Corinne for abnormal lung cancer screening   Pulmonary HPI    Cough since September worse at night. He got cough syrup and antibiotics prescribed. Didn't take the antibiotic because when it came in the mail, he wasn't sure if he needed it. No GERD symptoms.      Other active medical problems include:   - KULWANT on CPAP ([facility]) for almost 5 years, wondering about replacing parts      Exposure history: Denies asbestos or radon exposure   TB risk factors: Yes  Prior Imaging:No  Constitutional Symptoms: Yes weight loss a couple of pounds from poor dentition, getting dentures  Personal history of cancer:No  Up to date on age-appropriate cancer screening:Yes    ROS Pulmonary  Dyspnea: No, Cough: Yes, Chest pain: No, Wheezing: No, Sputum Production: No, Hemoptysis: No  A complete ROS was otherwise negative except as noted in the HPI.  The patient was seen and examined by Maite Mishra MD   Current Outpatient Medications   Medication     acetaminophen (TYLENOL) 325 MG tablet     albuterol (PROAIR HFA/PROVENTIL HFA/VENTOLIN HFA) 108 (90 Base) MCG/ACT inhaler     calcium carbonate 500 mg-vitamin D 200 units (OSCAL WITH D;OYSTER SHELL CALCIUM) 500-200 MG-UNIT per tablet     guaiFENesin-codeine (ROBITUSSIN AC) 100-10 MG/5ML solution     naproxen (NAPROSYN) 500 MG tablet     ORDER FOR DME     sertraline (ZOLOFT) 100 MG tablet     tamsulosin (FLOMAX) 0.4 MG capsule     Vitamin D3 (VITAMIN D3) 25 mcg (1000 units) tablet     No current facility-administered medications for this visit.      No Known Allergies  Social History     Socioeconomic History      Marital status:      Spouse name: Not on file     Number of children: 4     Years of education: Not on file     Highest education level: Not on file   Occupational History     Employer: UNEMPLOYED   Social Needs     Financial resource strain: Not on file     Food insecurity:     Worry: Not on file     Inability: Not on file     Transportation needs:     Medical: Not on file     Non-medical: Not on file   Tobacco Use     Smoking status: Former Smoker     Packs/day: 2.00     Years: 40.00     Pack years: 80.00     Types: Cigarettes     Start date: 2/3/1960     Last attempt to quit: 2001     Years since quittin.4     Smokeless tobacco: Never Used   Substance and Sexual Activity     Alcohol use: No     Alcohol/week: 0.0 standard drinks     Drug use: No     Sexual activity: Never     Partners: Female   Lifestyle     Physical activity:     Days per week: Not on file     Minutes per session: Not on file     Stress: Not on file   Relationships     Social connections:     Talks on phone: Not on file     Gets together: Not on file     Attends Orthodox service: Not on file     Active member of club or organization: Not on file     Attends meetings of clubs or organizations: Not on file     Relationship status: Not on file     Intimate partner violence:     Fear of current or ex partner: Not on file     Emotionally abused: Not on file     Physically abused: Not on file     Forced sexual activity: Not on file   Other Topics Concern      Service Not Asked     Blood Transfusions No     Caffeine Concern No     Occupational Exposure No     Hobby Hazards No     Sleep Concern No     Stress Concern No     Weight Concern No     Special Diet No     Back Care Yes     Comment: LBP- sciatica      Exercise Yes     Comment: walking      Bike Helmet Not Asked     Seat Belt Yes     Self-Exams Not Asked     Parent/sibling w/ CABG, MI or angioplasty before 65F 55M? 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      Past Medical History:   Diagnosis Date     Depression      Depressive disorder      Mixed incontinence 12/20/2016     Myalgia      KULWANT on CPAP      Postnasal drip      ROTATOR CUFF (CAPSULE) SPRAIN      Past Surgical History:   Procedure Laterality Date     COLONOSCOPY       COLONOSCOPY N/A 12/6/2018    Procedure: COLONOSCOPY;  Surgeon: Brent Jeffries MD;  Location:  GI     Family History   Problem Relation Age of Onset     Unknown/Adopted Mother         most family members lived to 80s but no medical condition known     Unknown/Adopted Father      Colon Cancer No family hx of      Lung Cancer No family hx of        Exam:   BP (!) 146/72   Pulse 65   Temp 97.6  F (36.4  C) (Tympanic)   Resp 16   Wt 56.6 kg (124 lb 12.8 oz)   SpO2 99%   BMI 19.55 kg/m     GENERAL APPEARANCE: Well developed, well nourished, alert, and in no apparent distress.  EYES: PERRL, EOMI  HENT: Nasal mucosa with no edema and no hyperemia. No nasal polyps.  EARS: Canals clear, TMs normal  MOUTH: Oral mucosa is moist, without any lesions, no tonsillar enlargement, no oropharyngeal exudate.  NECK: supple, no masses, no thyromegaly.  LYMPHATICS: No significant axillary, cervical, or supraclavicular nodes.  RESP: normal percussion, good air flow throughout.  No crackles. No rhonchi. No wheezes.  CV: Normal S1, S2, regular rhythm, normal rate. No murmur.  No rub. No gallop. No LE edema.   ABDOMEN:  Bowel sounds normal, soft, nontender, no HSM or masses.   MS: extremities normal. No clubbing. No cyanosis.  SKIN: no rash on limited exam  NEURO: Mentation intact, speech normal, normal strength and tone, normal gait and stance  PSYCH: mentation appears normal. and affect normal/bright  Results:  - My interpretation of the images relevant for this visit includes: CT chest Nov 2019 RUL and RLL ill defined nodular opacities LungRADS 3  - My interpretation  of the PFT's relevant for this visit includes: None     Assessment and plan: Man is a 75yo Chinese male referred for abnormal lung screening, lung nodules. He has KULWANT on CPAP and cough.   Lung nodule - seen on screening CT, ill-defined and bilobar. He has a cough since October as well. Nonproductive. High risk for lung cancer but not eligible for screening by virtue of quit time.    Empiric antibiotics Augmentin. Repeat CT in March       Again, thank you for allowing me to participate in the care of your patient.        Sincerely,        Maite Mishra MD

## 2020-02-03 NOTE — NURSING NOTE
"Oncology Rooming Note    February 3, 2020 1:19 PM   Man Yamilet Gonzalez is a 74 year old male who presents for:    Chief Complaint   Patient presents with     Lung Nodule     New Patient     Initial Vitals: BP (!) 146/72   Pulse 65   Temp 97.6  F (36.4  C) (Tympanic)   Resp 16   Wt 56.6 kg (124 lb 12.8 oz)   SpO2 99%   BMI 19.55 kg/m   Estimated body mass index is 19.55 kg/m  as calculated from the following:    Height as of 11/18/19: 1.702 m (5' 7\").    Weight as of this encounter: 56.6 kg (124 lb 12.8 oz). Body surface area is 1.64 meters squared.  No Pain (1) Comment: Data Unavailable   No LMP for male patient.  Allergies reviewed: Yes  Medications reviewed: Yes    Medications: Medication refills not needed today.  Pharmacy name entered into [facility]:    [id number] - [place], [place] - [address]  [id number] [place], [place] - [address]    Clinical concerns: New Patient       Marion Paulson CMA              "

## 2020-02-14 ENCOUNTER — TRANSFERRED RECORDS (OUTPATIENT)
Dept: HEALTH INFORMATION MANAGEMENT | Facility: CLINIC | Age: 75
End: 2020-02-14

## 2020-02-27 ENCOUNTER — HOSPITAL ENCOUNTER (OUTPATIENT)
Dept: CT IMAGING | Facility: CLINIC | Age: 75
Discharge: HOME | End: 2020-02-27
Attending: INTERNAL MEDICINE | Admitting: INTERNAL MEDICINE
Payer: COMMERCIAL

## 2020-02-27 DIAGNOSIS — J40 BRONCHITIS: ICD-10-CM

## 2020-02-27 DIAGNOSIS — R91.8 PULMONARY NODULES: ICD-10-CM

## 2020-02-27 PROCEDURE — 71250 CT THORAX DX C-: CPT

## 2020-03-02 ENCOUNTER — ONCOLOGY VISIT (OUTPATIENT)
Dept: ONCOLOGY | Facility: CLINIC | Age: 75
End: 2020-03-02
Attending: INTERNAL MEDICINE
Payer: COMMERCIAL

## 2020-03-02 VITALS
HEART RATE: 64 BPM | TEMPERATURE: 97.2 F | OXYGEN SATURATION: 95 % | RESPIRATION RATE: 16 BRPM | WEIGHT: 127 LBS | BODY MASS INDEX: 19.89 KG/M2 | SYSTOLIC BLOOD PRESSURE: 153 MMHG | DIASTOLIC BLOOD PRESSURE: 75 MMHG

## 2020-03-02 DIAGNOSIS — R05.9 COUGH: Primary | ICD-10-CM

## 2020-03-02 PROCEDURE — G0463 HOSPITAL OUTPT CLINIC VISIT: HCPCS

## 2020-03-02 RX ORDER — DIPHENHYDRAMINE HCL 25 MG
25 TABLET ORAL AT BEDTIME
Qty: 30 TABLET | Refills: 11 | Status: SHIPPED | OUTPATIENT
Start: 2020-03-02

## 2020-03-02 RX ORDER — PREDNISONE 20 MG/1
20 TABLET ORAL DAILY
Qty: 5 TABLET | Refills: 0 | Status: SHIPPED | OUTPATIENT
Start: 2020-03-02 | End: 2021-06-14

## 2020-03-02 ASSESSMENT — PAIN SCALES - GENERAL: PAINLEVEL: MILD PAIN (3)

## 2020-03-02 NOTE — LETTER
3/2/2020         RE: Josh Gonzalez  [address] [address] [address]        Dear Colleague,    Thank you for referring your patient, Josh Gonzalez, to the [facility]. Please see a copy of my visit note below.    [facility] Initial Visit    Reason for Visit  Josh Gonzalez is a 74 year old male who is referred by Corinne for abnormal lung cancer screening   Pulmonary HPI    Since our last visit, he took the Augmentin for 8 days. He didn't notice improvement in symptoms. He is getting a new CPAP machine.    Cough is worse when lying down at night, not much cough during the day. He has clear saliva, not otherwise productive. The cough doesn't bother him but bothers his wife at night.     ROS Pulmonary  Dyspnea: No, Cough: Yes, Chest pain: No, Wheezing: No, Sputum Production: No, Hemoptysis: No  A complete ROS was otherwise negative except as noted in the HPI.  The patient was seen and examined by Maite Mishra MD   Current Outpatient Medications   Medication     acetaminophen (TYLENOL) 325 MG tablet     albuterol (PROAIR HFA/PROVENTIL HFA/VENTOLIN HFA) 108 (90 Base) MCG/ACT inhaler     calcium carbonate 500 mg-vitamin D 200 units (OSCAL WITH D;OYSTER SHELL CALCIUM) 500-200 MG-UNIT per tablet     guaiFENesin-codeine (ROBITUSSIN AC) 100-10 MG/5ML solution     naproxen (NAPROSYN) 500 MG tablet     ORDER FOR DME     sertraline (ZOLOFT) 100 MG tablet     tamsulosin (FLOMAX) 0.4 MG capsule     Vitamin D3 (VITAMIN D3) 25 mcg (1000 units) tablet     No current facility-administered medications for this visit.      No Known Allergies  Social History     Socioeconomic History     Marital status:      Spouse name: Not on file     Number of children: 4     Years of education: Not on file     Highest education level: Not on file   Occupational History     Employer: UNEMPLOYED   Social Needs     Financial resource strain: Not on file     Food insecurity:      Worry: Not on file     Inability: Not on file     Transportation needs:     Medical: Not on file     Non-medical: Not on file   Tobacco Use     Smoking status: Former Smoker     Packs/day: 2.00     Years: 40.00     Pack years: 80.00     Types: Cigarettes     Start date: 2/3/1960     Last attempt to quit: 2001     Years since quittin.4     Smokeless tobacco: Never Used   Substance and Sexual Activity     Alcohol use: No     Alcohol/week: 0.0 standard drinks     Drug use: No     Sexual activity: Never     Partners: Female   Lifestyle     Physical activity:     Days per week: Not on file     Minutes per session: Not on file     Stress: Not on file   Relationships     Social connections:     Talks on phone: Not on file     Gets together: Not on file     Attends Sabianist service: Not on file     Active member of club or organization: Not on file     Attends meetings of clubs or organizations: Not on file     Relationship status: Not on file     Intimate partner violence:     Fear of current or ex partner: Not on file     Emotionally abused: Not on file     Physically abused: Not on file     Forced sexual activity: Not on file   Other Topics Concern      Service Not Asked     Blood Transfusions No     Caffeine Concern No     Occupational Exposure No     Hobby Hazards No     Sleep Concern No     Stress Concern No     Weight Concern No     Special Diet No     Back Care Yes     Comment: LBP- sciatica      Exercise Yes     Comment: walking      Bike Helmet Not Asked     Seat Belt Yes     Self-Exams Not Asked     Parent/sibling w/ CABG, MI or angioplasty before 65F 55M? 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      Past Medical History:   Diagnosis Date     Depression      Depressive disorder      Mixed incontinence 2016     Myalgia      KULWANT on CPAP      Postnasal drip      ROTATOR CUFF (CAPSULE)  SPRAIN      Past Surgical History:   Procedure Laterality Date     COLONOSCOPY       COLONOSCOPY N/A 12/6/2018    Procedure: COLONOSCOPY;  Surgeon: Brent Jeffries MD;  Location:  GI     Family History   Problem Relation Age of Onset     Unknown/Adopted Mother         most family members lived to 80s but no medical condition known     Unknown/Adopted Father      Colon Cancer No family hx of      Lung Cancer No family hx of        Exam:   BP (!) 153/75   Pulse 64   Temp 97.2  F (36.2  C) (Tympanic)   Resp 16   Wt 57.6 kg (127 lb)   SpO2 95%   BMI 19.89 kg/m     GENERAL APPEARANCE: Well developed, well nourished, alert, and in no apparent distress.  EYES: PERRL, EOMI  HENT: Nasal mucosa with no edema and no hyperemia. No nasal polyps.  EARS: Canals clear, TMs normal  MOUTH: Oral mucosa is moist, without any lesions, no tonsillar enlargement, no oropharyngeal exudate.  NECK: supple, no masses, no thyromegaly.  LYMPHATICS: No significant axillary, cervical, or supraclavicular nodes.  RESP: normal percussion, good air flow throughout.  No crackles. No rhonchi. No wheezes.  CV: Normal S1, S2, regular rhythm, normal rate. No murmur.  No rub. No gallop. No LE edema.   ABDOMEN:  Bowel sounds normal, soft, nontender, no HSM or masses.   MS: extremities normal. No clubbing. No cyanosis.  SKIN: no rash on limited exam  NEURO: Mentation intact, speech normal, normal strength and tone, normal gait and stance  PSYCH: mentation appears normal. and affect normal/bright  Results:  - My interpretation of the images relevant for this visit includes: CT chest today shows improvement in RUL and RLL ill defined nodular opacities seen previously  - My interpretation of the PFT's relevant for this visit includes: None     Assessment and plan: Man is a 73 yo [place] male referred for abnormal lung screening, lung nodules. He has KULWANT on CPAP and cough.   Lung nodule - seen on screening CT, ill-defined and bilobar. He has a cough  since October as well. Nonproductive. High risk for lung cancer but not eligible for screening by virtue of quit time.    Improved on follow up. No additional follow up recommended    Cough - suspect post nasal drainage or GERD. He has had it for a few months only, could be postviral   Empiric steroids prednisone x 5 days.   Benadryl 25mg nightly  Follow up with Dr Sun for cough      Again, thank you for allowing me to participate in the care of your patient.        Sincerely,        Maite Mishra MD

## 2020-03-02 NOTE — PROGRESS NOTES
[facility]od[facility] Initial Visit    Reason for Visit  Josh Gonzalez is a 74 year old male who is referred by Corinne for abnormal lung cancer screening   Pulmonary HPI    Since our last visit, he took the Augmentin for 8 days. He didn't notice improvement in symptoms. He is getting a new CPAP machine.    Cough is worse when lying down at night, not much cough during the day. He has clear saliva, not otherwise productive. The cough doesn't bother him but bothers his wife at night.     ROS Pulmonary  Dyspnea: No, Cough: Yes, Chest pain: No, Wheezing: No, Sputum Production: No, Hemoptysis: No  A complete ROS was otherwise negative except as noted in the HPI.  The patient was seen and examined by Maite Mishra MD   Current Outpatient Medications   Medication     acetaminophen (TYLENOL) 325 MG tablet     albuterol (PROAIR HFA/PROVENTIL HFA/VENTOLIN HFA) 108 (90 Base) MCG/ACT inhaler     calcium carbonate 500 mg-vitamin D 200 units (OSCAL WITH D;OYSTER SHELL CALCIUM) 500-200 MG-UNIT per tablet     guaiFENesin-codeine (ROBITUSSIN AC) 100-10 MG/5ML solution     naproxen (NAPROSYN) 500 MG tablet     ORDER FOR DME     sertraline (ZOLOFT) 100 MG tablet     tamsulosin (FLOMAX) 0.4 MG capsule     Vitamin D3 (VITAMIN D3) 25 mcg (1000 units) tablet     No current facility-administered medications for this visit.      No Known Allergies  Social History     Socioeconomic History     Marital status:      Spouse name: Not on file     Number of children: 4     Years of education: Not on file     Highest education level: Not on file   Occupational History     Employer: UNEMPLOYED   Social Needs     Financial resource strain: Not on file     Food insecurity:     Worry: Not on file     Inability: Not on file     Transportation needs:     Medical: Not on file     Non-medical: Not on file   Tobacco Use     Smoking status: Former Smoker     Packs/day: 2.00     Years: 40.00     Pack years: 80.00      Types: Cigarettes     Start date: 2/3/1960     Last attempt to quit: 2001     Years since quittin.4     Smokeless tobacco: Never Used   Substance and Sexual Activity     Alcohol use: No     Alcohol/week: 0.0 standard drinks     Drug use: No     Sexual activity: Never     Partners: Female   Lifestyle     Physical activity:     Days per week: Not on file     Minutes per session: Not on file     Stress: Not on file   Relationships     Social connections:     Talks on phone: Not on file     Gets together: Not on file     Attends Judaism service: Not on file     Active member of club or organization: Not on file     Attends meetings of clubs or organizations: Not on file     Relationship status: Not on file     Intimate partner violence:     Fear of current or ex partner: Not on file     Emotionally abused: Not on file     Physically abused: Not on file     Forced sexual activity: Not on file   Other Topics Concern      Service Not Asked     Blood Transfusions No     Caffeine Concern No     Occupational Exposure No     Hobby Hazards No     Sleep Concern No     Stress Concern No     Weight Concern No     Special Diet No     Back Care Yes     Comment: LBP- sciatica      Exercise Yes     Comment: walking      Bike Helmet Not Asked     Seat Belt Yes     Self-Exams Not Asked     Parent/sibling w/ CABG, MI or angioplasty before 65F 55M? 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      Past Medical History:   Diagnosis Date     Depression      Depressive disorder      Mixed incontinence 2016     Myalgia      KULWANT on CPAP      Postnasal drip      ROTATOR CUFF (CAPSULE) SPRAIN      Past Surgical History:   Procedure Laterality Date     COLONOSCOPY       COLONOSCOPY N/A 2018    Procedure: COLONOSCOPY;  Surgeon: Brent Jeffries MD;  Location:  GI     Family History   Problem Relation Age of Onset      Unknown/Adopted Mother         most family members lived to 80s but no medical condition known     Unknown/Adopted Father      Colon Cancer No family hx of      Lung Cancer No family hx of        Exam:   BP (!) 153/75   Pulse 64   Temp 97.2  F (36.2  C) (Tympanic)   Resp 16   Wt 57.6 kg (127 lb)   SpO2 95%   BMI 19.89 kg/m    GENERAL APPEARANCE: Well developed, well nourished, alert, and in no apparent distress.  EYES: PERRL, EOMI  HENT: Nasal mucosa with no edema and no hyperemia. No nasal polyps.  EARS: Canals clear, TMs normal  MOUTH: Oral mucosa is moist, without any lesions, no tonsillar enlargement, no oropharyngeal exudate.  NECK: supple, no masses, no thyromegaly.  LYMPHATICS: No significant axillary, cervical, or supraclavicular nodes.  RESP: normal percussion, good air flow throughout.  No crackles. No rhonchi. No wheezes.  CV: Normal S1, S2, regular rhythm, normal rate. No murmur.  No rub. No gallop. No LE edema.   ABDOMEN:  Bowel sounds normal, soft, nontender, no HSM or masses.   MS: extremities normal. No clubbing. No cyanosis.  SKIN: no rash on limited exam  NEURO: Mentation intact, speech normal, normal strength and tone, normal gait and stance  PSYCH: mentation appears normal. and affect normal/bright  Results:  - My interpretation of the images relevant for this visit includes: CT chest today shows improvement in RUL and RLL ill defined nodular opacities seen previously  - My interpretation of the PFT's relevant for this visit includes: None     Assessment and plan: Man is a 73 yo Chinese male referred for abnormal lung screening, lung nodules. He has KULWANT on CPAP and cough.   Lung nodule - seen on screening CT, ill-defined and bilobar. He has a cough since October as well. Nonproductive. High risk for lung cancer but not eligible for screening by virtue of quit time.    Improved on follow up. No additional follow up recommended    Cough - suspect post nasal drainage or GERD. He has had it  for a few months only, could be postviral   Empiric steroids prednisone x 5 days.   Benadryl 25mg nightly  Follow up with Dr Sun for cough

## 2020-03-02 NOTE — NURSING NOTE
"Oncology Rooming Note    March 2, 2020 12:29 PM   Man Yamilet Gonzalez is a 74 year old male who presents for:    Chief Complaint   Patient presents with     Lung Nodule     Follow up - results of CT     Initial Vitals: BP (!) 153/75   Pulse 64   Temp 97.2  F (36.2  C) (Tympanic)   Resp 16   Wt 57.6 kg (127 lb)   SpO2 95%   BMI 19.89 kg/m   Estimated body mass index is 19.89 kg/m  as calculated from the following:    Height as of 11/18/19: 1.702 m (5' 7\").    Weight as of this encounter: 57.6 kg (127 lb). Body surface area is 1.65 meters squared.  Mild Pain (3) Comment: Data Unavailable   No LMP for male patient.  Allergies reviewed: Yes  Medications reviewed: Yes    Medications: Medication refills not needed today.  Pharmacy name entered into Buyt.In:    [id number] - [place], [place] - [address]  [id number] [place], [place] - [address] [facility] [address]    Clinical concerns: follow up       Mara Woo CMA              "

## 2020-03-04 NOTE — PATIENT INSTRUCTIONS
No further follow up needed, per Dr. Danica Nina, RN, BSN, [facility]  Patient Care Coordinator  [facility] [place]  [phone]

## 2020-06-29 ENCOUNTER — PATIENT OUTREACH (OUTPATIENT)
Dept: GERIATRIC MEDICINE | Facility: CLINIC | Age: 75
End: 2020-06-29

## 2020-06-29 NOTE — PROGRESS NOTES
[facility] Care Coordination Contact    Called spouse Lissette to schedule annual HRA home visit for Josh Gonzalez. HRA has been scheduled for Monday, July 6th at 1 pm.. Cantonese  requested from FABIENNE.  VIRGINIA Durbin, [facility] Care Coordinator  Tel [phone]  Fax [phone]

## 2020-07-06 ENCOUNTER — PATIENT OUTREACH (OUTPATIENT)
Dept: GERIATRIC MEDICINE | Facility: CLINIC | Age: 75
End: 2020-07-06

## 2020-07-06 ASSESSMENT — ACTIVITIES OF DAILY LIVING (ADL)
DEPENDENT_IADLS:: CLEANING;COOKING;LAUNDRY;SHOPPING;MEAL PREPARATION;MEDICATION MANAGEMENT;MONEY MANAGEMENT;TRANSPORTATION

## 2020-07-06 NOTE — PROGRESS NOTES
[facility] Care Coordination Contact    [facility] Home Visit Assessment     Home visit for Health Risk Assessment with Josh Gonzalez completed on July 6, 2020. Completed via telephone due to Covid 19.    Type of residence:: Private home - stairs  Current living arrangement:: I live in a private home with family     Assessment completed with:: Patient, Spouse or significant other    Current Care Plan  Member currently receiving the following home care services: none   Member currently receiving the following community resources: PCA, County Programs, Other (see comment)(Adult Day Services)    Medication Review  Medication reconciliation completed in Epic: No Not completed-telephone assessment due to Covid 19 and neither member or wife was able to give names of medications to Care Coordinator.  Medication set-up & administration: Family/informal caregiver sets up weekly.  Family caregiver administers medications.  Medication Risk Assessment Medication (1 or more, place referral to MTM): N/A: No risk factors identified  MTM Referral Placed: No: No risk factors idenified    Mental/Behavioral Health   Depression Screening:   PHQ-2 Total Score (Adult) - Positive if 3 or more points; Administer PHQ-9 if positive: 2       Mental health DX:: Yes(depression F33.41)   Mental health DX how managed:: Medication    Falls Assessment:   Fallen 2 or more times in the past year?: No   Any fall with injury in the past year?: Yes    ADL/IADL Dependencies:   Dependent ADLs:: Ambulation-cane, Bathing, Dressing, Grooming, Transfers  Dependent IADLs:: Cleaning, Cooking, Laundry, Shopping, Meal Preparation, Medication Management, Money Management, Transportation    [facility] sponsored benefits: Shared information re: Silver Sneakers/gym memberships, ASA, Calcium +D.    PCA Assessment completed at visit: Yes Annual PCA assessment indicated 12 units per day of PCA. This is the same as the previous assessment.     Elderly  Waiver Eligibility: Yes-will continue on EW.    Care Plan & Recommendations: Continue PCA, ADC,ADC transportation, and Ensure. ADC currently closed due to Covid 19. Member will call Care Coordinator when he is ready to go out for a hearing eval and Care Coordinator will provide him with provider options.    See CC for detailed assessment information.    Follow-Up Plan: Member informed of future contact, plan to f/u with member with a 6 month telephone assessment.  Contact information shared with member and family, encouraged member to call with any questions or concerns at any time.    [place] care continuum providers: Please refer to Health Care Home on the [facility] Problem List to view this patient's [facility] Care Plan Summary.  VIRGINIA Durbin, [facility] Care Coordinator  Tel [phone]  Fax [phone]

## 2020-07-07 ENCOUNTER — PATIENT OUTREACH (OUTPATIENT)
Dept: GERIATRIC MEDICINE | Facility: CLINIC | Age: 75
End: 2020-07-07

## 2020-07-07 NOTE — LETTER
July 7, 2020    Important [employer] Information    MAN PIU SG  [address] [address]  Your Care Plan  Dear Man,  When we spoke recently, I promised to send you a Care Plan. The plan enclosed is a summary of our discussion. It includes the steps we agreed would help you meet your health goals. In addition, I can help you with:  Iqnsfrr-X-LlpfSE  This program is available to members who need a ride to medical and dental visits. To schedule a ride, call [phone] or [phone] (toll free). TTY/TTD: 711. You can call 8 a.m. to 8 p.m. Seven days a week. Access to a representative may be limited at times.    Proposify   The Proposify program empowers you to improve your health through education and exercise. To learn more, visit [employer]Wisdom, or call [employer] at [phone] (toll free) (TTY:711) from 7 a.m. - 7 p.m. Central Time, Monday-Friday.  Health Care Directive   This form helps you outline your health care wishes. You can request a form from me and I will answer any questions you have before you discuss it with your doctor.   Annual Physical  Take a key step on your path to good health and set up an annual physical at your clinic.  Questions?  Call me at [phone] Monday-Friday between 8am and 5pm.  TTY/TTD: 711. As we discussed, I plan to be in touch with you again in 6 months to follow up via phone.  Sincerely,    VIRGINIA Durbin, [facility]    E-mail: [email or web address]  Phone:[phone]      [employer]          cc: member records                    Civil Rights Notice  Discrimination is against the law. [employer] does not discriminate on the basis of any of the following:    Race    Color    National Origin    Creed    Orthodoxy    Age    Public Assistance Status    Receipt of Health Care Services    Disability (including physical or mental impairment)    Sex (including sex stereotypes and gender identity)    Marital  Status    Political Beliefs    Medical Condition    Genetic Information    Sexual Orientation    Claims Experience    Medical History    Health Status    Auxiliary Aids and Services:  [employer] provides auxiliary aids and services, like qualified interpreters or information in accessible formats, free of charge and in a timely manner, to ensure an equal opportunity to participate in our health care programs. Contact [employer] Customer Service at ItrybeforeIbuy/contact medicaid or call [phone] (toll free); TTY:711 or at ItrybeforeIbuy/contactmedicaid.    Language Assistance Services:  [employer] provides translated documents and spoken language interpreting, free of charge and in a timely manner, when language assistance services are necessary to ensure limited English speakers have meaningful access to our information and services. Contact [employer] at -[phone] (toll free); TTY: 090 or ItrybeforeIbuy/contactmedicaid.     Civil Rights Complaints  You have the right to file a discrimination complaint if you believe you were treated in a discriminatory way by [employer]. You may contact any of the following four agencies directly to file a discrimination complaint.    U.S. Department of Health and Human Services  Office for Civil Rights (OCR)  You have the right to file a complaint with the OCR, a federal agency, if you believe you have been discriminated against because of any of the following:    Race    Disability    Color    Sex    National Origin    Age      Contact the OCR directly to file a complaint:         Director         U.S. Department of Health and Human Services  Office for Civil Rights         [address] [place] [address], [place] [address]         [phone] (Voice)         [phone] (TDD)         Complaint Portal - https://ocrportal.hhs.gov/ocr/portal/lobby.jsf     [place] Department of Human Rights ([facility])  In [place], you have the right to file a complaint with  the MDHR if you believe you have been discriminated against because of any of the following:      Race    Color    National Origin    Pentecostal    Creed    Sex    Sexual Orientation    Marital Status    Public Assistance Status    Disability    Contact the MDHR directly to file a complaint:         [place] Department of Human Rights         [facility], [address] [place] [address]         [phone] (voice)          [phone] (toll free)         711 or [phone] ([place] Relay)         [phone] (Fax)         [email or web address] (Email)     [place] Department of Human Services (DHS)  You have the right to file a complaint with [facility] if you believe you have been discriminated against in our health care programs because of any of the following:    Race    Color    National Origin    Creed    Pentecostal    Age    Public Assistance Status    Receipt of Health Care Services    Disability (including physical or mental impairment)    Sex (including sex stereotypes and gender identity)    Marital Status    Political Beliefs    Medical Condition    Genetic Information    Sexual Orientation    Claims Experience    Medical History    Health Status    Complaints must be in writing and filed within 180 days of the date you discovered the alleged discrimination. The complaint must contain your name and address and describe the discrimination you are complaining about. After we get your complaint, we will review it and notify you in writing about whether we have authority to investigate. If we do, we will investigate the complaint.      [facility] will notify you in writing of the investigation s outcome. You have a right to appeal the outcome if you disagree with the decision. To appeal, you must send a written request to have [facility] review the investigation outcome period. Be brief and state why you disagree with the decision. Include additional information you think is important.      If you file a  complaint in this way, the people who work for the agency named in the complaint cannot retaliate against you. This means they cannot punish you in any way for filing a complaint. Filing a complaint in this way does not stop you from seeking out other legal or administration actions.     Contact DHS directly to file a discrimination complaint:        Civil Rights Coordinator        [facility]        Equal Opportunity and Access Division        [address] [address]        [place] [address] [phone] (voice) or use your preferred relay service     [employer] Complaint Notice   You have the right to file a complaint with [employer] if you believe you have been discriminated against because of any of the following:       Medical condition    Health status    Receipt of health care services    Claims experience    Medical history    Genetic information    Disability (including mental or physical impairment)    Marital status    Age    Sex (including sex stereotypes and gender identity)    Sexual orientation    National origin    Race    Color    Moravian    Creed    Public assistance status    Political beliefs    You can file a complaint and ask for help in filing a complaint in person or by mail, phone, fax, or email at:     [employer] Civil Rights Coordinator  [employer] [facility]  [address], [place] [address] [phone] (voice and fax) or TTY:138  Email: [email or web address]    American Indians can continue to use [place] and [place] of [place] Health Services (IHS) clinics. We will not require prior approval or impose any conditions for you to get services at these clinics. For elders age 65 years and older this includes Elderly Waiver (EW) services accessed through the [place]. If a doctor or other provider in a [facility] or IHS clinic refers you to a provider in our network, we will not require you to see your primary care provider prior to the referral.    For  accessible formats of this publication or assistance with equal or access to our services, visit FreeDrive[employer]/contactmedicaid, or call [phone] (toll free) or use your preferred relay service.

## 2020-07-07 NOTE — PROGRESS NOTES
[facility] Care Coordination Contact    Received after visit chart from care coordinator.  Completed following tasks: Mailed copy of care plan to client, Updated services in access and Submitted referrals/auths for ADC and ADC transportation.  Ensure is MA - no need for auth.  , Provider Signature - No POC Shared:  Member indicates that they do not want their POC shared with any EW providers.    [employer]:  Faxed completed PCA assessment to PCA Agency and mailed copies to member.  Faxed MD Communication to PCP.  Emailed referral form for auth to [employer].    Member was mailed copies of the PCA and POC signature sheets to sign and return mail with a SASE.  This assessment was completed telephonically due to Covid-19.    Suly Dyer  Care Management Specialist  [facility]  [phone]

## 2020-07-22 NOTE — PROGRESS NOTES
[facility] Care Coordination Contact    PCA signature sheet and POC signature sheet has not been returned.  Care Coordinator called daughter Yogi and she will check with member and have him sign and return it.  Told her to call Care Coordinator if he needs another copy.  VIRGINIA Durbin, [facility] Care Coordinator  Tel [phone]  Fax [phone]

## 2020-08-07 ENCOUNTER — TELEPHONE (OUTPATIENT)
Dept: INTERNAL MEDICINE | Facility: CLINIC | Age: 75
End: 2020-08-07

## 2020-11-12 DIAGNOSIS — G89.29 CHRONIC PAIN OF BOTH SHOULDERS: ICD-10-CM

## 2020-11-12 DIAGNOSIS — M17.0 PRIMARY OSTEOARTHRITIS OF BOTH KNEES: ICD-10-CM

## 2020-11-12 DIAGNOSIS — M25.512 CHRONIC PAIN OF BOTH SHOULDERS: ICD-10-CM

## 2020-11-12 DIAGNOSIS — M25.511 CHRONIC PAIN OF BOTH SHOULDERS: ICD-10-CM

## 2020-11-12 RX ORDER — VITAMIN B COMPLEX
TABLET ORAL
Qty: 90 TABLET | Refills: 0 | Status: SHIPPED | OUTPATIENT
Start: 2020-11-12 | End: 2021-03-09

## 2020-11-12 NOTE — TELEPHONE ENCOUNTER
Medication is being filled for 1 time refill only due to:  Patient needs to be seen because needs appt.    Routing to MA/TC pool. The Pt is due for a visit with PCP

## 2020-11-12 NOTE — LETTER
[facility]  [address]  [facility] [address]  [phone]        November 20, 2020      Josh Gonzalez  [address] [address] [address]          Dear Josh Gonzalez    APPOINTMENT REMINDER:   Our records indicates that it is time for you to be seen for a recheck.     Your current medication request will be approved for one refill but you will need to be seen before any additional refills can be approved.    Taking care of your health is important to us, and ongoing visits with your provider are vital to your care.    We look forward to seeing you in the near future.  You may call our office at [phone] to schedule a visit.    Please disregard this notice if you have already made an appointment.      Sincerely,      [facility]

## 2020-12-01 ENCOUNTER — PATIENT OUTREACH (OUTPATIENT)
Dept: GERIATRIC MEDICINE | Facility: CLINIC | Age: 75
End: 2020-12-01

## 2020-12-01 NOTE — PROGRESS NOTES
[facility] Care Coordination Contact      [facility] Six-Month Telephone Assessment    6 month telephone assessment completed on 12/1/20 with daughter Yogi.    ER visits: No  Hospitalizations: No  TCU stays: No  Significant health status changes: no  Falls/Injuries: No  ADL/IADL changes: No  Changes in services: No    Caregiver Assessment follow up:  NA    Goals: See POC in chart for goal progress documentation.      Will see member in 6 months for an annual health risk assessment.   Encouraged member to call CC with any questions or concerns in the meantime.     VIRGINIA Durbin, [facility] Care Coordinator  Tel [phone]  Fax [phone]

## 2020-12-01 NOTE — PROGRESS NOTES
[facility] Care Coordination Contact    Called adult daughter Yogi to complete six month assessment and left a message requesting a return call.  VIRGINIA Durbin, [facility] Care Coordinator  Tel [phone]  Fax [phone]

## 2021-02-17 ENCOUNTER — IMMUNIZATION (OUTPATIENT)
Dept: NURSING | Facility: CLINIC | Age: 76
End: 2021-02-17
Payer: COMMERCIAL

## 2021-02-17 PROCEDURE — 91300 PR COVID VAC PFIZER DIL RECON 30 MCG/0.3 ML IM: CPT

## 2021-02-17 PROCEDURE — 0001A PR COVID VAC PFIZER DIL RECON 30 MCG/0.3 ML IM: CPT

## 2021-03-06 DIAGNOSIS — G89.29 CHRONIC PAIN OF BOTH SHOULDERS: ICD-10-CM

## 2021-03-06 DIAGNOSIS — M25.511 CHRONIC PAIN OF BOTH SHOULDERS: ICD-10-CM

## 2021-03-06 DIAGNOSIS — M17.0 PRIMARY OSTEOARTHRITIS OF BOTH KNEES: ICD-10-CM

## 2021-03-06 DIAGNOSIS — M25.512 CHRONIC PAIN OF BOTH SHOULDERS: ICD-10-CM

## 2021-03-09 RX ORDER — CHOLECALCIFEROL (VITAMIN D3) 25 MCG
TABLET ORAL
Qty: 90 TABLET | Refills: 0 | Status: SHIPPED | OUTPATIENT
Start: 2021-03-09 | End: 2021-06-14

## 2021-03-09 NOTE — TELEPHONE ENCOUNTER
Routing refill request to provider for review/approval because:  Margo given x1 and patient did not follow up, please advise  Patient needs to be seen because it has been more than 1 year since last office visit.

## 2021-03-10 ENCOUNTER — IMMUNIZATION (OUTPATIENT)
Dept: NURSING | Facility: CLINIC | Age: 76
End: 2021-03-10
Attending: INTERNAL MEDICINE
Payer: COMMERCIAL

## 2021-03-10 PROCEDURE — 0002A PR COVID VAC PFIZER DIL RECON 30 MCG/0.3 ML IM: CPT

## 2021-03-10 PROCEDURE — 91300 PR COVID VAC PFIZER DIL RECON 30 MCG/0.3 ML IM: CPT

## 2021-05-24 ENCOUNTER — PATIENT OUTREACH (OUTPATIENT)
Dept: GERIATRIC MEDICINE | Facility: CLINIC | Age: 76
End: 2021-05-24

## 2021-05-24 ENCOUNTER — TELEPHONE (OUTPATIENT)
Dept: INTERNAL MEDICINE | Facility: CLINIC | Age: 76
End: 2021-05-24

## 2021-05-24 NOTE — PROGRESS NOTES
[facility] Care Coordination Contact    Spoke with daughter Yogi by phone and scheduled member's annual HRA and PCA assessment for Tuesday, June 1 at 2 pm. Told her that Care Coordinator will have an  for the telephone visit.  IVRGINIA Durbin, [facility] Care Coordinator  Tel [phone]  Fax [phone]  Cell [phone]

## 2021-05-24 NOTE — PROGRESS NOTES
[facility] Care Coordination Contact  Received call from daughter Yogi requesting information on a new apartment building being built at [place] [address] [address] in [place].  Looked up information on this new construction and this is not a senior building and it is market rate construction (not subsidized).  Told her that [employer] (next to it) has subsidized apartments and she could call to find out how to get on waiting list.  Also told her that the majority of subsidized senior housing is through [facility] and her parents would need to complete application for that waiting list.  Told her that wait is usually over 1 year.  She will call back if she needs more information.  VIRGINIA Durbin, [facility] Care Coordinator  Tel [phone]  Fax [phone]  Cell [phone]

## 2021-06-01 ENCOUNTER — PATIENT OUTREACH (OUTPATIENT)
Dept: GERIATRIC MEDICINE | Facility: CLINIC | Age: 76
End: 2021-06-01

## 2021-06-02 SDOH — SOCIAL STABILITY: SOCIAL NETWORK: HOW OFTEN DO YOU GET TOGETHER WITH FRIENDS OR RELATIVES?: MORE THAN THREE TIMES A WEEK

## 2021-06-02 SDOH — SOCIAL STABILITY: SOCIAL NETWORK: IN A TYPICAL WEEK, HOW MANY TIMES DO YOU TALK ON THE PHONE WITH FAMILY, FRIENDS, OR NEIGHBORS?: THREE TIMES A WEEK

## 2021-06-02 SDOH — HEALTH STABILITY: MENTAL HEALTH
STRESS IS WHEN SOMEONE FEELS TENSE, NERVOUS, ANXIOUS, OR CAN'T SLEEP AT NIGHT BECAUSE THEIR MIND IS TROUBLED. HOW STRESSED ARE YOU?: TO SOME EXTENT

## 2021-06-02 SDOH — SOCIAL STABILITY: SOCIAL NETWORK: HOW OFTEN DO YOU ATTENT MEETINGS OF THE CLUB OR ORGANIZATION YOU BELONG TO?: NEVER

## 2021-06-02 SDOH — ECONOMIC STABILITY: TRANSPORTATION INSECURITY
IN THE PAST 12 MONTHS, HAS LACK OF TRANSPORTATION KEPT YOU FROM MEETINGS, WORK, OR FROM GETTING THINGS NEEDED FOR DAILY LIVING?: NO

## 2021-06-02 SDOH — SOCIAL STABILITY: SOCIAL NETWORK: HOW OFTEN DO YOU ATTEND CHURCH OR RELIGIOUS SERVICES?: NEVER

## 2021-06-02 SDOH — ECONOMIC STABILITY: TRANSPORTATION INSECURITY
IN THE PAST 12 MONTHS, HAS THE LACK OF TRANSPORTATION KEPT YOU FROM MEDICAL APPOINTMENTS OR FROM GETTING MEDICATIONS?: NO

## 2021-06-02 SDOH — HEALTH STABILITY: PHYSICAL HEALTH: ON AVERAGE, HOW MANY DAYS PER WEEK DO YOU ENGAGE IN MODERATE TO STRENUOUS EXERCISE (LIKE A BRISK WALK)?: 0 DAYS

## 2021-06-02 SDOH — SOCIAL STABILITY: SOCIAL NETWORK
DO YOU BELONG TO ANY CLUBS OR ORGANIZATIONS SUCH AS CHURCH GROUPS UNIONS, FRATERNAL OR ATHLETIC GROUPS, OR SCHOOL GROUPS?: NOT ASKED

## 2021-06-02 SDOH — HEALTH STABILITY: PHYSICAL HEALTH: ON AVERAGE, HOW MANY MINUTES DO YOU ENGAGE IN EXERCISE AT THIS LEVEL?: 0 MIN

## 2021-06-02 NOTE — PROGRESS NOTES
[facility] Care Coordination Contact    [facility] Home Visit Assessment     Home visit for Health Risk Assessment with Josh Gonzalez completed on June 1, 2021.  Assessment completed by phone due to Covid 19.    Type of residence:: Private home - stairs  Current living arrangement:: I live in a private home with family     Assessment completed with:: Patient, Spouse or significant other    Current Care Plan  Member currently receiving the following home care services:   none  Member currently receiving the following community resources: PCA, County Programs, Adult Day Services  Also gets Ensure monthly      Medication Review  Medication reconciliation completed in Epic: No  Due to assessment being done by telephone.  Medication set-up & administration: Family/informal caregiver sets up weekly.  Reminders from family to take meds.  Medication Risk Assessment Medication (1 or more, place referral to MTM): N/A: No risk factors identified  MTM Referral Placed: No: No risk factors idenified    Mental/Behavioral Health   Depression Screening:   PHQ-2 Total Score (Adult) - Positive if 3 or more points; Administer PHQ-9 if positive: 2       Mental health DX:: Yes(depression)   Mental health DX how managed:: Medication    Falls Assessment:   Fallen 2 or more times in the past year?: No   Any fall with injury in the past year?: No    ADL/IADL Dependencies:   Dependent ADLs:: Ambulation-cane, Bathing, Dressing, Grooming, Transfers  Dependent IADLs:: Cleaning, Cooking, Laundry, Shopping, Meal Preparation, Medication Management, Money Management, Transportation    [facility] Plan sponsored benefits: Shared information re: Silver Sneakers/gym memberships, ASA, Calcium +D.    PCA Assessment completed at visit: Yes Annual PCA assessment indicated 12 units per day of PCA. This is the same as the previous assessment.     Elderly Waiver Eligibility: Yes-will continue on EW    Care Plan & Recommendations: Continue PCA,  Adult Day Services, and Ensure.    See [facility] for detailed assessment information.    Follow-Up Plan: Member informed of future contact, plan to f/u with member with a 6 month telephone assessment.  Contact information shared with member and family, encouraged member to call with any questions or concerns at any time.    [place] care continuum providers: Please refer to Health Care Home on the [facility] Problem List to view this patient's [facility] Care Plan Summary.  VIRGINIA Durbin, [facility] Care Coordinator  Tel [phone]  Fax [phone]  Cell [phone]

## 2021-06-08 ENCOUNTER — PATIENT OUTREACH (OUTPATIENT)
Dept: GERIATRIC MEDICINE | Facility: CLINIC | Age: 76
End: 2021-06-08

## 2021-06-08 NOTE — LETTER
[facility]  [address], [address]  [place] [address]  Phone:  [phone]  Fax:  [phone]        June 8, 2021    Yogi Oneal  [address] [address]      Dear Yogi,     Enclosed is a caregiver assessment as you are a person who provides assistance to [facility] on a regular basis.    Please complete and return the assessment in the self-addressed stamped envelope provided at your earliest convenience.  If you have questions about the form, please feel free to call me at [phone].  Thank you.    Sincerely,    VIRGINIA Durbin, [facility]    E-mail: [email or web address]  Phone:[phone]      LeonaLinkyt            Enclosures:   Caregiver assessment     Self-addressed stamped envelope

## 2021-06-08 NOTE — PROGRESS NOTES
[facility] Care Coordination Contact    Received after visit chart from care coordinator.  Completed following tasks: Mailed copy of care plan to client, Updated services in access and Submitted referrals/auths for ADC.     Provider Signature - No POC Shared:  Member indicates that they do not want their POC shared with any EW providers.    Per CC, mailed LTCC caregiver assessment to Yogi Oneal along with self-addressed, stamped return envelope.    [employer]:  Faxed completed PCA assessment to PCA Agency and mailed copies to member.  Faxed MD Communication to PCP.  Emailed referral form for auth to [employer].    Member was mailed copies of the POC and PCA signature sheets to sign and return mail with a SASE.  This assessment was completed telephonically due to Covid-19.    Suly Dyer  Care Management Specialist  [facility]  [phone]

## 2021-06-08 NOTE — LETTER
June 8, 2021    Important [employer] Information    [facility]  [address] [address]  Your Care Plan  Dear Man,  When we spoke recently, I promised to send you a Care Plan. The plan enclosed is a summary of our discussion. It includes the steps we agreed would help you meet your health goals. In addition, I can help you with:  Ynvgcpc-Z-YbfaMM  This program is available to members who need a ride to medical and dental visits. To schedule a ride, call [phone] or [phone] (toll free). TTY/TTD: 711. You can call 8 a.m. to 8 p.m. Seven days a week. Access to a representative may be limited at times.    [employer]   The N[employer] program empowers you to improve your health through education and exercise. To learn more, visit [employer], or call [employer] at [phone] (toll free) (TTY:711) from 7 a.m. - 7 p.m. Central Time, Monday-Friday.  Health Care Directive   This form helps you outline your health care wishes. You can request a form from me and I will answer any questions you have before you discuss it with your doctor.   Annual Physical  Take a key step on your path to good health and set up an annual physical at your clinic.  Questions?  Call me at [phone] Monday-Friday between 8am and 5pm.  TTY/TTD: 711. As we discussed, I plan to be in touch with you again in 6 months to follow up via phone.  Sincerely,    VIRGINIA Durbin, [facility]    E-mail: [email or web address]  Phone:[phone]      Frankly          cc: member records                    Civil Rights Notice  Discrimination is against the law. [employer] does not discriminate on the basis of any of the following:    Race    Color    National Origin    Creed    Buddhism    Age    Public Assistance Status    Receipt of Health Care Services    Disability (including physical or mental impairment)    Sex (including sex stereotypes and gender identity)    Marital  Status    Political Beliefs    Medical Condition    Genetic Information    Sexual Orientation    Claims Experience    Medical History    Health Status    Auxiliary Aids and Services:  [employer] provides auxiliary aids and services, like qualified interpreters or information in accessible formats, free of charge and in a timely manner, to ensure an equal opportunity to participate in our health care programs. Contact [employer] Customer Service at [employer]/contact medicaid or call [phone] (toll free); TTY:711 or at [employer]/contactmedicaid.    Language Assistance Services:  [employer] provides translated documents and spoken language interpreting, free of charge and in a timely manner, when language assistance services are necessary to ensure limited English speakers have meaningful access to our information and services. Contact [employer] at -[phone] (toll free); TTY: 787 or [employer]/contactmedicaid.     Civil Rights Complaints  You have the right to file a discrimination complaint if you believe you were treated in a discriminatory way by [employer]. You may contact any of the following four agencies directly to file a discrimination complaint.    U.S. Department of Health and Human Services  Office for Civil Rights (OCR)  You have the right to file a complaint with the OCR, a federal agency, if you believe you have been discriminated against because of any of the following:    Race    Disability    Color    Sex    National Origin    Age      Contact the OCR directly to file a complaint:         Director         U.S. Department of Health and Human Services  Office for Civil Rights         [address] [place] [address], [place] [address]         [phone] (Voice)         [phone] (TDD)         Complaint Portal - https://ocrportal.hhs.gov/ocr/portal/lobby.jsf     [place] Department of Human Rights ([facility])  In [place], you have the right to file a complaint with  the MDHR if you believe you have been discriminated against because of any of the following:      Race    Color    National Origin    Catholic    Creed    Sex    Sexual Orientation    Marital Status    Public Assistance Status    Disability    Contact the MDHR directly to file a complaint:         [place] Department of Human Rights         [facility], [address] [place] [address]         [phone] (voice)          [phone] (toll free)         711 or [phone] ([place] Relay)         [phone] (Fax)         [email or web address]. (Email)     [place] Department of Human Services (DHS)  You have the right to file a complaint with [facility] if you believe you have been discriminated against in our health care programs because of any of the following:    Race    Color    National Origin    Creed    Catholic    Age    Public Assistance Status    Receipt of Health Care Services    Disability (including physical or mental impairment)    Sex (including sex stereotypes and gender identity)    Marital Status    Political Beliefs    Medical Condition    Genetic Information    Sexual Orientation    Claims Experience    Medical History    Health Status    Complaints must be in writing and filed within 180 days of the date you discovered the alleged discrimination. The complaint must contain your name and address and describe the discrimination you are complaining about. After we get your complaint, we will review it and notify you in writing about whether we have authority to investigate. If we do, we will investigate the complaint.      [facility] will notify you in writing of the investigation s outcome. You have a right to appeal the outcome if you disagree with the decision. To appeal, you must send a written request to have [facility] review the investigation outcome period. Be brief and state why you disagree with the decision. Include additional information you think is important.      If you file a  complaint in this way, the people who work for the agency named in the complaint cannot retaliate against you. This means they cannot punish you in any way for filing a complaint. Filing a complaint in this way does not stop you from seeking out other legal or administration actions.     Contact DHS directly to file a discrimination complaint:        Civil Rights Coordinator        [facility] of Human Services        Equal Opportunity and Access Division        [address] [address]        [place] [address] [phone] (voice) or use your preferred relay service     [employer] Complaint Notice   You have the right to file a complaint with [employer] if you believe you have been discriminated against because of any of the following:       Medical condition    Health status    Receipt of health care services    Claims experience    Medical history    Genetic information    Disability (including mental or physical impairment)    Marital status    Age    Sex (including sex stereotypes and gender identity)    Sexual orientation    National origin    Race    Color    Nondenominational    Creed    Public assistance status    Political beliefs    You can file a complaint and ask for help in filing a complaint in person or by mail, phone, fax, or email at:     [employer] Civil Rights Coordinator  [facility]  [address], [place] [address] [phone] (voice and fax) or TTU:482  Email: [email or web address]    American Indians can continue to use [place] and [facility] ([facility]) clinics. We will not require prior approval or impose any conditions for you to get services at these clinics. For elders age 65 years and older this includes Elderly Waiver (EW) services accessed through the [place]. If a doctor or other provider in a [place] or [facility] clinic refers you to a provider in our network, we will not require you to see your primary care provider prior to the referral.    For  accessible formats of this publication or assistance with equal or access to our services, visit [email or web address]/contactmedicaid, or call [phone] (toll free) or use your preferred relay service.

## 2021-06-11 DIAGNOSIS — M17.0 PRIMARY OSTEOARTHRITIS OF BOTH KNEES: ICD-10-CM

## 2021-06-11 DIAGNOSIS — G89.29 CHRONIC PAIN OF BOTH SHOULDERS: ICD-10-CM

## 2021-06-11 DIAGNOSIS — M25.511 CHRONIC PAIN OF BOTH SHOULDERS: ICD-10-CM

## 2021-06-11 DIAGNOSIS — M25.512 CHRONIC PAIN OF BOTH SHOULDERS: ICD-10-CM

## 2021-06-14 ENCOUNTER — ANCILLARY PROCEDURE (OUTPATIENT)
Dept: GENERAL RADIOLOGY | Facility: CLINIC | Age: 76
End: 2021-06-14
Attending: INTERNAL MEDICINE
Payer: COMMERCIAL

## 2021-06-14 ENCOUNTER — OFFICE VISIT (OUTPATIENT)
Dept: INTERNAL MEDICINE | Facility: CLINIC | Age: 76
End: 2021-06-14
Payer: COMMERCIAL

## 2021-06-14 VITALS
OXYGEN SATURATION: 98 % | DIASTOLIC BLOOD PRESSURE: 70 MMHG | WEIGHT: 122.4 LBS | SYSTOLIC BLOOD PRESSURE: 130 MMHG | TEMPERATURE: 98 F | HEIGHT: 66 IN | RESPIRATION RATE: 12 BRPM | BODY MASS INDEX: 19.67 KG/M2 | HEART RATE: 69 BPM

## 2021-06-14 DIAGNOSIS — M25.561 PAIN IN BOTH KNEES, UNSPECIFIED CHRONICITY: ICD-10-CM

## 2021-06-14 DIAGNOSIS — E78.5 HYPERLIPIDEMIA LDL GOAL <160: ICD-10-CM

## 2021-06-14 DIAGNOSIS — M25.562 PAIN IN BOTH KNEES, UNSPECIFIED CHRONICITY: ICD-10-CM

## 2021-06-14 DIAGNOSIS — M17.0 PRIMARY OSTEOARTHRITIS OF BOTH KNEES: ICD-10-CM

## 2021-06-14 DIAGNOSIS — E11.9 TYPE 2 DIABETES MELLITUS WITHOUT COMPLICATION, WITHOUT LONG-TERM CURRENT USE OF INSULIN (H): ICD-10-CM

## 2021-06-14 DIAGNOSIS — F33.41 RECURRENT MAJOR DEPRESSIVE DISORDER, IN PARTIAL REMISSION (H): ICD-10-CM

## 2021-06-14 DIAGNOSIS — M54.50 BILATERAL LOW BACK PAIN WITHOUT SCIATICA, UNSPECIFIED CHRONICITY: ICD-10-CM

## 2021-06-14 DIAGNOSIS — R07.9 CHEST PAIN, UNSPECIFIED TYPE: ICD-10-CM

## 2021-06-14 DIAGNOSIS — R41.3 MEMORY IMPAIRMENT: ICD-10-CM

## 2021-06-14 DIAGNOSIS — R29.898 WEAKNESS OF BOTH LEGS: ICD-10-CM

## 2021-06-14 DIAGNOSIS — E55.9 VITAMIN D DEFICIENCY: ICD-10-CM

## 2021-06-14 DIAGNOSIS — Z00.00 ENCOUNTER FOR PREVENTATIVE ADULT HEALTH CARE EXAMINATION: Primary | ICD-10-CM

## 2021-06-14 DIAGNOSIS — Z12.5 SCREENING FOR PROSTATE CANCER: ICD-10-CM

## 2021-06-14 LAB
ALBUMIN UR-MCNC: NEGATIVE MG/DL
APPEARANCE UR: CLEAR
BILIRUB UR QL STRIP: NEGATIVE
COLOR UR AUTO: YELLOW
ERYTHROCYTE [DISTWIDTH] IN BLOOD BY AUTOMATED COUNT: 15.2 % (ref 10–15)
ERYTHROCYTE [SEDIMENTATION RATE] IN BLOOD BY WESTERGREN METHOD: 15 MM/H (ref 0–20)
GLUCOSE UR STRIP-MCNC: NEGATIVE MG/DL
HBA1C MFR BLD: 6 % (ref 0–5.6)
HCT VFR BLD AUTO: 44 % (ref 40–53)
HGB BLD-MCNC: 13.9 G/DL (ref 13.3–17.7)
HGB UR QL STRIP: NEGATIVE
KETONES UR STRIP-MCNC: NEGATIVE MG/DL
LEUKOCYTE ESTERASE UR QL STRIP: NEGATIVE
MCH RBC QN AUTO: 24.6 PG (ref 26.5–33)
MCHC RBC AUTO-ENTMCNC: 31.6 G/DL (ref 31.5–36.5)
MCV RBC AUTO: 78 FL (ref 78–100)
NITRATE UR QL: NEGATIVE
PH UR STRIP: 6.5 PH (ref 5–7)
PLATELET # BLD AUTO: 241 10E9/L (ref 150–450)
RBC # BLD AUTO: 5.66 10E12/L (ref 4.4–5.9)
SOURCE: NORMAL
SP GR UR STRIP: 1.01 (ref 1–1.03)
UROBILINOGEN UR STRIP-ACNC: 0.2 EU/DL (ref 0.2–1)
WBC # BLD AUTO: 6.7 10E9/L (ref 4–11)

## 2021-06-14 PROCEDURE — 73562 X-RAY EXAM OF KNEE 3: CPT | Mod: RT | Performed by: RADIOLOGY

## 2021-06-14 PROCEDURE — G0103 PSA SCREENING: HCPCS | Performed by: INTERNAL MEDICINE

## 2021-06-14 PROCEDURE — 83036 HEMOGLOBIN GLYCOSYLATED A1C: CPT | Performed by: INTERNAL MEDICINE

## 2021-06-14 PROCEDURE — 73562 X-RAY EXAM OF KNEE 3: CPT | Mod: LT | Performed by: RADIOLOGY

## 2021-06-14 PROCEDURE — 82607 VITAMIN B-12: CPT | Performed by: INTERNAL MEDICINE

## 2021-06-14 PROCEDURE — 99397 PER PM REEVAL EST PAT 65+ YR: CPT | Performed by: INTERNAL MEDICINE

## 2021-06-14 PROCEDURE — 82043 UR ALBUMIN QUANTITATIVE: CPT | Performed by: INTERNAL MEDICINE

## 2021-06-14 PROCEDURE — 82550 ASSAY OF CK (CPK): CPT | Performed by: INTERNAL MEDICINE

## 2021-06-14 PROCEDURE — 82306 VITAMIN D 25 HYDROXY: CPT | Performed by: INTERNAL MEDICINE

## 2021-06-14 PROCEDURE — 36415 COLL VENOUS BLD VENIPUNCTURE: CPT | Performed by: INTERNAL MEDICINE

## 2021-06-14 PROCEDURE — 81003 URINALYSIS AUTO W/O SCOPE: CPT | Performed by: INTERNAL MEDICINE

## 2021-06-14 PROCEDURE — 80061 LIPID PANEL: CPT | Performed by: INTERNAL MEDICINE

## 2021-06-14 PROCEDURE — 84443 ASSAY THYROID STIM HORMONE: CPT | Performed by: INTERNAL MEDICINE

## 2021-06-14 PROCEDURE — 99213 OFFICE O/P EST LOW 20 MIN: CPT | Mod: 25 | Performed by: INTERNAL MEDICINE

## 2021-06-14 PROCEDURE — 72100 X-RAY EXAM L-S SPINE 2/3 VWS: CPT | Performed by: RADIOLOGY

## 2021-06-14 PROCEDURE — 80053 COMPREHEN METABOLIC PANEL: CPT | Performed by: INTERNAL MEDICINE

## 2021-06-14 PROCEDURE — 85652 RBC SED RATE AUTOMATED: CPT | Performed by: INTERNAL MEDICINE

## 2021-06-14 PROCEDURE — 93000 ELECTROCARDIOGRAM COMPLETE: CPT | Performed by: INTERNAL MEDICINE

## 2021-06-14 PROCEDURE — 85027 COMPLETE CBC AUTOMATED: CPT | Performed by: INTERNAL MEDICINE

## 2021-06-14 RX ORDER — CHOLECALCIFEROL (VITAMIN D3) 25 MCG
1 TABLET ORAL DAILY
Qty: 90 TABLET | Refills: 1 | Status: SHIPPED | OUTPATIENT
Start: 2021-06-14 | End: 2021-12-13

## 2021-06-14 ASSESSMENT — ENCOUNTER SYMPTOMS
DIARRHEA: 0
CHILLS: 0
CONSTIPATION: 0
NERVOUS/ANXIOUS: 0
PARESTHESIAS: 0
PALPITATIONS: 0
DIZZINESS: 0
FEVER: 0
HEARTBURN: 0
HEMATURIA: 0
NAUSEA: 0
HEADACHES: 0
EYE PAIN: 0
HEMATOCHEZIA: 0
SORE THROAT: 0
MYALGIAS: 1
JOINT SWELLING: 0
COUGH: 0
SHORTNESS OF BREATH: 0
DYSURIA: 0
ARTHRALGIAS: 1
ABDOMINAL PAIN: 0
FREQUENCY: 0
WEAKNESS: 1

## 2021-06-14 ASSESSMENT — ACTIVITIES OF DAILY LIVING (ADL)
CURRENT_FUNCTION: LAUNDRY REQUIRES ASSISTANCE
CURRENT_FUNCTION: HOUSEWORK REQUIRES ASSISTANCE
CURRENT_FUNCTION: MEDICATION ADMINISTRATION REQUIRES ASSISTANCE
CURRENT_FUNCTION: PREPARING MEALS REQUIRES ASSISTANCE
CURRENT_FUNCTION: SHOPPING REQUIRES ASSISTANCE
CURRENT_FUNCTION: MONEY MANAGEMENT REQUIRES ASSISTANCE
CURRENT_FUNCTION: TRANSPORTATION REQUIRES ASSISTANCE

## 2021-06-14 ASSESSMENT — MIFFLIN-ST. JEOR: SCORE: 1227.95

## 2021-06-14 ASSESSMENT — PATIENT HEALTH QUESTIONNAIRE - PHQ9
SUM OF ALL RESPONSES TO PHQ QUESTIONS 1-9: 10
10. IF YOU CHECKED OFF ANY PROBLEMS, HOW DIFFICULT HAVE THESE PROBLEMS MADE IT FOR YOU TO DO YOUR WORK, TAKE CARE OF THINGS AT HOME, OR GET ALONG WITH OTHER PEOPLE: SOMEWHAT DIFFICULT
SUM OF ALL RESPONSES TO PHQ QUESTIONS 1-9: 10

## 2021-06-14 NOTE — PROGRESS NOTES
"SUBJECTIVE:   Man Yamilet Gonzalez is a 75 year old male who presents for Preventive Visit.      Patient has been advised of split billing requirements and indicates understanding: Yes   Are you in the first 12 months of your Medicare coverage?  No    Healthy Habits:     In general, how would you rate your overall health?  Poor    Frequency of exercise:  1 day/week    Duration of exercise:  Less than 15 minutes    Do you usually eat at least 4 servings of fruit and vegetables a day, include whole grains    & fiber and avoid regularly eating high fat or \"junk\" foods?  No    Medication side effects:  Lightheadedness    Ability to successfully perform activities of daily living:  Transportation requires assistance, shopping requires assistance, preparing meals requires assistance, housework requires assistance, laundry requires assistance, medication administration requires assistance and money management requires assistance    Home Safety:  Throw rugs in the hallway    Hearing Impairment:  Difficulty following a conversation in a noisy restaurant or crowded room, feel that people are mumbling or not speaking clearly, difficult to understand a speaker at a public meeting or [facility] service, need to ask people to speak up or repeat themselves, difficulty understanding soft or whispered speech and difficulty understanding speech on the telephone    In the past 6 months, have you been bothered by leaking of urine? Yes    In general, how would you rate your overall mental or emotional health?  Poor      PHQ-2 Total Score: 3    Additional concerns today:  Yes    Do you feel safe in your environment? Yes    Have you ever done Advance Care Planning? (For example, a Health Directive, POLST, or a discussion with a medical provider or your loved ones about your wishes):        Fall risk  Fallen 2 or more times in the past year?: No  Any fall with injury in the past year?: No    Cognitive Screening   1) Repeat 3 items (Leader, Season, " Table)    2) Clock draw: NORMAL  3) 3 item recall: Recalls 0objects  Results: 0 items recalled: COGNITIVE IMPAIRMENT LIKELY    Mini-CogTM Copyright S Dilan. Licensed by the author for use in [facility]; reprinted with permission ([email or web address]@[email or web address]). All rights reserved.      Do you have sleep apnea, excessive snoring or daytime drowsiness?: no    Reviewed and updated as needed this visit by clinical staff   Allergies  Meds              Reviewed and updated as needed this visit by Provider                Social History     Tobacco Use     Smoking status: Former Smoker     Packs/day: 2.00     Years: 40.00     Pack years: 80.00     Types: Cigarettes     Start date: 2/3/1960     Quit date: 2001     Years since quittin.7     Smokeless tobacco: Never Used   Substance Use Topics     Alcohol use: No     Alcohol/week: 0.0 standard drinks     If you drink alcohol do you typically have >3 drinks per day or >7 drinks per week? No    Alcohol Use 2021   Prescreen: >3 drinks/day or >7 drinks/week? No   Prescreen: >3 drinks/day or >7 drinks/week? -   No flowsheet data found.        PROBLEMS TO ADD ON...  Concern for feeling tired.   No weight change. Several lbs lighter. Appetite is OK.   Fluctuating.   Has h/o DM, on diet. Does not check sugars.     Has h/o depression. On medical treatment, takes Zoloft, controlled, no side effects. No depressive symptoms or suicidal ideation. Feels tired. That affects his mood.   Concern for impaired memory. Short term affected.     Has H/O BPH. On treatment with Flomax . Has had symptoms of frequency, decreased urinary stream,no hematuria or dysuria. No side effects from medications. Has nocturia.     Has symptoms of knee pain. For a year. Uses a cane. Has h/o OA. Worse with standing, walking.       Current providers sharing in care for this patient include:   Patient Care Team:  Ray Sun MD as PCP - General (Internal Medicine)  Rochelle Adam LSW as Lead  "Care Coordinator  Reyna Sutton as Other (see comments)  Chyna Toth Nikolay G, MD as Assigned PCP  Maite Mishra MD as Assigned Pulmonology Provider    The following health maintenance items are reviewed in Epic and correct as of today:  Health Maintenance Due   Topic Date Due     DIABETIC FOOT EXAM  Never done     ANNUAL REVIEW OF HM ORDERS  Never done     DEPRESSION ACTION PLAN  Never done     EYE EXAM  Never done     HEPATITIS C SCREENING  Never done     ZOSTER IMMUNIZATION (1 of 2) Never done     A1C  04/24/2019     PHQ-9  04/24/2019     MEDICARE ANNUAL WELLNESS VISIT  10/24/2019     BMP  10/24/2019     LIPID  10/24/2019     MICROALBUMIN  10/24/2019     Lab work is in process  Labs reviewed in EPIC        Pertinent mammograms are reviewed under the imaging tab.    Review of Systems   Constitutional: Negative for chills and fever.   HENT: Positive for hearing loss. Negative for congestion, ear pain and sore throat.    Eyes: Positive for visual disturbance. Negative for pain.   Respiratory: Negative for cough and shortness of breath.    Cardiovascular: Negative for chest pain, palpitations and peripheral edema.   Gastrointestinal: Negative for abdominal pain, constipation, diarrhea, heartburn, hematochezia and nausea.   Genitourinary: Negative for dysuria, frequency, genital sores, hematuria and urgency.   Musculoskeletal: Positive for arthralgias and myalgias. Negative for joint swelling.   Skin: Negative for rash.   Neurological: Positive for weakness. Negative for dizziness, headaches and paresthesias.   Psychiatric/Behavioral: Negative for mood changes. The patient is not nervous/anxious.          OBJECTIVE:   There were no vitals taken for this visit. Estimated body mass index is 19.89 kg/m  as calculated from the following:    Height as of 11/18/19: 1.702 m (5' 7\").    Weight as of 3/2/20: 57.6 kg (127 lb).  Physical Exam  GENERAL: weak, frail, alert and no distress  EYES: Eyes grossly " normal to inspection, PERRL and conjunctivae and sclerae normal  HENT: ear canals and TM's normal, nose and mouth without ulcers or lesions  NECK: no adenopathy, no asymmetry, masses, or scars and thyroid normal to palpation  RESP: lungs clear to auscultation - no rales, rhonchi or wheezes  CV: regular rate and rhythm, normal S1 S2, no S3 or S4, no murmur, click or rub, no peripheral edema and peripheral pulses strong  ABDOMEN: soft, nontender, no hepatosplenomegaly, no masses and bowel sounds normal  MS: no gross musculoskeletal defects noted, no edema, knees OA, and LS spine OA changes   SKIN: no suspicious lesions or rashes  NEURO: Normal strength and tone, mentation intact and speech normal, LE weakness , uses a cane  PSYCH: mentation appears normal, affect normal/bright    Diagnostic Test Results:  Labs reviewed in Epic    ASSESSMENT / PLAN:       ICD-10-CM    1. Encounter for preventative adult health care examination  Z00.00 CBC with platelets     Comprehensive metabolic panel     Lipid panel reflex to direct LDL Non-fasting     TSH with free T4 reflex     Prostate spec antigen screen     Hemoglobin A1c     *UA reflex to Microscopic     Albumin Random Urine Quantitative with Creat Ratio   2. Screening for prostate cancer  Z12.5 Prostate spec antigen screen   3. Hyperlipidemia LDL goal <160  E78.5 CBC with platelets     Comprehensive metabolic panel     Lipid panel reflex to direct LDL Non-fasting     TSH with free T4 reflex   4. Recurrent major depressive disorder, in partial remission (H)  F33.41    5. Type 2 diabetes mellitus without complication, without long-term current use of insulin (H)  E11.9 CBC with platelets     Comprehensive metabolic panel     Lipid panel reflex to direct LDL Non-fasting     TSH with free T4 reflex     Hemoglobin A1c     *UA reflex to Microscopic     Albumin Random Urine Quantitative with Creat Ratio   6. Primary osteoarthritis of both knees  M17.0    7. Weakness of both legs   "R29.898 CK total     Vitamin D Deficiency   8. Vitamin D deficiency  E55.9 CK total     Vitamin D Deficiency   9. Memory impairment  R41.3 Vitamin B12   10. Chest pain, unspecified type  R07.9 EKG 12-lead complete w/read - Clinics   11. Bilateral low back pain without sciatica, unspecified chronicity  M54.5 XR Lumbar Spine 2/3 Views   12. Pain in both knees, unspecified chronicity  M25.561 XR Knee Left 3 Views    M25.562 XR Knee Right 3 Views       Patient has been advised of split billing requirements and indicates understanding: Yes  COUNSELING:  Reviewed preventive health counseling, as reflected in patient instructions       Regular exercise       Healthy diet/nutrition       Vision screening       Colon cancer screening       Prostate cancer screening    Estimated body mass index is 19.89 kg/m  as calculated from the following:    Height as of 11/18/19: 1.702 m (5' 7\").    Weight as of 3/2/20: 57.6 kg (127 lb).        He reports that he quit smoking about 19 years ago. His smoking use included cigarettes. He started smoking about 61 years ago. He has a 80.00 pack-year smoking history. He has never used smokeless tobacco.      Appropriate preventive services were discussed with this patient, including applicable screening as appropriate for cardiovascular disease, diabetes, osteopenia/osteoporosis, and glaucoma.  As appropriate for age/gender, discussed screening for colorectal cancer, prostate cancer, breast cancer, and cervical cancer. Checklist reviewing preventive services available has been given to the patient.    Reviewed patients plan of care and provided an AVS. The Intermediate Care Plan ( asthma action plan, low back pain action plan, and migraine action plan) for Man meets the Care Plan requirement. This Care Plan has been established and reviewed with the Patient.    Counseling Resources:  ATP IV Guidelines  Pooled Cohorts Equation Calculator  Breast Cancer Risk Calculator  Breast Cancer: Medication " to Reduce Risk  FRAX Risk Assessment  ICSI Preventive Guidelines  Dietary Guidelines for Americans, 2010  "[employer]'s MyPlate  ASA Prophylaxis  Lung CA Screening    Ray Sun MD  [facility]    Identified Health Risks:  Answers for HPI/ROS submitted by the patient on 6/14/2021   Annual Exam:  If you checked off any problems, how difficult have these problems made it for you to do your work, take care of things at home, or get along with other people?: Somewhat difficult  PHQ9 TOTAL SCORE: 10

## 2021-06-14 NOTE — NURSING NOTE
"/70   Pulse 69   Temp 98  F (36.7  C)   Resp 12   Ht 1.676 m (5' 6\")   Wt 55.5 kg (122 lb 6.4 oz)   SpO2 98%   BMI 19.76 kg/m      "

## 2021-06-15 LAB
ALBUMIN SERPL-MCNC: 3.5 G/DL (ref 3.4–5)
ALP SERPL-CCNC: 72 U/L (ref 40–150)
ALT SERPL W P-5'-P-CCNC: 30 U/L (ref 0–70)
ANION GAP SERPL CALCULATED.3IONS-SCNC: 5 MMOL/L (ref 3–14)
AST SERPL W P-5'-P-CCNC: 22 U/L (ref 0–45)
BILIRUB SERPL-MCNC: 0.4 MG/DL (ref 0.2–1.3)
BUN SERPL-MCNC: 18 MG/DL (ref 7–30)
CALCIUM SERPL-MCNC: 8.5 MG/DL (ref 8.5–10.1)
CHLORIDE SERPL-SCNC: 103 MMOL/L (ref 94–109)
CHOLEST SERPL-MCNC: 213 MG/DL
CK SERPL-CCNC: 159 U/L (ref 30–300)
CO2 SERPL-SCNC: 30 MMOL/L (ref 20–32)
CREAT SERPL-MCNC: 1.43 MG/DL (ref 0.66–1.25)
CREAT UR-MCNC: 31 MG/DL
DEPRECATED CALCIDIOL+CALCIFEROL SERPL-MC: 50 UG/L (ref 20–75)
GFR SERPL CREATININE-BSD FRML MDRD: 47 ML/MIN/{1.73_M2}
GLUCOSE SERPL-MCNC: 79 MG/DL (ref 70–99)
HDLC SERPL-MCNC: 43 MG/DL
LDLC SERPL CALC-MCNC: 117 MG/DL
MICROALBUMIN UR-MCNC: 24 MG/L
MICROALBUMIN/CREAT UR: 77.71 MG/G CR (ref 0–17)
NONHDLC SERPL-MCNC: 170 MG/DL
POTASSIUM SERPL-SCNC: 3.8 MMOL/L (ref 3.4–5.3)
PROT SERPL-MCNC: 7.5 G/DL (ref 6.8–8.8)
PSA SERPL-ACNC: 3.22 UG/L (ref 0–4)
SODIUM SERPL-SCNC: 138 MMOL/L (ref 133–144)
TRIGL SERPL-MCNC: 264 MG/DL
TSH SERPL DL<=0.005 MIU/L-ACNC: 1.19 MU/L (ref 0.4–4)
VIT B12 SERPL-MCNC: 506 PG/ML (ref 193–986)

## 2021-06-15 ASSESSMENT — PATIENT HEALTH QUESTIONNAIRE - PHQ9: SUM OF ALL RESPONSES TO PHQ QUESTIONS 1-9: 10

## 2021-09-01 ENCOUNTER — DOCUMENTATION ONLY (OUTPATIENT)
Dept: LAB | Facility: CLINIC | Age: 76
End: 2021-09-01

## 2021-09-01 DIAGNOSIS — R79.89 ELEVATED SERUM CREATININE: Primary | ICD-10-CM

## 2021-09-27 ENCOUNTER — OFFICE VISIT (OUTPATIENT)
Dept: INTERNAL MEDICINE | Facility: CLINIC | Age: 76
End: 2021-09-27
Payer: COMMERCIAL

## 2021-09-27 VITALS
TEMPERATURE: 97.8 F | HEART RATE: 76 BPM | HEIGHT: 66 IN | WEIGHT: 121 LBS | OXYGEN SATURATION: 96 % | BODY MASS INDEX: 19.44 KG/M2 | SYSTOLIC BLOOD PRESSURE: 148 MMHG | DIASTOLIC BLOOD PRESSURE: 75 MMHG

## 2021-09-27 DIAGNOSIS — Z23 NEED FOR PROPHYLACTIC VACCINATION AND INOCULATION AGAINST INFLUENZA: ICD-10-CM

## 2021-09-27 DIAGNOSIS — Z01.818 PREOP GENERAL PHYSICAL EXAM: ICD-10-CM

## 2021-09-27 DIAGNOSIS — H25.019 CORTICAL AGE-RELATED CATARACT, UNSPECIFIED LATERALITY: ICD-10-CM

## 2021-09-27 DIAGNOSIS — E78.5 HYPERLIPIDEMIA LDL GOAL <160: ICD-10-CM

## 2021-09-27 DIAGNOSIS — F33.41 RECURRENT MAJOR DEPRESSIVE DISORDER, IN PARTIAL REMISSION (H): Primary | ICD-10-CM

## 2021-09-27 DIAGNOSIS — E11.9 TYPE 2 DIABETES MELLITUS WITHOUT COMPLICATION, WITHOUT LONG-TERM CURRENT USE OF INSULIN (H): ICD-10-CM

## 2021-09-27 PROCEDURE — 99214 OFFICE O/P EST MOD 30 MIN: CPT | Mod: 25 | Performed by: INTERNAL MEDICINE

## 2021-09-27 PROCEDURE — 80048 BASIC METABOLIC PNL TOTAL CA: CPT | Performed by: INTERNAL MEDICINE

## 2021-09-27 PROCEDURE — 36415 COLL VENOUS BLD VENIPUNCTURE: CPT | Performed by: INTERNAL MEDICINE

## 2021-09-27 PROCEDURE — 90662 IIV NO PRSV INCREASED AG IM: CPT | Performed by: INTERNAL MEDICINE

## 2021-09-27 PROCEDURE — G0008 ADMIN INFLUENZA VIRUS VAC: HCPCS | Performed by: INTERNAL MEDICINE

## 2021-09-27 RX ORDER — BUPROPION HYDROCHLORIDE 150 MG/1
150 TABLET ORAL EVERY MORNING
Qty: 90 TABLET | Refills: 3 | Status: SHIPPED | OUTPATIENT
Start: 2021-09-27 | End: 2022-04-30

## 2021-09-27 RX ORDER — SERTRALINE HYDROCHLORIDE 100 MG/1
100 TABLET, FILM COATED ORAL DAILY
Qty: 90 TABLET | Refills: 3 | Status: SHIPPED | OUTPATIENT
Start: 2021-09-27 | End: 2023-09-15

## 2021-09-27 ASSESSMENT — MIFFLIN-ST. JEOR: SCORE: 1216.6

## 2021-09-27 NOTE — LETTER
September 29, 2021      Josh Gonzalez  [address] [facility] [address]        Dear ,    We are writing to inform you of your test results.    Your test results fall within the expected range(s) or remain unchanged from previous results.  Please continue with current treatment plan.    Resulted Orders   Basic metabolic panel  (Ca, Cl, CO2, Creat, Gluc, K, Na, BUN)   Result Value Ref Range    Sodium 138 133 - 144 mmol/L    Potassium 4.0 3.4 - 5.3 mmol/L    Chloride 106 94 - 109 mmol/L    Carbon Dioxide (CO2) 27 20 - 32 mmol/L    Anion Gap 5 3 - 14 mmol/L    Urea Nitrogen 14 7 - 30 mg/dL    Creatinine 1.15 0.66 - 1.25 mg/dL    Calcium 8.4 (L) 8.5 - 10.1 mg/dL    Glucose 87 70 - 99 mg/dL    GFR Estimate 61 >60 mL/min/1.73m2      Comment:      As of July 11, 2021, eGFR is calculated by the CKD-EPI creatinine equation, without race adjustment. eGFR can be influenced by muscle mass, exercise, and diet. The reported eGFR is an estimation only and is only applicable if the renal function is stable.       If you have any questions or concerns, please call the clinic at the number listed above.       Sincerely,      Ray Sun MD

## 2021-09-27 NOTE — PROGRESS NOTES
Assessment & Plan     Recurrent major depressive disorder, in partial remission (H)  Offered psychiatry assessment, pt declines.   Continue Zoloft, start on Wellbutrin, if worsening symptoms reassess   - sertraline (ZOLOFT) 100 MG tablet; Take 1 tablet (100 mg) by mouth daily  - buPROPion (WELLBUTRIN XL) 150 MG 24 hr tablet; Take 1 tablet (150 mg) by mouth every morning    Type 2 diabetes mellitus without complication, without long-term current use of insulin (H)  Assess lab work   Cont treatment   - Basic metabolic panel  (Ca, Cl, CO2, Creat, Gluc, K, Na, BUN)    Need for prophylactic vaccination and inoculation against influenza  Immunized   - INFLUENZA, QUAD, HIGH DOSE, PF, 65YR + (FLUZONE HD)    Hyperlipidemia LDL goal <160  Keep diet, exercise     Preop general physical exam  Cleared for cataract surgery     Cortical age-related cataract, unspecified laterality  Surgical treatment                See Patient Instructions    Return in about 3 months (around 12/27/2021) for Routine Visit.    Ray Sun MD  [facility]    Subjective   Man is a 76 year old who presents for the following health issues     HPI   Chief Complaint   Patient presents with     Pain     Bilateral leg cramps/pain     Cough     Persistent cough at night time     Interpretor used.  Patient is seen for a follow up visit.  Has h/o depression. On medical treatment, not well controlled, no side effects. Has chronic daily depressive symptoms , no suicidal ideation.  Has H/O DM. On diet , exercise. Blood sugars are controlled. No parestesias. No hypoglycemias.  Has H/O hyperlipidemia. On  diet.  Concern for legs cramps, pain on and off. Has poor balance, unable to ambulate for longer distances.   Has had cough at night, postnasal drainage. No fever, SOB, CP.     Scheduled for eye cataract surgery. No acute complaints, no medication change or new medical conditions.      Review of Systems   Constitutional, HEENT,  "cardiovascular, pulmonary, gi and gu systems are negative, except as otherwise noted.      Objective    BP (!) 148/75 (BP Location: Right arm, Patient Position: Sitting, Cuff Size: Adult Regular)   Pulse 76   Temp 97.8  F (36.6  C) (Oral)   Ht 1.676 m (5' 6\")   Wt 54.9 kg (121 lb)   SpO2 96%   BMI 19.53 kg/m    Body mass index is 19.53 kg/m .  Physical Exam   GENERAL: weak, frail, alert and no distress  EYES: Eyes grossly normal to inspection, PERRL and conjunctivae and sclerae normal  HENT: ear canals and TM's normal, nose and mouth without ulcers or lesions  NECK: no adenopathy, no asymmetry, masses, or scars and thyroid normal to palpation  RESP: lungs clear to auscultation - no rales, rhonchi or wheezes  CV: regular rate and rhythm, normal S1 S2, no S3 or S4, no murmur, click or rub, no peripheral edema and peripheral pulses strong  ABDOMEN: soft, nontender, no hepatosplenomegaly, no masses and bowel sounds normal  MS: no gross musculoskeletal defects noted, no edema  SKIN: no suspicious lesions or rashes  NEURO: generalized weakness, mentation intact and speech normal, depressed mood     Office Visit on 06/14/2021   Component Date Value Ref Range Status     WBC 06/14/2021 6.7  4.0 - 11.0 10e9/L Final     RBC Count 06/14/2021 5.66  4.4 - 5.9 10e12/L Final     Hemoglobin 06/14/2021 13.9  13.3 - 17.7 g/dL Final     Hematocrit 06/14/2021 44.0  40.0 - 53.0 % Final     MCV 06/14/2021 78  78 - 100 fl Final     MCH 06/14/2021 24.6* 26.5 - 33.0 pg Final     MCHC 06/14/2021 31.6  31.5 - 36.5 g/dL Final     RDW 06/14/2021 15.2* 10.0 - 15.0 % Final     Platelet Count 06/14/2021 241  150 - 450 10e9/L Final     Sodium 06/14/2021 138  133 - 144 mmol/L Final     Potassium 06/14/2021 3.8  3.4 - 5.3 mmol/L Final     Chloride 06/14/2021 103  94 - 109 mmol/L Final     Carbon Dioxide 06/14/2021 30  20 - 32 mmol/L Final     Anion Gap 06/14/2021 5  3 - 14 mmol/L Final     Glucose 06/14/2021 79  70 - 99 mg/dL Final    Non " Fasting     Urea Nitrogen 06/14/2021 18  7 - 30 mg/dL Final     Creatinine 06/14/2021 1.43* 0.66 - 1.25 mg/dL Final     GFR Estimate 06/14/2021 47* >60 mL/min/[1.73_m2] Final    Comment: Non  GFR Calc  Starting 12/18/2018, serum creatinine based estimated GFR (eGFR) will be   calculated using the Chronic Kidney Disease Epidemiology Collaboration   (CKD-EPI) equation.       GFR Estimate If Black 06/14/2021 54* >60 mL/min/[1.73_m2] Final    Comment:  GFR Calc  Starting 12/18/2018, serum creatinine based estimated GFR (eGFR) will be   calculated using the Chronic Kidney Disease Epidemiology Collaboration   (CKD-EPI) equation.       Calcium 06/14/2021 8.5  8.5 - 10.1 mg/dL Final     Bilirubin Total 06/14/2021 0.4  0.2 - 1.3 mg/dL Final     Albumin 06/14/2021 3.5  3.4 - 5.0 g/dL Final     Protein Total 06/14/2021 7.5  6.8 - 8.8 g/dL Final     Alkaline Phosphatase 06/14/2021 72  40 - 150 U/L Final     ALT 06/14/2021 30  0 - 70 U/L Final     AST 06/14/2021 22  0 - 45 U/L Final     Cholesterol 06/14/2021 213* <200 mg/dL Final    Desirable:       <200 mg/dl     Triglycerides 06/14/2021 264* <150 mg/dL Final    Comment: Borderline high:  150-199 mg/dl  High:             200-499 mg/dl  Very high:       >499 mg/dl  Non Fasting       HDL Cholesterol 06/14/2021 43  >39 mg/dL Final     LDL Cholesterol Calculated 06/14/2021 117* <100 mg/dL Final    Comment: Above desirable:  100-129 mg/dl  Borderline High:  130-159 mg/dL  High:             160-189 mg/dL  Very high:       >189 mg/dl       Non HDL Cholesterol 06/14/2021 170* <130 mg/dL Final    Comment: Above Desirable:  130-159 mg/dl  Borderline high:  160-189 mg/dl  High:             190-219 mg/dl  Very high:       >219 mg/dl       TSH 06/14/2021 1.19  0.40 - 4.00 mU/L Final     PSA 06/14/2021 3.22  0 - 4 ug/L Final    Assay Method:  Chemiluminescence using Siemens Vista analyzer     Hemoglobin A1C 06/14/2021 6.0* 0 - 5.6 % Final    Comment: Normal  <5.7% Prediabetes 5.7-6.4%  Diabetes 6.5% or higher - adopted from ADA   consensus guidelines.       Color Urine 06/14/2021 Yellow   Final     Appearance Urine 06/14/2021 Clear   Final     Glucose Urine 06/14/2021 Negative  NEG^Negative mg/dL Final     Bilirubin Urine 06/14/2021 Negative  NEG^Negative Final     Ketones Urine 06/14/2021 Negative  NEG^Negative mg/dL Final     Specific Gravity Urine 06/14/2021 1.010  1.003 - 1.035 Final     Blood Urine 06/14/2021 Negative  NEG^Negative Final     pH Urine 06/14/2021 6.5  5.0 - 7.0 pH Final     Protein Albumin Urine 06/14/2021 Negative  NEG^Negative mg/dL Final     Urobilinogen Urine 06/14/2021 0.2  0.2 - 1.0 EU/dL Final     Nitrite Urine 06/14/2021 Negative  NEG^Negative Final     Leukocyte Esterase Urine 06/14/2021 Negative  NEG^Negative Final     Source 06/14/2021 Midstream Urine   Final     Creatinine Urine 06/14/2021 31  mg/dL Final     Albumin Urine mg/L 06/14/2021 24  mg/L Final     Albumin Urine mg/g Cr 06/14/2021 77.71* 0 - 17 mg/g Cr Final     CK Total 06/14/2021 159  30 - 300 U/L Final     Vitamin D Deficiency screening 06/14/2021 50  20 - 75 ug/L Final    Comment: Season, race, dietary intake, and treatment affect the concentration of   25-hydroxy-Vitamin D. Values may decrease during winter months and increase   during summer months. Values 20-29 ug/L may indicate Vitamin D insufficiency   and values <20 ug/L may indicate Vitamin D deficiency.  Vitamin D determination is routinely performed by an immunoassay specific for   25 hydroxyvitamin D3.  If an individual is on vitamin D2 (ergocalciferol)   supplementation, please specify 25 OH vitamin D2 and D3 level determination by   LCMSMS test [id number].       Vitamin B12 06/14/2021 506  193 - 986 pg/mL Final     Sed Rate 06/14/2021 15  0 - 20 mm/h Final

## 2021-09-28 ENCOUNTER — OFFICE VISIT (OUTPATIENT)
Dept: INTERNAL MEDICINE | Facility: CLINIC | Age: 76
End: 2021-09-28
Payer: COMMERCIAL

## 2021-09-28 VITALS
HEIGHT: 66 IN | SYSTOLIC BLOOD PRESSURE: 134 MMHG | BODY MASS INDEX: 20.15 KG/M2 | DIASTOLIC BLOOD PRESSURE: 62 MMHG | HEART RATE: 77 BPM | RESPIRATION RATE: 18 BRPM | TEMPERATURE: 97.7 F | OXYGEN SATURATION: 95 % | WEIGHT: 125.4 LBS

## 2021-09-28 DIAGNOSIS — H26.9 CATARACT OF BOTH EYES, UNSPECIFIED CATARACT TYPE: ICD-10-CM

## 2021-09-28 DIAGNOSIS — R73.03 PRE-DIABETES: ICD-10-CM

## 2021-09-28 DIAGNOSIS — Z01.818 PREOP GENERAL PHYSICAL EXAM: Primary | ICD-10-CM

## 2021-09-28 PROCEDURE — 99214 OFFICE O/P EST MOD 30 MIN: CPT | Performed by: NURSE PRACTITIONER

## 2021-09-28 ASSESSMENT — MIFFLIN-ST. JEOR: SCORE: 1236.56

## 2021-09-28 NOTE — PROGRESS NOTES
[place] [address]  [facility] [address]  Phone: [phone]  Primary Provider: Ray Sun  Pre-op Performing Provider:    KRISTINA BAIN,       PREOPERATIVE EVALUATION:  Today's date: 9/28/2021    Josh Gonzalez is a 76 year old male who presents for a preoperative evaluation.    Surgical Information:  Surgery/Procedure: Right eye cataract  Surgery Location: [facility] / [facility]  Surgeon: Dr. Jesus Blandon  Surgery Date: 10/5/21 - (LEFT EYE TO BE DONE  11/2/2021)  Time of Surgery: 7 AM  Where patient plans to recover: At home with family  Fax number for surgical facility:     Type of Anesthesia Anticipated: to be determined    Assessment & Plan     The proposed surgical procedure is considered LOW risk.    Preop general physical exam      Cataract of both eyes, unspecified cataract type  NEEDS REPAIR     PREDIABETES   HE DOES NOT HAVE DIABETES - HAS PREDIABETES AND HAS BEEN STABLE    LAB OK 9/27/2021           Risks and Recommendations:  The patient has the following additional risks and recommendations for perioperative complications:   - No identified additional risk factors other than previously addressed    Medication Instructions:  Patient is to take all scheduled medications on the day of surgery    RECOMMENDATION:  APPROVAL GIVEN to proceed with proposed procedure, without further diagnostic evaluation.        56}    Subjective     HPI related to upcoming procedure: CATARACT RIGHT EYE FIRST   LEFT EYE 11/2/2021    Preop Questions 9/28/2021   1. Have you ever had a heart attack or stroke? No   2. Have you ever had surgery on your heart or blood vessels, such as a stent placement, a coronary artery bypass, or surgery on an artery in your head, neck, heart, or legs? No   3. Do you have chest pain with activity? No   4. Do you have a history of  heart failure? No   5. Do you currently have a cold,  bronchitis or symptoms of other infection? No   6. Do you have a cough, shortness of breath, or wheezing? No   7. Do you or anyone in your family have previous history of blood clots? No   8. Do you or does anyone in your family have a serious bleeding problem such as prolonged bleeding following surgeries or cuts? No   9. Have you ever had problems with anemia or been told to take iron pills? No   10. Have you had any abnormal blood loss such as black, tarry or bloody stools? No   11. Have you ever had a blood transfusion? No   12. Are you willing to have a blood transfusion if it is medically needed before, during, or after your surgery? Yes   13. Have you or any of your relatives ever had problems with anesthesia? No   14. Do you have sleep apnea, excessive snoring or daytime drowsiness? YES -    14a. Do you have a CPAP machine? Yes   15. Do you have any artifical heart valves or other implanted medical devices like a pacemaker, defibrillator, or continuous glucose monitor? No   16. Do you have artificial joints? No   17. Are you allergic to latex? No       Health Care Directive:  Patient does not have a Health Care Directive or Living Will: Discussed advance care planning with patient; however, patient declined at this time.    Preoperative Review of :   reviewed - no record of controlled substances prescribed.      Status of Chronic Conditions:  See problem list for active medical problems.  Problems all longstanding and stable, except as noted/documented.  See ROS for pertinent symptoms related to these conditions.      Review of Systems  CONSTITUTIONAL: NEGATIVE for fever, chills, change in weight  INTEGUMENTARY/SKIN: NEGATIVE for worrisome rashes, moles or lesions  EYES: CATARACTS BILATERALLY   ENT/MOUTH: NEGATIVE for ear, mouth and throat problems  RESP: NEGATIVE for significant cough or SOB  CV: NEGATIVE for chest pain, palpitations or peripheral edema  GI: NEGATIVE for nausea, abdominal pain,  heartburn, or change in bowel habits  : NEGATIVE for frequency, dysuria, or hematuria  MUSCULOSKELETAL: NEGATIVE for significant arthralgias or myalgia  NEURO: NEGATIVE for weakness, dizziness or paresthesias  ENDOCRINE: NEGATIVE for temperature intolerance, skin/hair changes  HEME: NEGATIVE for bleeding problems  PSYCHIATRIC: NEGATIVE for changes in mood or affect    Patient Active Problem List    Diagnosis Date Noted     Abnormal CT lung screening 11/27/2019     Priority: Medium     Currently managed with follow up imaging by [facility] Results Team.         Primary osteoarthritis of both knees 08/01/2017     Priority: Medium     Type 2 diabetes mellitus without complication, without long-term current use of insulin (H) 04/25/2017     Priority: Medium     Mixed incontinence 12/20/2016     Priority: Medium     Gait instability 08/26/2016     Priority: Medium     KULWANT (obstructive sleep apnea) 07/05/2016     Priority: Medium     Major depression 03/11/2015     Priority: Medium     Hyperlipidemia LDL goal <160 01/11/2012     Priority: Medium     Advanced directives, counseling/discussion 11/29/2011     Priority: Medium     Advance Care Planning 12/13/2016: ACP Review of Chart / Resources Provided:  Reviewed chart for advance care plan.  Josh Gonzalez has no plan or code status on file. Discussed available resources and provided with information.Declines completed HCD.  Added by VIRGINIA Cagle  Advance Care Planning 1/22/2016: ACP Review of Chart / Resources Provided:  Reviewed chart for advance care plan.  Josh Gonzalez has no plan or code status on file. Discussed available resources and provided with information.  Confirmed/documented legally designated decision maker(s). Added by VIRGINIA Cagle            Past Medical History:   Diagnosis Date     Depression      Depressive disorder      Mixed incontinence 12/20/2016     Myalgia      KULWANT on CPAP      Postnasal drip      ROTATOR CUFF (CAPSULE)  SPRAIN      Past Surgical History:   Procedure Laterality Date     COLONOSCOPY       COLONOSCOPY N/A 2018    Procedure: COLONOSCOPY;  Surgeon: Brent Jeffries MD;  Location:  GI     Current Outpatient Medications   Medication Sig Dispense Refill     acetaminophen (TYLENOL) 325 MG tablet Take 2 tablets (650 mg) by mouth every 6 hours as needed for mild pain 100 tablet 3     albuterol (PROAIR HFA/PROVENTIL HFA/VENTOLIN HFA) 108 (90 Base) MCG/ACT inhaler Inhale 2 puffs into the lungs every 6 hours as needed for shortness of breath / dyspnea or wheezing 1 Inhaler 0     buPROPion (WELLBUTRIN XL) 150 MG 24 hr tablet Take 1 tablet (150 mg) by mouth every morning 90 tablet 3     calcium carbonate 500 mg-vitamin D 200 units (OSCAL WITH D;OYSTER SHELL CALCIUM) 500-200 MG-UNIT per tablet Take 2 tablets by mouth daily       Cholecalciferol (VITAMIN D3) 25 MCG TABS Take 1 tablet (25 mcg) by mouth daily 90 tablet 1     diphenhydrAMINE (BENADRYL) 25 MG tablet Take 1 tablet (25 mg) by mouth At Bedtime 30 tablet 11     naproxen (NAPROSYN) 500 MG tablet TAKE 1 TABLET (500 MG) BY MOUTH 2 TIMES DAILY AS NEEDED FOR MODERATE PAIN 30 tablet 2     ORDER FOR DME Equipment being ordered: CPAP supplies (mask, tubing etc.) 1 Device 5     sertraline (ZOLOFT) 100 MG tablet Take 1 tablet (100 mg) by mouth daily 90 tablet 3     tamsulosin (FLOMAX) 0.4 MG capsule Take 1 capsule (0.4 mg) by mouth daily (Patient not taking: Reported on 2021) 90 capsule 3       No Known Allergies     Social History     Tobacco Use     Smoking status: Former Smoker     Packs/day: 2.00     Years: 40.00     Pack years: 80.00     Types: Cigarettes     Start date: 2/3/1960     Quit date: 2001     Years since quittin.0     Smokeless tobacco: Never Used   Substance Use Topics     Alcohol use: No     Alcohol/week: 0.0 standard drinks     Family History   Problem Relation Age of Onset     Unknown/Adopted Mother         most family members lived to 80s  "but no medical condition known     Unknown/Adopted Father      Colon Cancer No family hx of      Lung Cancer No family hx of      History   Drug Use No         Objective     /62 (BP Location: Left arm, Patient Position: Sitting, Cuff Size: Adult Regular)   Pulse 77   Temp 97.7  F (36.5  C) (Tympanic)   Resp 18   Ht 1.676 m (5' 6\")   Wt 56.9 kg (125 lb 6.4 oz)   SpO2 95%   BMI 20.24 kg/m      Physical Exam    GENERAL APPEARANCE: healthy, alert and no distress     EYES: CATARACT BILATERALLY      HENT: ear canals and TM's normal and nose and mouth without ulcers or lesions     NECK: no adenopathy, no asymmetry, masses, or scars and thyroid normal to palpation     RESP: lungs clear to auscultation - no rales, rhonchi or wheezes     CV: regular rates and rhythm, normal S1 S2, no S3 or S4 and no murmur, click or rub     ABDOMEN:  soft, nontender, no HSM or masses and bowel sounds normal     MS: extremities normal- no gross deformities noted, no evidence of inflammation in joints, FROM in all extremities.     SKIN: no suspicious lesions or rashes     NEURO: Normal strength and tone, sensory exam grossly normal, mentation intact and speech normal     PSYCH: mentation appears normal. and affect normal/bright    Recent Labs   Lab Test 09/27/21  1058 06/14/21  1515   HGB  --  13.9   PLT  --  241    138   POTASSIUM 4.0 3.8   CR 1.15 1.43*   A1C  --  6.0*        Diagnostics:  No labs were ordered during this visit.   EKG: appears normal, NSR, 6/2021    Revised Cardiac Risk Index (RCRI):  The patient has the following serious cardiovascular risks for perioperative complications:   - No serious cardiac risks = 0 points     RCRI Interpretation: 0 points: Class I (very low risk - 0.4% complication rate)           Signed Electronically by: CURRY Mohr CNP  Copy of this evaluation report is provided to requesting physician.      "

## 2021-10-06 LAB
ANION GAP SERPL CALCULATED.3IONS-SCNC: 5 MMOL/L (ref 3–14)
BUN SERPL-MCNC: 14 MG/DL (ref 7–30)
CALCIUM SERPL-MCNC: 9.4 MG/DL (ref 8.5–10.1)
CHLORIDE BLD-SCNC: 106 MMOL/L (ref 94–109)
CO2 SERPL-SCNC: 27 MMOL/L (ref 20–32)
CREAT SERPL-MCNC: 1.15 MG/DL (ref 0.66–1.25)
GFR SERPL CREATININE-BSD FRML MDRD: 61 ML/MIN/1.73M2
GLUCOSE BLD-MCNC: 87 MG/DL (ref 70–99)
POTASSIUM BLD-SCNC: 4 MMOL/L (ref 3.4–5.3)
SODIUM SERPL-SCNC: 138 MMOL/L (ref 133–144)

## 2021-10-28 ENCOUNTER — PATIENT OUTREACH (OUTPATIENT)
Dept: GERIATRIC MEDICINE | Facility: CLINIC | Age: 76
End: 2021-10-28

## 2021-10-28 NOTE — PROGRESS NOTES
[facility] Care Coordination Contact      [facility] Six-Month Telephone Assessment    6 month telephone assessment completed on 10/28/21 with daughter Yogi.    ER visits: No  Hospitalizations: No  TCU stays: No  Significant health status changes: Member had cataract surgery in early October and the other eye scheduled in November. Daughter reports that he can already see much better following the surgery on one eye.  Falls/Injuries: No  ADL/IADL changes: No  Changes in services: No    Caregiver Assessment follow up:  Mailed to daughter Yogi but was not returned.    Goals: See POC in chart for goal progress documentation.      Will see member in 6 months for an annual health risk assessment.   Encouraged member to call CC with any questions or concerns in the meantime.   VIRGINIA Durbin, CCM  [facility] Care Coordinator  Tel [phone]  Fax [phone]  Cell [phone]

## 2021-11-16 ENCOUNTER — PATIENT OUTREACH (OUTPATIENT)
Dept: GERIATRIC MEDICINE | Facility: CLINIC | Age: 76
End: 2021-11-16
Payer: COMMERCIAL

## 2021-11-16 NOTE — PROGRESS NOTES
[facility] Care Coordination Contact    Received VM from daughter Yogi stating that they would like to change PCA agencies for member.  They would like to change to [facility] which is the agency that  Member's wife has.  Yogi has spoken with Jane at [facility].  Called Jane at [facility] and they will send the flexible PCA verification form to [facility], located within [facility].  [facility] will call Care Coordinator with a start date once member's PCA (his daughter) has completed steps to be hired by them.  Care Coordinator will then submit new auth to [facility]. Care Coordinator faxed current PCA assessment to [facility].  VIRGINIA Durbin, [facility] Care Coordinator  Tel [phone]  Fax [phone]  Cell [phone]

## 2022-04-30 ENCOUNTER — APPOINTMENT (OUTPATIENT)
Dept: GENERAL RADIOLOGY | Facility: CLINIC | Age: 77
End: 2022-04-30
Attending: EMERGENCY MEDICINE
Payer: COMMERCIAL

## 2022-04-30 ENCOUNTER — HOSPITAL ENCOUNTER (EMERGENCY)
Facility: CLINIC | Age: 77
Discharge: HOME | End: 2022-04-30
Attending: EMERGENCY MEDICINE | Admitting: EMERGENCY MEDICINE
Payer: COMMERCIAL

## 2022-04-30 VITALS
BODY MASS INDEX: 21.41 KG/M2 | HEIGHT: 64 IN | RESPIRATION RATE: 18 BRPM | HEART RATE: 74 BPM | OXYGEN SATURATION: 99 % | TEMPERATURE: 99.2 F | DIASTOLIC BLOOD PRESSURE: 60 MMHG | SYSTOLIC BLOOD PRESSURE: 132 MMHG

## 2022-04-30 DIAGNOSIS — U07.1 INFECTION DUE TO 2019 NOVEL CORONAVIRUS: ICD-10-CM

## 2022-04-30 LAB
ALBUMIN UR-MCNC: 30 MG/DL
ANION GAP SERPL CALCULATED.3IONS-SCNC: 5 MMOL/L (ref 3–14)
APPEARANCE UR: CLEAR
BACTERIA #/AREA URNS HPF: ABNORMAL /HPF
BASOPHILS # BLD AUTO: 0 10E3/UL (ref 0–0.2)
BASOPHILS NFR BLD AUTO: 0 %
BILIRUB UR QL STRIP: NEGATIVE
BUN SERPL-MCNC: 15 MG/DL (ref 7–30)
CALCIUM SERPL-MCNC: 8.2 MG/DL (ref 8.5–10.1)
CHLORIDE BLD-SCNC: 97 MMOL/L (ref 94–109)
CO2 SERPL-SCNC: 29 MMOL/L (ref 20–32)
COLOR UR AUTO: ABNORMAL
CREAT SERPL-MCNC: 0.98 MG/DL (ref 0.66–1.25)
EOSINOPHIL # BLD AUTO: 0 10E3/UL (ref 0–0.7)
EOSINOPHIL NFR BLD AUTO: 0 %
ERYTHROCYTE [DISTWIDTH] IN BLOOD BY AUTOMATED COUNT: 14.4 % (ref 10–15)
FLUAV RNA SPEC QL NAA+PROBE: NEGATIVE
FLUBV RNA RESP QL NAA+PROBE: NEGATIVE
GFR SERPL CREATININE-BSD FRML MDRD: 79 ML/MIN/1.73M2
GLUCOSE BLD-MCNC: 186 MG/DL (ref 70–99)
GLUCOSE UR STRIP-MCNC: 70 MG/DL
HCT VFR BLD AUTO: 44.4 % (ref 40–53)
HGB BLD-MCNC: 13.7 G/DL (ref 13.3–17.7)
HGB UR QL STRIP: ABNORMAL
HOLD SPECIMEN: NORMAL
IMM GRANULOCYTES # BLD: 0.1 10E3/UL
IMM GRANULOCYTES NFR BLD: 1 %
KETONES UR STRIP-MCNC: ABNORMAL MG/DL
LEUKOCYTE ESTERASE UR QL STRIP: NEGATIVE
LYMPHOCYTES # BLD AUTO: 1 10E3/UL (ref 0.8–5.3)
LYMPHOCYTES NFR BLD AUTO: 8 %
MCH RBC QN AUTO: 24.2 PG (ref 26.5–33)
MCHC RBC AUTO-ENTMCNC: 30.9 G/DL (ref 31.5–36.5)
MCV RBC AUTO: 79 FL (ref 78–100)
MONOCYTES # BLD AUTO: 0.9 10E3/UL (ref 0–1.3)
MONOCYTES NFR BLD AUTO: 8 %
NEUTROPHILS # BLD AUTO: 9.6 10E3/UL (ref 1.6–8.3)
NEUTROPHILS NFR BLD AUTO: 83 %
NITRATE UR QL: NEGATIVE
NRBC # BLD AUTO: 0 10E3/UL
NRBC BLD AUTO-RTO: 0 /100
PH UR STRIP: 6 [PH] (ref 5–7)
PLATELET # BLD AUTO: 202 10E3/UL (ref 150–450)
POTASSIUM BLD-SCNC: 3.6 MMOL/L (ref 3.4–5.3)
RBC # BLD AUTO: 5.65 10E6/UL (ref 4.4–5.9)
RBC URINE: 1 /HPF
RSV RNA SPEC NAA+PROBE: NEGATIVE
SARS-COV-2 RNA RESP QL NAA+PROBE: POSITIVE
SODIUM SERPL-SCNC: 131 MMOL/L (ref 133–144)
SP GR UR STRIP: 1.01 (ref 1–1.03)
UROBILINOGEN UR STRIP-MCNC: NORMAL MG/DL
WBC # BLD AUTO: 11.6 10E3/UL (ref 4–11)
WBC URINE: 1 /HPF

## 2022-04-30 PROCEDURE — 36415 COLL VENOUS BLD VENIPUNCTURE: CPT | Performed by: EMERGENCY MEDICINE

## 2022-04-30 PROCEDURE — 81001 URINALYSIS AUTO W/SCOPE: CPT | Performed by: EMERGENCY MEDICINE

## 2022-04-30 PROCEDURE — 250N000013 HC RX MED GY IP 250 OP 250 PS 637: Performed by: EMERGENCY MEDICINE

## 2022-04-30 PROCEDURE — 94640 AIRWAY INHALATION TREATMENT: CPT

## 2022-04-30 PROCEDURE — 99284 EMERGENCY DEPT VISIT MOD MDM: CPT | Mod: 25

## 2022-04-30 PROCEDURE — C9803 HOPD COVID-19 SPEC COLLECT: HCPCS

## 2022-04-30 PROCEDURE — 87637 SARSCOV2&INF A&B&RSV AMP PRB: CPT | Performed by: EMERGENCY MEDICINE

## 2022-04-30 PROCEDURE — 85025 COMPLETE CBC W/AUTO DIFF WBC: CPT | Performed by: EMERGENCY MEDICINE

## 2022-04-30 PROCEDURE — 80048 BASIC METABOLIC PNL TOTAL CA: CPT | Performed by: EMERGENCY MEDICINE

## 2022-04-30 PROCEDURE — 71045 X-RAY EXAM CHEST 1 VIEW: CPT

## 2022-04-30 RX ORDER — ALBUTEROL SULFATE 90 UG/1
2 AEROSOL, METERED RESPIRATORY (INHALATION) ONCE
Status: COMPLETED | OUTPATIENT
Start: 2022-04-30 | End: 2022-04-30

## 2022-04-30 RX ORDER — ACETAMINOPHEN 325 MG/1
650 TABLET ORAL ONCE
Status: COMPLETED | OUTPATIENT
Start: 2022-04-30 | End: 2022-04-30

## 2022-04-30 RX ADMIN — ACETAMINOPHEN 650 MG: 325 TABLET, FILM COATED ORAL at 13:52

## 2022-04-30 RX ADMIN — ALBUTEROL SULFATE 2 PUFF: 90 AEROSOL, METERED RESPIRATORY (INHALATION) at 13:52

## 2022-04-30 NOTE — ED NOTES
Discussed medications with daughter and pt's wife. pts wife says he only takes Vitamin D, its the only bottle at home.

## 2022-04-30 NOTE — DISCHARGE INSTRUCTIONS
May use albuterol every 4-6 hours as needed for cough and shortness of breath.    Covid get well loop was prescribed, they will continue to follow with you and help monitor your symptoms.    Continue good supportive cares including fluids, Tylenol ibuprofen as needed, rest and continue to monitor symptoms.    If shortness of breath, chest pain, difficulty breathing or worsening symptoms develop you may return to the ER at any point for reevaluation.        Discharge Instructions  COVID-19    COVID-19 is the disease caused by a new coronavirus. The virus spreads from person-to-person primarily by droplets when an infected person coughs or sneezes and the droplets are then breathed in by another person.    Symptoms of COVID-19  Many people have no symptoms or mild symptoms.  Symptoms usually appear within a few days, but up to 14-days, after contact with a person with COVID-19.    A mild COVID-19 illness is like a cold and can have fever, cough, sneezing, sore throat, tiredness, headache, and muscle pain.    A moderate COVID-19 illness might include shortness of breath or pneumonia on a chest x-ray.    A severe COVID-19 illness causes significant breathing problems such as low oxygen levels or more serious pneumonia.  Some patients experience loss of taste or smell which is somewhat unique to COVID-19.      Isolation and Quarantine  Testing is recommended for any person with symptoms that could be COVID-19 and often for those exposed to COVID-19. The best way to stop the spread of the virus is to avoid contact with others.    A close contact exposure is being within 6 feet of someone with COVID for 15 minutes.    Isolation refers to sick people staying away from people who are not sick.    A person in quarantine is limiting activity because they were exposed and are waiting to see if they might become sick.    If you test positive for COVID and have no symptoms, you should stay home (isolation) for 5 full days after  the day of the test. You should then wear a mask when around others for another 5 days.    If you test positive for COVID and have mild symptoms, you should stay home (isolation) for at least 5 days after your symptoms began. You can return to normal activities at that time, wearing a mask when around others, for another 5 days as long as your symptoms are improving/resolving and you have been without a fever for 24 hours (without using fever-reducing medicine).    If you test positive for COVID and have more than mild symptoms, you should stay home (isolation) for at least 10 days after your symptoms began. You can return to normal activities at that time as long as your symptoms are improving and you have been without a fever for 24 hours (without using fever-reducing medicine).  For example, if you have a fever and cough for 6 days, you need to stay home 4 more days with no fever for a total of 10 days. Or, if you have a fever and cough for 10 days, you need to stay home one more day with no fever for a total of 11 days.    If you were exposed to COVID and are not vaccinated (or it has been more than six months from your Pfizer or Moderna vaccine or two months from J&J vaccine), you should stay home (quarantine) for 5 days and then wear a mask around others for 5 additional days. A COVID test at day 5 is recommended.    If you were exposed to COVID and are vaccinated (had a booster, had two shots of Pfizer or Moderna vaccine in the last five months, or had J&J vaccine within two months), you do not need to quarantine but should wear a mask around others for 10 days and get a COVID test on day 5.    If you have symptoms but a negative test, you should stay at home until you have mild/improving symptoms and are without fever for 24 hours, using the same judgment you would for when it is safe to return to work/school from strep throat, influenza, or the common cold. If you worsen, you should consider being  re-evaluated.    If you are being tested for COVID because of symptoms and your test is pending, you should stay home until you know your test result.  More details on isolation and quarantine can be found on this website from the CDC:  https://www.cdc.gov/coronavirus/2019-ncov/your-health/quarantine-isolation.html    If I have COVID, how should I protect myself and others?    Do not go to work or school. Have a friend or relative do your shopping. Do not use public transportation (bus, train) or ridesharing (Lyft, Uber).    Separate yourself from other people in your home. As much as possible, you should stay in one room and away from other people in your home. Also, use a separate bathroom, if possible. Avoid handling pets or other animals while sick.     Wear a facemask if you need to be around other people and cover your mouth and nose with a tissue when you cough or sneeze.     Avoid sharing personal household items. You should not share dishes, drinking glasses, forks/knives/spoons, towels, or bedding with other people in your home. After using these items, they should be washed with soap and water. Clean parts of your home that are touched often (doorknobs, faucets, countertops, etc.) daily.     Wash your hands often with soap and water for at least 20 seconds or use an alcohol-based hand  containing at least 60% alcohol.     Avoid touching your face.    Treat your symptoms. You can take Acetaminophen (Tylenol) to treat body aches and fever as needed for comfort. Ibuprofen (Advil or Motrin) can be used as well if you still have symptoms after taking Tylenol. Drink fluids. Rest.    Watch for worsening symptoms such as shortness of breath/difficulty breathing or very severe weakness.    Employers/workplaces are being asked by the Centers for Disease Control (CDC) to not request notes/documentation for you to return to work or prove that you were ill. You may choose to show your employer this paperwork.  Also, repeat testing should not be required to return to work.    Exercise/Sports in rare cases, COVID could affect your heart in a way that makes exercise or participation in sports dangerous.  If you have a mild COVID illness (fever, cough, sore throat, and similar symptoms but no difficulty breathing or abnormalities of the lung): After your COVID symptoms have resolved, wait 14-days before returning to activity.  If you have more than a mild illness (meaning that you have problems with your breathing or lungs) or if you participate in competitive or strenuous activity or have a history of heart disease: Please see your primary doctor/provider prior to return to activity/competition.    Antibody treatments are available for patients with mild to moderate COVID illness in order to prevent severe illness. In general, only patients with risk factors for severe illness are eligible for treatment. For more information, to see if you are eligible, and to find treatment, go to the [facility]:    [email or web address]     Return to the Emergency Department if:    If you are developing worsening breathing, shortness of breath, or feel worse you should seek medical attention.  If you are uncertain, contact your health care provider/clinic. If you need emergency medical attention, call 911 and tell them you have been ill.

## 2022-04-30 NOTE — ED PROVIDER NOTES
"         MASSBP Score 4/30/2022 4/30/2022   Age Greater than or equal to 65 years 2 2   BMI greater than or equal to 35 kg/m2 - 0   Has Diabetes Mellitus 2 2   Has Chronic Kidney Disease 0 0   Has Cardiovascular Disease and 55 years or older 0 0   Has Chronic Respiratory Disease and 55 years or older 0 0   Has Hypertension and 55 years or older 0 0   Is Immunocompromised 0 0   Is Pregnant 0 0   Member of [facility] community (Black/, /, ,  or , or  or [place] Native)  0 0   MASSBP Score - 4   Has the patient had a positive COVID test outside our system?  No -   What day did symptoms start?  4/28/2022 4/28/2022       Estimated body mass index is 20.24 kg/m  as calculated from the following:    Height as of 9/28/21: 1.676 m (5' 6\").    Weight as of 9/28/21: 56.9 kg (125 lb 6.4 oz).     GFR Estimate   Date Value Ref Range Status   04/30/2022 79 >60 mL/min/1.73m2 Final     Comment:     Effective December 21, 2021 eGFRcr in adults is calculated using the 2021 CKD-EPI creatinine equation which includes age and gender (Guerda et al., NEJM, DOI: 10.1056/LIWEgh6489897)   06/14/2021 47 (L) >60 mL/min/[1.73_m2] Final     Comment:     Non  GFR Calc  Starting 12/18/2018, serum creatinine based estimated GFR (eGFR) will be   calculated using the Chronic Kidney Disease Epidemiology Collaboration   (CKD-EPI) equation.          FDA Facts Sheet  [facility] Drug Interaction review    Medications were reviewed with the patient and held or adjusted where applicable.    No current facility-administered medications for this encounter.     Current Outpatient Medications   Medication     acetaminophen (TYLENOL) 325 MG tablet     albuterol (PROAIR HFA/PROVENTIL HFA/VENTOLIN HFA) 108 (90 Base) MCG/ACT inhaler     buPROPion (WELLBUTRIN XL) 150 MG 24 hr tablet     calcium carbonate 500 mg-vitamin D 200 units (OSCAL WITH D;OYSTER SHELL CALCIUM) 500-200 " MG-UNIT per tablet     diphenhydrAMINE (BENADRYL) 25 MG tablet     naproxen (NAPROSYN) 500 MG tablet     ORDER FOR DME     sertraline (ZOLOFT) 100 MG tablet     tamsulosin (FLOMAX) 0.4 MG capsule     VITAMIN D3 25 MCG (1000 UT) tablet                                          Monika Adam MD  04/30/22 [id number]

## 2022-05-03 ENCOUNTER — PATIENT OUTREACH (OUTPATIENT)
Dept: GERIATRIC MEDICINE | Facility: CLINIC | Age: 77
End: 2022-05-03
Payer: COMMERCIAL

## 2022-05-03 NOTE — PROGRESS NOTES
[facility] Care Coordination Contact    Called member to schedule annual HRA home visit via language line.  Spoke to wife and scheduled assessment for next week.  HRA has been scheduled for 5/11 at 1pm. Member has COVID but wife states he will be well enough to participate in annual reassessment.   Mckenzie Nina RN, BSN, PHN  [facility]  [phone]  Fax: [phone]

## 2022-05-09 ENCOUNTER — VIRTUAL VISIT (OUTPATIENT)
Dept: INTERNAL MEDICINE | Facility: CLINIC | Age: 77
End: 2022-05-09
Payer: COMMERCIAL

## 2022-05-09 DIAGNOSIS — U07.1 INFECTION DUE TO 2019 NOVEL CORONAVIRUS: ICD-10-CM

## 2022-05-09 DIAGNOSIS — Z09 HOSPITAL DISCHARGE FOLLOW-UP: Primary | ICD-10-CM

## 2022-05-09 PROCEDURE — 99212 OFFICE O/P EST SF 10 MIN: CPT | Mod: 95 | Performed by: INTERNAL MEDICINE

## 2022-05-09 NOTE — PROGRESS NOTES
Man is a 76 year old who is being evaluated via a billable telephone visit.      What phone number would you like to be contacted at? [phone] - Call Natacha  at [phone], option [phone]  How would you like to obtain your AVS?     Assessment & Plan     Hospital discharge follow-up  Improved symptoms of URI.     Infection due to 2019 novel coronavirus  Finished Paxlovid treatment. Feels better.   No symptoms of cough, SOB, CP or fever.                See Patient Instructions    Return in about 4 weeks (around 6/6/2022) for Physical Exam.    Ray Sun MD  [facility]    Subjective   Man is a 76 year old who presents for the following health issues     HPI     ED/UC Followup:    Facility:  [facility]  Date of visit: 04/30/2022  Reason for visit: Covid 19  Current Status: improved      Interpretor by phone used     Patient is seen for a follow up visit.  Seen in ED for URI, diagnosed with COVID 19 infection. No pneumonia, normal lab work  Treated with Paxlovid. Had cough, fever, myalgia.   Now is better. Has mild non productive cough. Has lost 4 labs, now better appetite.       Review of Systems   Constitutional, HEENT, cardiovascular, pulmonary, gi and gu systems are negative, except as otherwise noted.      Objective           Vitals:  No vitals were obtained today due to virtual visit.    Physical Exam   healthy, alert and no distress  PSYCH: Alert and oriented times 3; coherent speech, normal   rate and volume, able to articulate logical thoughts, able   to abstract reason, no tangential thoughts, no hallucinations   or delusions  His affect is normal  RESP: No cough, no audible wheezing, able to talk in full sentences  Remainder of exam unable to be completed due to telephone visits    Admission on 04/30/2022, Discharged on 04/30/2022   Component Date Value Ref Range Status     Influenza A PCR 04/30/2022 Negative  Negative Final     Influenza B PCR 04/30/2022 Negative   Negative Final     RSV PCR 04/30/2022 Negative  Negative Final     SARS CoV2 PCR 04/30/2022 Positive (A) Negative Final    POSITIVE: SARS-CoV-2 (COVID-19) RNA detected, presumed positive.     Hold Specimen 04/30/2022 [facility]   Final     Hold Specimen 04/30/2022 [facility]   Final     Hold Specimen 04/30/2022 [facility]   Final     Sodium 04/30/2022 131 (A) 133 - 144 mmol/L Final     Potassium 04/30/2022 3.6  3.4 - 5.3 mmol/L Final    Specimen slightly hemolyzed, potassium may be falsely elevated.     Chloride 04/30/2022 97  94 - 109 mmol/L Final     Carbon Dioxide (CO2) 04/30/2022 29  20 - 32 mmol/L Final     Anion Gap 04/30/2022 5  3 - 14 mmol/L Final     Urea Nitrogen 04/30/2022 15  7 - 30 mg/dL Final     Creatinine 04/30/2022 0.98  0.66 - 1.25 mg/dL Final     Calcium 04/30/2022 8.2 (A) 8.5 - 10.1 mg/dL Final     Glucose 04/30/2022 186 (A) 70 - 99 mg/dL Final     GFR Estimate 04/30/2022 79  >60 mL/min/1.73m2 Final    Effective December 21, 2021 eGFRcr in adults is calculated using the 2021 CKD-EPI creatinine equation which includes age and gender (Guerda et al., [facility], DOI: 10.1056/[id number])     Color Urine 04/30/2022 Light Yellow  Colorless, Straw, Light Yellow, Yellow Final     Appearance Urine 04/30/2022 Clear  Clear Final     Glucose Urine 04/30/2022 70  (A) Negative mg/dL Final     Bilirubin Urine 04/30/2022 Negative  Negative Final     Ketones Urine 04/30/2022 Trace (A) Negative mg/dL Final     Specific Gravity Urine 04/30/2022 1.010  1.003 - 1.035 Final     Blood Urine 04/30/2022 Small (A) Negative Final     pH Urine 04/30/2022 6.0  5.0 - 7.0 Final     Protein Albumin Urine 04/30/2022 30  (A) Negative mg/dL Final     Urobilinogen Urine 04/30/2022 Normal  Normal, 2.0 mg/dL Final     Nitrite Urine 04/30/2022 Negative  Negative Final     Leukocyte Esterase Urine 04/30/2022 Negative  Negative Final     Bacteria Urine 04/30/2022 Few (A) None Seen /HPF Final     RBC Urine 04/30/2022 1  <=2 /HPF Final     WBC Urine 04/30/2022 1   <=5 /HPF Final     WBC Count 04/30/2022 11.6 (A) 4.0 - 11.0 10e3/uL Final     RBC Count 04/30/2022 5.65  4.40 - 5.90 10e6/uL Final     Hemoglobin 04/30/2022 13.7  13.3 - 17.7 g/dL Final     Hematocrit 04/30/2022 44.4  40.0 - 53.0 % Final     MCV 04/30/2022 79  78 - 100 fL Final     MCH 04/30/2022 24.2 (A) 26.5 - 33.0 pg Final     MCHC 04/30/2022 30.9 (A) 31.5 - 36.5 g/dL Final     RDW 04/30/2022 14.4  10.0 - 15.0 % Final     Platelet Count 04/30/2022 202  150 - 450 10e3/uL Final     % Neutrophils 04/30/2022 83  % Final     % Lymphocytes 04/30/2022 8  % Final     % Monocytes 04/30/2022 8  % Final     % Eosinophils 04/30/2022 0  % Final     % Basophils 04/30/2022 0  % Final     % Immature Granulocytes 04/30/2022 1  % Final     NRBCs per 100 WBC 04/30/2022 0  <1 /100 Final     Absolute Neutrophils 04/30/2022 9.6 (A) 1.6 - 8.3 10e3/uL Final     Absolute Lymphocytes 04/30/2022 1.0  0.8 - 5.3 10e3/uL Final     Absolute Monocytes 04/30/2022 0.9  0.0 - 1.3 10e3/uL Final     Absolute Eosinophils 04/30/2022 0.0  0.0 - 0.7 10e3/uL Final     Absolute Basophils 04/30/2022 0.0  0.0 - 0.2 10e3/uL Final     Absolute Immature Granulocytes 04/30/2022 0.1  <=0.4 10e3/uL Final     Absolute NRBCs 04/30/2022 0.0  10e3/uL Final               Phone call duration: 9 minutes

## 2022-05-11 ENCOUNTER — PATIENT OUTREACH (OUTPATIENT)
Dept: GERIATRIC MEDICINE | Facility: CLINIC | Age: 77
End: 2022-05-11
Payer: COMMERCIAL

## 2022-05-11 ASSESSMENT — PATIENT HEALTH QUESTIONNAIRE - PHQ9: SUM OF ALL RESPONSES TO PHQ QUESTIONS 1-9: 19

## 2022-05-11 NOTE — PROGRESS NOTES
[facility] Care Coordination Contact    [facility] Home Visit Assessment     Home visit for Health Risk Assessment with Josh Gonzalez completed on May 11, 2022  Remote assessment due to COVID  Member COVID +, is able to fully participate in assessment.   [facility] language line for interpreting.     Type of residence:: Private home - stairs  Current living arrangement:: I live in a private home with family- lives with wife daughter and daughters family in a single family home     Assessment completed with:: [facility].     Current Care Plan  Member currently receiving the following home care services:   None  Member currently receiving the following community resources: PCA, [facility] EW, Day Care      Medication Review  Medication reconciliation completed in Epic: Yes  Medication set-up & administration: Family/informal caregiver sets up weekly.  Family caregiver administers medications.  Medication Risk Assessment Medication (1 or more, place referral to MTM): N/A: No risk factors identified  MTM Referral Placed: No: No risk factors idenified    Mental/Behavioral Health   Depression Screening:      PHQ-9 Total Score: 19    Mental health DX:: Yes   Mental health DX how managed:: None    Falls Assessment:   Fallen 2 or more times in the past year?: Yes   Any fall with injury in the past year?: No    ADL/IADL Dependencies:   Dependent ADLs:: Ambulation-cane, Bathing, Dressing, Grooming, Transfers  Dependent IADLs:: Cleaning, Cooking, Laundry, Shopping, Meal Preparation, Medication Management, Money Management, Transportation    [facility] sponsored benefits: Shared information re: Silver Sneakers/gym memberships, ASA, Calcium +D.    PCA Assessment completed at visit: Yes, will reauthorize 12 units daily PCA     Elderly Waiver Eligibility: Yes-will continue on EW    Care Plan & Recommendations: Will continue with Adult Day Care and PCA services.  These services  are meeting members needs.  Talked some with member about unmanaged depression.  Member states is schedule with PCP in July.  CC encouraged member to schedule sooner.  Member [facility] will do this.  Discontinued Zoloft and Wellbutrin because he said it made him dizzy.      See [facility] for detailed assessment information.    Follow-Up Plan: Member informed of future contact, plan to f/u with member with a 6 month telephone assessment.  Contact information shared with member and family, encouraged member to call with any questions or concerns at any time.    [facility] [place] care continuum providers: Please see Snapshot and Care Management Flowsheets for Specific details of care plan.    This CC note routed to PCP.    Mckenzie Nina RN, BSN, PHN  [facility]  [phone]  Fax: [phone]

## 2022-05-18 ENCOUNTER — PATIENT OUTREACH (OUTPATIENT)
Dept: GERIATRIC MEDICINE | Facility: CLINIC | Age: 77
End: 2022-05-18
Payer: COMMERCIAL

## 2022-05-18 NOTE — PROGRESS NOTES
[facility] Care Coordination Contact    Internal CC change effective 6/1/1022.  Mailed member CC Change letter.  Additional tasks to be completed by CMS include: update database & EPIC, enter CC Change in MMIS, and move member files on Q drive.    Elenita Lowery  Case Management Specialist  [facility]  [phone]

## 2022-05-18 NOTE — LETTER
[facility] Advance Care Planning       Josh Gonzalez  [address] [address]      Dear Man,    You shared with me your interest in receiving information on Advance Care Planning and Health Care Directives. Discussing and making decisions about this part of our health is very important.  A Health Care Directive is a written document that outlines your goals, values, beliefs and choices for health care and medical treatment in the event you are unable to speak for yourself.     We greatly value the opportunity to assist you in documenting your choices and to honor your   wishes. We ve enclosed HCD and Goals worksheet to help you get started thinking about your values and goals. We have several options for additional resources:       Health Care Directives and Advance Care Planning resources can be viewed and printed   for free at our web site:  [email or web address].       Free group classes on Advance Care Planning and completing a Health Care Directive are available at multiple locations and times. These classes are led by trained staff who will provide information and guide you through a Health Care Directive.  They can also review, notarize and add your Health Care Directive to your medical record. [place] for a class at [email or web address] or by calling [facility] at [phone] or toll free [phone].      COPIES of completed Health Care Directives can be brought or mailed to any of our   locations, including the address listed below. You can also email a copy to [email or web address] .      Email or call me at the contact information listed below for questions, assistance, or to   make an appointment to discuss creating a Health Care Directive. You can also contact   our [facility] Department for questions or assistance.       Sincerely,   Mckenzie Nina RN  [facility]  [phone]

## 2022-05-18 NOTE — LETTER
May 18, 2022    Important Medica Information    MAN PIU SG  [address] [facility] [address]  Your Care Plan  Dear Man,  When we spoke recently, I promised to send you a Care Plan. The plan enclosed is a summary of our discussion. It includes the steps we agreed would help you meet your health goals. In addition, I can help you with:  Emgyvaj-Y-GmxeDV  This program is available to members who need a ride to medical and dental visits. To schedule a ride, call [phone] or [phone] (toll free). TTY: 711. You can call 8 a.m. to 8 p.m. Seven days a week. Access to a representative may be limited at times.   One Pass  One Pass is your no-cost, complete, fitness solution for your mind and body. To learn more visit [employer]/fitness or call One Pass, toll-free [phone] (TTY: 717) 8 a.m. to 9 p.m. Monday-Friday.  Health Care Directive   This form helps you outline your health care wishes. You can request a form from me and I will answer any questions you have before you discuss it with your doctor.   Annual Physical  Take a key step on your path to good health and set up an annual physical at your clinic.  Questions?  Call me at [phone] Monday-Friday between 8am and 5pm.  TTY: 711. As we discussed, I plan to be in touch with you again in 6 months to follow up via phone.  Sincerely,      Mckenzie Nina RN, PHN    E-mail: [email or web address]  Phone: [phone]      [facility]      cc: member records                                                                                             CB5 ([facility]) (5-2020)    Civil Rights Notice  Discrimination is against the law. Med[employer] does not discriminate on the basis of any of the following:    Race    Color    National Origin    Creed    Congregational    Age    Public Assistance Status    Receipt of Health Care Services    Disability (including physical or mental impairment)    Sex (including sex stereotypes  and gender identity)    Marital Status    Political Beliefs    Medical Condition    Genetic Information    Sexual Orientation    Claims Experience    Medical History    Health Status    Auxiliary Aids and Services:  Medica provides auxiliary aids and services, like qualified interpreters or information in accessible formats, free of charge and in a timely manner, to ensure an equal opportunity to participate in our health care programs. Contact Medica at PEER/contact medicaid or call [phone] (toll free); TTY:719 or at PEER/contactPanTheryxcaid.    Language Assistance Services:  [employer] provides translated documents and spoken language interpreting, free of charge and in a timely manner, when language assistance services are necessary to ensure limited English speakers have meaningful access to our information and services. Contact [employer] at [phone] (toll free); TTY: 125 or PEER/contactmedicaid.     Civil Rights Complaints  You have the right to file a discrimination complaint if you believe you were treated in a discriminatory way by Medic. You may contact any of the following four agencies directly to file a discrimination complaint.    U.S. Department of Health and Human Services  Office for Civil Rights (OCR)  You have the right to file a complaint with the OCR, a federal agency, if you believe you have been discriminated against because of any of the following:    Race    Disability    Color    Sex    National Origin    Age    Oriental orthodox (in some cases)    Contact the OCR directly to file a complaint:         Director         U.S. Department of Health and Human Services  Office for Civil Rights         [address] [place] [address]         Customer Response Center: Toll-free: [phone]          TDD: [phone]         Email: [email or web address]    [place] Department of Human Rights ([facility])  In [place], you have the  right to file a complaint with the MDHR if you believe you have been discriminated against because of any of the following:      Race    Color    National Origin    Caodaism    Creed    Sex    Sexual Orientation    Marital Status    Public Assistance Status    Disability    Contact the MDHR directly to file a complaint:         [employer]         [address] [address]         [phone] (voice)          [phone] (toll free)         711 or [phone] ([place] Relay)         [phone] (Fax)         [email or web address] (Email)     [place] Department of Human Services (DHS)  You have the right to file a complaint with [facility] if you believe you have been discriminated against in our health care programs because of any of the following:    Race    Color    National Origin    Creed    Caodaism    Age    Public Assistance Status    Receipt of Health Care Services    Disability (including physical or mental impairment)    Sex (including sex stereotypes and gender identity)    Marital Status    Political Beliefs    Medical Condition    Genetic Information    Sexual Orientation    Claims Experience    Medical History    Health Status    Complaints must be in writing and filed within 180 days of the date you discovered the alleged discrimination. The complaint must contain your name and address and describe the discrimination you are complaining about. After we get your complaint, we will review it and notify you in writing about whether we have authority to investigate. If we do, we will investigate the complaint.      [facility] will notify you in writing of the investigation s outcome. You have a right to appeal the outcome if you disagree with the decision. To appeal, you must send a written request to have [facility] review the investigation outcome. Be brief and state why you disagree with the decision. Include additional information you think is  important.      If you file a complaint in this way, the people who work for the agency named in the complaint cannot retaliate against you. This means they cannot punish you in any way for filing a complaint. Filing a complaint in this way does not stop you from seeking out other legal or administration actions.     Contact DHS directly to file a discrimination complaint:        Civil Rights Coordinator        [facility]        Equal Opportunity and Access Division        [address] [address]        [place] [address] [phone] (voice) or use your preferred relay service     [employer]ica Complaint Notice   You have the right to file a complaint with [employer] if you believe you have been discriminated against because of any of the following:       Medical condition    Health status    Receipt of health care services    Claims experience    Medical history    Genetic information    Disability (including mental or physical impairment)    Marital status    Age    Sex (including sex stereotypes and gender identity)    Sexual orientation    National origin    Race    Color    Church    Creed    Public assistance status    Political beliefs    You can file a complaint and ask for help in filing a complaint in person or by mail, phone, fax, or email at:     Medica Civil Rights Coordinator  [facility] [employer]  [address], [place] [address] [phone] (voice and fax) or TTH:696  Email: [email or web address]    American Indians can begin or continue to use [place] and  Health Services (IHS) clinics. We will not require prior approval or impose any conditions for you to get services at these clinics. For elders age 65 years and older this includes Elderly Waiver (EW) services accessed through the [place]. If a doctor or other provider in a [place] or IHS clinic refers you to a provider in our network, we will not require you to see your primary care  provider prior to the referral.

## 2022-05-18 NOTE — LETTER
May 18, 2022    Important Medica Information    MAN PIU [facility]  [address] [facility] [address]    Your New Care Coordinator  Dear Man,  My name is Layla Brewer RN, PHN and I am your new Care Coordinator. You may reach me by calling [phone]. I will be in touch with you shortly to address any questions you may have.   I have also been in contact with VIRGINIA Durbin, [facility], your previous care coordinator, to ensure a smooth transition.  Questions?  Call me at [phone] Monday-Friday between 8am and 5pm. TTY: 711. I look forward to working with you as a [employer]  member.  Sincerely,    Layla Olmstead RN, PHN  [phone]  [email or web address]      cc: member records                                                                                                                        CB5 ([facility]) (5-2020)    Civil Rights Notice  Discrimination is against the law. [employer] does not discriminate on the basis of any of the following:    Race    Color    National Origin    Creed    Advent    Age    Public Assistance Status    Receipt of Health Care Services    Disability (including physical or mental impairment)    Sex (including sex stereotypes and gender identity)    Marital Status    Political Beliefs    Medical Condition    Genetic Information    Sexual Orientation    Claims Experience    Medical History    Health Status    Auxiliary Aids and Services:  Medica provides auxiliary aids and services, like qualified interpreters or information in accessible formats, free of charge and in a timely manner, to ensure an equal opportunity to participate in our health care programs. Contact Medica at [employer]/contact medicaid or call [phone] (toll free); TTY:711 or at [employer]/contactmedicaid.    Language Assistance Services:  [employer] provides translated documents and spoken language interpreting, free of charge and in a timely manner, when language assistance services are  necessary to ensure limited English speakers have meaningful access to our information and services. Contact [employer] at [phone] (toll free); TTY: 711 or [email or web address]/contactmedicaid.     Civil Rights Complaints  You have the right to file a discrimination complaint if you believe you were treated in a discriminatory way by [employer]. You may contact any of the following four agencies directly to file a discrimination complaint.        U.S. Department of Health and Human Services  Office for Civil Rights (OCR)  You have the right to file a complaint with the OCR, a federal agency, if you believe you have been discriminated against because of any of the following:    Race    Disability    Color    Sex    National Origin    Age    Methodist (in some cases)    Contact the OCR directly to file a complaint:         Director         U.S. Department of Health and Human Services  Office for Civil Rights         [address] [place] [address]         Customer Response Center: Toll-free: [phone]          TDD: [phone]         Email: [email or web address]    [place] Department of Human Rights (MDHR)  In [place], you have the right to file a complaint with the MDHR if you believe you have been discriminated against because of any of the following:      Race    Color    National Origin    Methodist    Creed    Sex    Sexual Orientation    Marital Status    Public Assistance Status    Disability    Contact the MDHR directly to file a complaint:         [place] Department of Human Rights         [address] [place] [address]         [phone] (voice)          [phone] (toll free)         711 or [phone] ([place] Relay)         [phone] (Fax)         [email or web address] (Email)     [place] Department of Human Services (DHS)  You have the right to file a complaint with [facility] if you believe you have been  discriminated against in our health care programs because of any of the following:    Race    Color    National Origin    Creed    Cheondoism    Age    Public Assistance Status    Receipt of Health Care Services    Disability (including physical or mental impairment)    Sex (including sex stereotypes and gender identity)    Marital Status    Political Beliefs    Medical Condition    Genetic Information    Sexual Orientation    Claims Experience    Medical History    Health Status    Complaints must be in writing and filed within 180 days of the date you discovered the alleged discrimination. The complaint must contain your name and address and describe the discrimination you are complaining about. After we get your complaint, we will review it and notify you in writing about whether we have authority to investigate. If we do, we will investigate the complaint.      [facility] will notify you in writing of the investigation s outcome. You have a right to appeal the outcome if you disagree with the decision. To appeal, you must send a written request to have [facility] review the investigation outcome. Be brief and state why you disagree with the decision. Include additional information you think is important.      If you file a complaint in this way, the people who work for the agency named in the complaint cannot retaliate against you. This means they cannot punish you in any way for filing a complaint. Filing a complaint in this way does not stop you from seeking out other legal or administration actions.     Contact [facility] directly to file a discrimination complaint:        Civil Rights Coordinator        [place] Department of Human Services        Equal Opportunity and Access Division        [address] [address]        [place] [address] [phone] (voice) or use your preferred relay service     Medica Complaint Notice   You have the right to file a complaint with [employer] if you believe you have been discriminated against  because of any of the following:       Medical condition    Health status    Receipt of health care services    Claims experience    Medical history    Genetic information    Disability (including mental or physical impairment)    Marital status    Age    Sex (including sex stereotypes and gender identity)    Sexual orientation    National origin    Race    Color    Latter-day    Creed    Public assistance status    Political beliefs    You can file a complaint and ask for help in filing a complaint in person or by mail, phone, fax, or email at:     Medica Civil Rights Coordinator  [employer]  [address], [place] [address] [phone] (voice and fax) or TTY:991  Email: [email or web address]    American Indians can begin or continue to use [place] and [place] Health Services (IHS) clinics. We will not require prior approval or impose any conditions for you to get services at these clinics. For elders age 65 years and older this includes Elderly Waiver (EW) services accessed through the [place]. If a doctor or other provider in a [place] or IHS clinic refers you to a provider in our network, we will not require you to see your primary care provider prior to the referral.

## 2022-05-18 NOTE — LETTER
[facility]  [address], [address]  [place] [address]  Phone:  [phone]  Fax:  [phone]      May 18, 2022    YOGI MCKAY  [address] [place] [address]      Dear Yogi,     Enclosed is a caregiver assessment as you are a person who provides assistance to MAN [facility] on a regular basis.    Please complete and return the assessment in the self-addressed stamped envelope provided at your earliest convenience.  If you have questions about the form, please feel free to call me at [phone].  Thank you.    Sincerely,      Mckenzie Nina RN, PHN    E-mail: [email or web address]  Phone: [phone]      [facility]              Enclosures:   Caregiver assessment     Self-addressed stamped envelope

## 2022-05-18 NOTE — PROGRESS NOTES
[facility] Care Coordination Contact    Received after visit chart from care coordinator.  Completed following tasks: Mailed copy of care plan to client, Updated services in Database, Submitted referrals/auths for PCA and ADC, Mailed copy of POC signature sheet for member to sign and return in SASE  and Mailed PCA sig page to member and Medica disposal form. Also mailed HCD with goals worksheet.  , Per CC, mailed LTCC caregiver assessment to Daughter Yogi along with self-addressed, stamped return envelope.  , Provider Signature - No POC Shared:  Member indicates that they do not want their POC shared with any EW providers.     and [employer]ica:  Faxed completed PCA assessment to PCA Agency and mailed copies to member.  Faxed MD Communication to PCP.  Emailed referral form for auth to [employer].    Ilana Vazquez  Care Management Specialist  [facility]  [phone]

## 2022-05-19 ENCOUNTER — TELEPHONE (OUTPATIENT)
Dept: INTERNAL MEDICINE | Facility: CLINIC | Age: 77
End: 2022-05-19
Payer: COMMERCIAL

## 2022-05-20 ENCOUNTER — MEDICAL CORRESPONDENCE (OUTPATIENT)
Dept: HEALTH INFORMATION MANAGEMENT | Facility: CLINIC | Age: 77
End: 2022-05-20

## 2022-07-25 ENCOUNTER — OFFICE VISIT (OUTPATIENT)
Dept: INTERNAL MEDICINE | Facility: CLINIC | Age: 77
End: 2022-07-25
Payer: COMMERCIAL

## 2022-07-25 VITALS
RESPIRATION RATE: 16 BRPM | BODY MASS INDEX: 21.25 KG/M2 | WEIGHT: 124.5 LBS | OXYGEN SATURATION: 97 % | DIASTOLIC BLOOD PRESSURE: 75 MMHG | HEIGHT: 64 IN | TEMPERATURE: 98 F | HEART RATE: 66 BPM | SYSTOLIC BLOOD PRESSURE: 150 MMHG

## 2022-07-25 DIAGNOSIS — F33.42 RECURRENT MAJOR DEPRESSIVE DISORDER, IN FULL REMISSION (H): ICD-10-CM

## 2022-07-25 DIAGNOSIS — I10 PRIMARY HYPERTENSION: ICD-10-CM

## 2022-07-25 DIAGNOSIS — G47.33 OSA (OBSTRUCTIVE SLEEP APNEA): ICD-10-CM

## 2022-07-25 DIAGNOSIS — R41.3 MEMORY DEFICITS: ICD-10-CM

## 2022-07-25 DIAGNOSIS — Z13.220 SCREENING FOR HYPERLIPIDEMIA: ICD-10-CM

## 2022-07-25 DIAGNOSIS — E78.5 HYPERLIPIDEMIA LDL GOAL <160: ICD-10-CM

## 2022-07-25 DIAGNOSIS — E11.9 TYPE 2 DIABETES MELLITUS WITHOUT COMPLICATION, WITHOUT LONG-TERM CURRENT USE OF INSULIN (H): Primary | ICD-10-CM

## 2022-07-25 DIAGNOSIS — R29.898 LEFT LEG WEAKNESS: ICD-10-CM

## 2022-07-25 DIAGNOSIS — Z11.59 NEED FOR HEPATITIS C SCREENING TEST: ICD-10-CM

## 2022-07-25 LAB
CREAT UR-MCNC: 21 MG/DL
ERYTHROCYTE [DISTWIDTH] IN BLOOD BY AUTOMATED COUNT: 14.5 % (ref 10–15)
HBA1C MFR BLD: 5.9 % (ref 0–5.6)
HCT VFR BLD AUTO: 44 % (ref 40–53)
HGB BLD-MCNC: 13.8 G/DL (ref 13.3–17.7)
MCH RBC QN AUTO: 24.3 PG (ref 26.5–33)
MCHC RBC AUTO-ENTMCNC: 31.4 G/DL (ref 31.5–36.5)
MCV RBC AUTO: 78 FL (ref 78–100)
MICROALBUMIN UR-MCNC: 14 MG/L
MICROALBUMIN/CREAT UR: 66.67 MG/G CR (ref 0–17)
PLATELET # BLD AUTO: 270 10E3/UL (ref 150–450)
RBC # BLD AUTO: 5.68 10E6/UL (ref 4.4–5.9)
WBC # BLD AUTO: 6 10E3/UL (ref 4–11)

## 2022-07-25 PROCEDURE — 84443 ASSAY THYROID STIM HORMONE: CPT | Performed by: INTERNAL MEDICINE

## 2022-07-25 PROCEDURE — 36415 COLL VENOUS BLD VENIPUNCTURE: CPT | Performed by: INTERNAL MEDICINE

## 2022-07-25 PROCEDURE — 82043 UR ALBUMIN QUANTITATIVE: CPT | Performed by: INTERNAL MEDICINE

## 2022-07-25 PROCEDURE — 80053 COMPREHEN METABOLIC PANEL: CPT | Performed by: INTERNAL MEDICINE

## 2022-07-25 PROCEDURE — 80061 LIPID PANEL: CPT | Performed by: INTERNAL MEDICINE

## 2022-07-25 PROCEDURE — 99214 OFFICE O/P EST MOD 30 MIN: CPT | Performed by: INTERNAL MEDICINE

## 2022-07-25 PROCEDURE — 83036 HEMOGLOBIN GLYCOSYLATED A1C: CPT | Performed by: INTERNAL MEDICINE

## 2022-07-25 PROCEDURE — 85027 COMPLETE CBC AUTOMATED: CPT | Performed by: INTERNAL MEDICINE

## 2022-07-25 RX ORDER — LOSARTAN POTASSIUM 25 MG/1
25 TABLET ORAL DAILY
Qty: 90 TABLET | Refills: 3 | Status: SHIPPED | OUTPATIENT
Start: 2022-07-25 | End: 2023-09-15

## 2022-07-25 NOTE — PROGRESS NOTES
Assessment & Plan        Screening for hyperlipidemia  Consider statin   - Lipid panel reflex to direct LDL Non-fasting    Recurrent major depressive disorder, in full remission (H)  Off treatment and stable     Type 2 diabetes mellitus without complication, without long-term current use of insulin (H)  Assess lab   - HEMOGLOBIN A1C  - Albumin Random Urine Quantitative with Creat Ratio    KULWANT (obstructive sleep apnea)  Cont CPAP     Hyperlipidemia LDL goal <160  Assess lab     Primary hypertension  Start treatment , discussed side effects   - CBC with platelets  - Comprehensive metabolic panel (BMP + Alb, Alk Phos, ALT, AST, Total. Bili, TP)  - TSH with free T4 reflex  - losartan (COZAAR) 25 MG tablet; Take 1 tablet (25 mg) by mouth daily    Left leg weakness  Assess brain MRI , objectively no neurologic deficits   - MR Brain w/o Contrast; Future    Memory deficits  Assess brain MRI   - MR Brain w/o Contrast; Future             See Patient Instructions    Return in about 6 months (around 1/25/2023) for Routine Visit.    Ray Sun MD  [facility]    Subjective   Man is a 76 year old accompanied by his daughter, presenting for the following health issues:  RECHECK    Interpretor used over phone     HPI     Concern for low energy, tired, even with sleeping.   Has lost weight 3-4 lbs. Has average appetite.   Reports left leg weakness and feels changes of sensation, numbness of the leg and foot for 3 months.   Able to walk. The left leg does bother him with weakness, has to stop to take a break.   Has H/O DM. On diet , exercise. Blood sugars are controlled. No parestesias. No hypoglycemias.  Has h/o depression.off  medical treatment, controlle. No depressive symptoms or suicidal ideation.  Concern for short term memory impairment.   Has h/o KULWANT , uses a CPAP.     Review of Systems   Constitutional, HEENT, cardiovascular, pulmonary, GI, , musculoskeletal, neuro, skin, endocrine and  "psych systems are negative, except as otherwise noted.      Objective    BP (!) 150/75 (BP Location: Left arm, Patient Position: Chair, Cuff Size: Adult Regular)   Pulse 66   Temp 98  F (36.7  C) (Tympanic)   Resp 16   Ht 1.63 m (5' 4.17\")   Wt 56.5 kg (124 lb 8 oz)   SpO2 97%   BMI 21.26 kg/m    Body mass index is 21.26 kg/m .  Physical Exam   GENERAL: healthy, alert and no distress  EYES: Eyes grossly normal to inspection, PERRL and conjunctivae and sclerae normal  HENT: ear canals and TM's normal, nose and mouth without ulcers or lesions  NECK: no adenopathy, no asymmetry, masses, or scars and thyroid normal to palpation  RESP: lungs clear to auscultation - no rales, rhonchi or wheezes  CV: regular rate and rhythm, normal S1 S2, no S3 or S4, no murmur, click or rub, no peripheral edema and peripheral pulses strong  ABDOMEN: soft, nontender, no hepatosplenomegaly, no masses and bowel sounds normal  MS: no gross musculoskeletal defects noted, no edema  SKIN: no suspicious lesions or rashes  NEURO: Normal strength and tone, mentation intact and speech normal, slightly unstable gait.     Admission on 04/30/2022, Discharged on 04/30/2022   Component Date Value Ref Range Status     Influenza A PCR 04/30/2022 Negative  Negative Final     Influenza B PCR 04/30/2022 Negative  Negative Final     RSV PCR 04/30/2022 Negative  Negative Final     SARS CoV2 PCR 04/30/2022 Positive (A) Negative Final    POSITIVE: SARS-CoV-2 (COVID-19) RNA detected, presumed positive.     Hold Specimen 04/30/2022 [facility]   Final     Hold Specimen 04/30/2022 [facility]   Final     Hold Specimen 04/30/2022 [facility]   Final     Sodium 04/30/2022 131 (A) 133 - 144 mmol/L Final     Potassium 04/30/2022 3.6  3.4 - 5.3 mmol/L Final    Specimen slightly hemolyzed, potassium may be falsely elevated.     Chloride 04/30/2022 97  94 - 109 mmol/L Final     Carbon Dioxide (CO2) 04/30/2022 29  20 - 32 mmol/L Final     Anion Gap 04/30/2022 5  3 - 14 mmol/L Final     Urea " Nitrogen 04/30/2022 15  7 - 30 mg/dL Final     Creatinine 04/30/2022 0.98  0.66 - 1.25 mg/dL Final     Calcium 04/30/2022 8.2 (A) 8.5 - 10.1 mg/dL Final     Glucose 04/30/2022 186 (A) 70 - 99 mg/dL Final     GFR Estimate 04/30/2022 79  >60 mL/min/1.73m2 Final    Effective December 21, 2021 eGFRcr in adults is calculated using the 2021 CKD-EPI creatinine equation which includes age and gender (Guerda et al., NE, DOI: 10.1056/HVCPzi3267480)     Color Urine 04/30/2022 Light Yellow  Colorless, Straw, Light Yellow, Yellow Final     Appearance Urine 04/30/2022 Clear  Clear Final     Glucose Urine 04/30/2022 70  (A) Negative mg/dL Final     Bilirubin Urine 04/30/2022 Negative  Negative Final     Ketones Urine 04/30/2022 Trace (A) Negative mg/dL Final     Specific Gravity Urine 04/30/2022 1.010  1.003 - 1.035 Final     Blood Urine 04/30/2022 Small (A) Negative Final     pH Urine 04/30/2022 6.0  5.0 - 7.0 Final     Protein Albumin Urine 04/30/2022 30  (A) Negative mg/dL Final     Urobilinogen Urine 04/30/2022 Normal  Normal, 2.0 mg/dL Final     Nitrite Urine 04/30/2022 Negative  Negative Final     Leukocyte Esterase Urine 04/30/2022 Negative  Negative Final     Bacteria Urine 04/30/2022 Few (A) None Seen /HPF Final     RBC Urine 04/30/2022 1  <=2 /HPF Final     WBC Urine 04/30/2022 1  <=5 /HPF Final     WBC Count 04/30/2022 11.6 (A) 4.0 - 11.0 10e3/uL Final     RBC Count 04/30/2022 5.65  4.40 - 5.90 10e6/uL Final     Hemoglobin 04/30/2022 13.7  13.3 - 17.7 g/dL Final     Hematocrit 04/30/2022 44.4  40.0 - 53.0 % Final     MCV 04/30/2022 79  78 - 100 fL Final     MCH 04/30/2022 24.2 (A) 26.5 - 33.0 pg Final     MCHC 04/30/2022 30.9 (A) 31.5 - 36.5 g/dL Final     RDW 04/30/2022 14.4  10.0 - 15.0 % Final     Platelet Count 04/30/2022 202  150 - 450 10e3/uL Final     % Neutrophils 04/30/2022 83  % Final     % Lymphocytes 04/30/2022 8  % Final     % Monocytes 04/30/2022 8  % Final     % Eosinophils 04/30/2022 0  % Final     %  Basophils 04/30/2022 0  % Final     % Immature Granulocytes 04/30/2022 1  % Final     NRBCs per 100 WBC 04/30/2022 0  <1 /100 Final     Absolute Neutrophils 04/30/2022 9.6 (A) 1.6 - 8.3 10e3/uL Final     Absolute Lymphocytes 04/30/2022 1.0  0.8 - 5.3 10e3/uL Final     Absolute Monocytes 04/30/2022 0.9  0.0 - 1.3 10e3/uL Final     Absolute Eosinophils 04/30/2022 0.0  0.0 - 0.7 10e3/uL Final     Absolute Basophils 04/30/2022 0.0  0.0 - 0.2 10e3/uL Final     Absolute Immature Granulocytes 04/30/2022 0.1  <=0.4 10e3/uL Final     Absolute NRBCs 04/30/2022 0.0  10e3/uL Final                   .  ..

## 2022-07-27 LAB
ALBUMIN SERPL-MCNC: 3.4 G/DL (ref 3.4–5)
ALP SERPL-CCNC: 65 U/L (ref 40–150)
ALT SERPL W P-5'-P-CCNC: 24 U/L (ref 0–70)
ANION GAP SERPL CALCULATED.3IONS-SCNC: 7 MMOL/L (ref 3–14)
AST SERPL W P-5'-P-CCNC: 23 U/L (ref 0–45)
BILIRUB SERPL-MCNC: 0.4 MG/DL (ref 0.2–1.3)
BUN SERPL-MCNC: 17 MG/DL (ref 7–30)
CALCIUM SERPL-MCNC: 8.8 MG/DL (ref 8.5–10.1)
CHLORIDE BLD-SCNC: 101 MMOL/L (ref 94–109)
CHOLEST SERPL-MCNC: 189 MG/DL
CO2 SERPL-SCNC: 27 MMOL/L (ref 20–32)
CREAT SERPL-MCNC: 1.07 MG/DL (ref 0.66–1.25)
FASTING STATUS PATIENT QL REPORTED: NO
GFR SERPL CREATININE-BSD FRML MDRD: 71 ML/MIN/1.73M2
GLUCOSE BLD-MCNC: 81 MG/DL (ref 70–99)
HDLC SERPL-MCNC: 43 MG/DL
LDLC SERPL CALC-MCNC: 108 MG/DL
NONHDLC SERPL-MCNC: 146 MG/DL
POTASSIUM BLD-SCNC: 4.4 MMOL/L (ref 3.4–5.3)
PROT SERPL-MCNC: 7 G/DL (ref 6.8–8.8)
SODIUM SERPL-SCNC: 135 MMOL/L (ref 133–144)
TRIGL SERPL-MCNC: 190 MG/DL
TSH SERPL DL<=0.005 MIU/L-ACNC: 1.35 MU/L (ref 0.4–4)

## 2022-08-05 ENCOUNTER — HOSPITAL ENCOUNTER (OUTPATIENT)
Dept: MRI IMAGING | Facility: CLINIC | Age: 77
Discharge: HOME | End: 2022-08-05
Attending: INTERNAL MEDICINE | Admitting: INTERNAL MEDICINE
Payer: COMMERCIAL

## 2022-08-05 DIAGNOSIS — R41.3 MEMORY DEFICITS: ICD-10-CM

## 2022-08-05 DIAGNOSIS — R29.898 LEFT LEG WEAKNESS: ICD-10-CM

## 2022-08-05 PROCEDURE — A9585 GADOBUTROL INJECTION: HCPCS | Performed by: INTERNAL MEDICINE

## 2022-08-05 PROCEDURE — 255N000002 HC RX 255 OP 636: Performed by: INTERNAL MEDICINE

## 2022-08-05 PROCEDURE — 70553 MRI BRAIN STEM W/O & W/DYE: CPT

## 2022-08-05 RX ORDER — GADOBUTROL 604.72 MG/ML
5.5 INJECTION INTRAVENOUS ONCE
Status: COMPLETED | OUTPATIENT
Start: 2022-08-05 | End: 2022-08-05

## 2022-08-05 RX ADMIN — GADOBUTROL 5.5 ML: 604.72 INJECTION INTRAVENOUS at 10:28

## 2022-08-05 NOTE — LETTER
August 9, 2022      Josh Gonzalez  [address] [facility] [address]        Dear ,    We are writing to inform you of your test results.    Your MRI shows no brain tumors, no evidence of stroke.   Chronic changes related to age.       Resulted Orders   MR Brain w/o & w Contrast    Narrative    MRI BRAIN WITHOUT AND WITH CONTRAST  8/5/2022 11:01 AM     HISTORY:  Left leg weakness. Memory deficits.     TECHNIQUE:  Multiplanar, multisequence MRI of the brain without and  with 5.5 mL Gadavist.     COMPARISON: MRI of the brain 3/23/2015.     FINDINGS: No abnormal discrete restricted diffusion identified to  suggest recent infarct. The ventricles are normal in size and  configuration. Mild generalized brain parenchymal volume loss. Mild  scattered patchy nonspecific T2 FLAIR hyperintense foci in the  cerebral white matter, likely representing sequela of chronic small  vessel ischemic change. No intracranial hemorrhage, extra axial fluid  collection, intracranial mass lesion, or mass effect/herniation. No  abnormal intracranial postcontrast enhancement is seen. The major  arterial flow voids of the skull base are grossly maintained.    Bilateral lens implants. Mild mucosal thickening in the right  maxillary sinus. Trace mucosal thickening in the ethmoid sinuses.  Small nonspecific enhancing raised skin-based nodular lesion/papule  overlying the lateral aspect of the left supraorbital ridge measuring  approximately 7 mm (series 8 image 14). This overall appears similar  to the prior MRI exam. The calvarium, skull base, and midface  otherwise appear unremarkable.      Impression    IMPRESSION:  1. No acute intracranial process.  2. Mild generalized brain parenchymal volume loss and presumed chronic  small vessel ischemic changes. This overall appears similar to the  prior study.  3. Redemonstrated small nonspecific enhancing skin-based nodule  overlying the lateral aspect of the left supraorbital ridge.  Recommend  correlation with direct inspection.    FLACO MARIN MD         SYSTEM ID:  [id number]       If you have any questions or concerns, please call the clinic at the number listed above.       Sincerely,      Ray Sun MD        monica

## 2022-08-18 ENCOUNTER — PATIENT OUTREACH (OUTPATIENT)
Dept: GERIATRIC MEDICINE | Facility: CLINIC | Age: 77
End: 2022-08-18

## 2022-08-18 NOTE — PROGRESS NOTES
CC updated program tasks and targets for [employer] launch.  Layla Brewer RN,  N  [facility] Coordinator  [phone]

## 2022-10-07 NOTE — PROGRESS NOTES
[facility] Care Coordination Contact      [facility] Six-Month Telephone Assessment    6 month telephone assessment completed on 1/30/19 with daughter Yogi.    ER visits: No  Hospitalizations: No  TCU stays: No  Significant health status changes: Daughter reports that member is getting more forgetful.  Also complains of pain.  He doesn't like to much at home due to pain.  He still goes to [facility] however.  Falls/Injuries: No  ADL/IADL changes: No  Changes in services: No    Caregiver Assessment follow up:  NA    Goals: See POC in chart for goal progress documentation.      Will see member in 6 months for an annual health risk assessment.   Encouraged member to call CC with any questions or concerns in the meantime.   VIRGINIA Durbin, CCM  [facility] Care Coordinator  Tel [phone]  Fax [phone]            
Chart reviewed, pt. seen/evaluated with Mae Dia NP virtually via televideo platform on 10/8. I agree with above assessment/plan. Patient AAOX1-2, calm/cooperative, denies AVH, denies tactile hallucinations, denies SI. Plan as above. Will follow

## 2022-11-08 ENCOUNTER — PATIENT OUTREACH (OUTPATIENT)
Dept: GERIATRIC MEDICINE | Facility: CLINIC | Age: 77
End: 2022-11-08

## 2022-11-08 NOTE — PROGRESS NOTES
[facility] Care Coordination Contact      [facility] Six-Month Telephone Assessment    6 month telephone assessment completed on 11/8/22 with Cantonese  to assist.     ER visits: No  Hospitalizations: No  TCU stays: No  Significant health status changes: No   Falls/Injuries: Yes: Several falls without injury.  Uses cane as needed.  Son must help him get out of the chair sometimes.   ADL/IADL changes: No  Changes in services: No    Caregiver Assessment follow up: Member reports he received a CPap in 2014. He has not had any new tubing or mask and asked if this CC could help with finding supplies.   This CC will look back into see where this CPap came from.   The Member likes the adult day care 3 x weekly. He had the flu shot at the adult day care. He also reports he had cataract surgery in the past year. He states he feels safe and likes his current POC.    Goals: See POC in chart for goal progress documentation.     Will see member in 6 months for an annual health risk assessment.   Encouraged member to call CC with any questions or concerns in the meantime.     Layla Brewer RN,  PHN  [facility] Care Coordinator  [phone]    11/9/22   Called [facility] about the CPap. There was a referral from the PCP in 2019. Left a message with the member's information and request.

## 2023-03-20 ENCOUNTER — PATIENT OUTREACH (OUTPATIENT)
Dept: GERIATRIC MEDICINE | Facility: CLINIC | Age: 78
End: 2023-03-20
Payer: COMMERCIAL

## 2023-03-20 NOTE — PROGRESS NOTES
Encounter opened due to Regulatory [employer] Jessica Update to open FVP Program.    Elenita Lowery  Case Management Specialist  [facility]  [phone]

## 2023-03-20 NOTE — PROGRESS NOTES
Encounter opened due to Regulatory [employer] Update to close FVP Program.    Elenita Lowery  Case Management Specialist  [facility]  [phone]

## 2023-03-29 ENCOUNTER — PATIENT OUTREACH (OUTPATIENT)
Dept: GERIATRIC MEDICINE | Facility: CLINIC | Age: 78
End: 2023-03-29
Payer: COMMERCIAL

## 2023-03-29 NOTE — PROGRESS NOTES
[facility] Care Coordination Contact    Called  Sruthi Khan  to schedule annual HRA home visit. HRA has been scheduled for Mon, April 3, 2023 at 2:00 .   Notified [facility]  Services of the appointment.   Layla Brewer RN,  PHN  [facility] Care Coordinator  [phone]

## 2023-04-03 ENCOUNTER — PATIENT OUTREACH (OUTPATIENT)
Dept: GERIATRIC MEDICINE | Facility: CLINIC | Age: 78
End: 2023-04-03
Payer: COMMERCIAL

## 2023-04-03 SDOH — ECONOMIC STABILITY: FOOD INSECURITY: WITHIN THE PAST 12 MONTHS, YOU WORRIED THAT YOUR FOOD WOULD RUN OUT BEFORE YOU GOT MONEY TO BUY MORE.: NEVER TRUE

## 2023-04-03 SDOH — ECONOMIC STABILITY: FOOD INSECURITY: WITHIN THE PAST 12 MONTHS, THE FOOD YOU BOUGHT JUST DIDN'T LAST AND YOU DIDN'T HAVE MONEY TO GET MORE.: NEVER TRUE

## 2023-04-03 SDOH — ECONOMIC STABILITY: INCOME INSECURITY: IN THE LAST 12 MONTHS, WAS THERE A TIME WHEN YOU WERE NOT ABLE TO PAY THE MORTGAGE OR RENT ON TIME?: NO

## 2023-04-03 SDOH — HEALTH STABILITY: PHYSICAL HEALTH: ON AVERAGE, HOW MANY MINUTES DO YOU ENGAGE IN EXERCISE AT THIS LEVEL?: 0 MIN

## 2023-04-03 SDOH — HEALTH STABILITY: PHYSICAL HEALTH: ON AVERAGE, HOW MANY DAYS PER WEEK DO YOU ENGAGE IN MODERATE TO STRENUOUS EXERCISE (LIKE A BRISK WALK)?: 0 DAYS

## 2023-04-03 ASSESSMENT — SOCIAL DETERMINANTS OF HEALTH (SDOH)
HOW HARD IS IT FOR YOU TO PAY FOR THE VERY BASICS LIKE FOOD, HOUSING, MEDICAL CARE, AND HEATING?: NOT HARD AT ALL
IN A TYPICAL WEEK, HOW MANY TIMES DO YOU TALK ON THE PHONE WITH FAMILY, FRIENDS, OR NEIGHBORS?: MORE THAN THREE TIMES A WEEK
DO YOU BELONG TO ANY CLUBS OR ORGANIZATIONS SUCH AS CHURCH GROUPS UNIONS, FRATERNAL OR ATHLETIC GROUPS, OR SCHOOL GROUPS?: NO
HOW OFTEN DO YOU ATTENT MEETINGS OF THE CLUB OR ORGANIZATION YOU BELONG TO?: NEVER
HOW OFTEN DO YOU GET TOGETHER WITH FRIENDS OR RELATIVES?: MORE THAN THREE TIMES A WEEK
HOW OFTEN DO YOU ATTEND CHURCH OR RELIGIOUS SERVICES?: NEVER

## 2023-04-03 ASSESSMENT — LIFESTYLE VARIABLES
HOW OFTEN DO YOU HAVE A DRINK CONTAINING ALCOHOL: NEVER
HOW OFTEN DO YOU HAVE SIX OR MORE DRINKS ON ONE OCCASION: NEVER
AUDIT-C TOTAL SCORE: 0
SKIP TO QUESTIONS 9-10: 1
HOW MANY STANDARD DRINKS CONTAINING ALCOHOL DO YOU HAVE ON A TYPICAL DAY: PATIENT DOES NOT DRINK

## 2023-04-03 NOTE — Clinical Note
Hi Dr Sun,  I was able to complete the annual assessment in the home. Man is stable but would benefit from a shower chair with a back. He currently sits on a plastic step stool which is too low. There has been a shortage of Ensure so he has not had his supplement.  The suppliers hope there will be some available in April. His wife reports he may have lost about 10 pounds the past year. He will continue with the adult day care and PCA services.  No change.  Thanks,  Layla

## 2023-04-04 NOTE — PROGRESS NOTES
[facility] Care Coordination Contact    [facility] Home Visit Assessment     Home visit for Health Risk Assessment with Josh Gonzalez completed on April 3, 2023    Type of residence:: Private home - stairs  Current living arrangement:: I live in a private home with family     Assessment completed with:: Patient, Spouse or significant other, Family, Other ()    Current Care Plan  Member currently receiving the following home care services:     Member currently receiving the following community resources: PCA, County Programs, Day Care    Medication Review  Medication reconciliation completed in Epic: Yes  Medication set-up & administration: Independent and sets up on own weekly.  Family caregiver administers medications.  Medication Risk Assessment Medication (1 or more, place referral to MTM): N/A: No risk factors identified  MTM Referral Placed: No: No risk factors idenified    Mental/Behavioral Health   Depression Screening: Unable due to language barrier   Mental health DX:: depression, stable    Falls Assessment:   Fallen 2 or more times in the past year?: Yes   Any fall with injury in the past year?: No    ADL/IADL Dependencies:   Dependent ADLs:: Ambulation-cane, Bathing, Dressing, Grooming, Incontinence, transfers       [facility] Plan sponsored benefits: Shared information re: Silver Sneakers/gym memberships, ASA, Calcium +D.    PCA Assessment completed at visit: Yes Annual PCA assessment indicated 12 units per day of PCA. This is the same as the previous assessment.     Elderly Waiver Eligibility: Yes-will continue on EW    Care Plan & Recommendations:Would like to add a shower chair.  He now uses a plastic step stool or just stands.  He is unsteady.and showering does not look safe.   See CC for detailed assessment information.    Follow-Up Plan: Member informed of future contact, plan to f/u with member with a 6 month telephone assessment.  Contact information shared with member  and family, encouraged member to call with any questions or concerns at any time.    [place] care continuum providers: Please see Snapshot and Care Management Flowsheets for Specific details of care plan.    This CC note routed to PCP.    Layla Brewer RN,  PHN  [facility] Care Coordinator  [phone]

## 2023-04-05 ENCOUNTER — TELEPHONE (OUTPATIENT)
Dept: INTERNAL MEDICINE | Facility: CLINIC | Age: 78
End: 2023-04-05
Payer: COMMERCIAL

## 2023-04-06 ENCOUNTER — PATIENT OUTREACH (OUTPATIENT)
Dept: GERIATRIC MEDICINE | Facility: CLINIC | Age: 78
End: 2023-04-06
Payer: COMMERCIAL

## 2023-04-06 NOTE — PROGRESS NOTES
[facility] Care Coordination Contact    Received after visit chart from care coordinator.  Completed following tasks: Mailed copy of care plan to client, Updated services in Database, Mailed copy of the signed POC to member, Mailed Safe Medication Disposal  and Mailed a copy of signed PCA  To member  , Provider Signature - No POC Shared:  Member indicates that they do not want their POC shared with any EW providers.     and [facility]:  Faxed completed PCA assessment to PCA Agency and mailed copies to member.  Faxed MD Communication to PCP.  Emailed referral form for auth to [employer].      Jacinda Rodriguez  Care Management Specialist  [facility]  [phone]

## 2023-04-06 NOTE — LETTER
April 6, 2023    Important [employer] Information    [facility]  [address] [facility] [address]  Your Care Plan  Dear Josh,  When we spoke recently, I promised to send you a Care Plan. The plan enclosed is a summary of our discussion. It includes the steps we agreed would help you meet your health goals. In addition, I can help you with:  Kqcteiq-U-[id number]  This program is available to members who need a ride to medical and dental visits. To schedule a ride, call [phone] or [phone] (toll free). TTY: 711. You can call Monday - Thursday 8 a.m. to 5 p.m. and Fridays 9 a.m. to 5 p.m.  One Pass  One Pass is your no-cost, complete, fitness solution for your mind and body. To learn more visit Intense/fitness or call One Pass, toll-free [phone] (TTY: 711) 8 a.m. to 9 p.m. Monday-Friday.  Health Care Directive   This form helps you outline your health care wishes. You can request a form from me and I will answer any questions you have before you discuss it with your doctor.   Annual Physical  Take a key step on your path to good health and set up an annual physical at your clinic.  Questions?  Call me at [phone] Monday-Friday between 8am and 5pm.  TTY: 711. As we discussed, I plan to be in touch with you again in 6 months to follow up via phone.  Sincerely,    Layla Olmstead RN, PHN  [phone]  [email or web address]    cc: member records

## 2023-04-07 ENCOUNTER — MEDICAL CORRESPONDENCE (OUTPATIENT)
Dept: HEALTH INFORMATION MANAGEMENT | Facility: CLINIC | Age: 78
End: 2023-04-07

## 2023-04-10 ENCOUNTER — TELEPHONE (OUTPATIENT)
Dept: INTERNAL MEDICINE | Facility: CLINIC | Age: 78
End: 2023-04-10
Payer: COMMERCIAL

## 2023-04-10 NOTE — TELEPHONE ENCOUNTER
CERTIFIED MEDICAL NECESSITY; received via fax. Orders/Forms/Letters in your mailbox to be signed.

## 2023-04-12 ENCOUNTER — MEDICAL CORRESPONDENCE (OUTPATIENT)
Dept: HEALTH INFORMATION MANAGEMENT | Facility: CLINIC | Age: 78
End: 2023-04-12

## 2023-04-19 NOTE — PROGRESS NOTES
Per Steven, CC notified. Shower chair & wipes.    Client's order is scheduled to ship 4/19/23 and should arrive within 2-3 business days.  Elenita Lowery  Case Management Specialist  [facility]  [phone]

## 2023-06-14 ENCOUNTER — TELEPHONE (OUTPATIENT)
Dept: INTERNAL MEDICINE | Facility: CLINIC | Age: 78
End: 2023-06-14
Payer: COMMERCIAL

## 2023-06-14 NOTE — TELEPHONE ENCOUNTER
[facility] received via fax     Forms in  mail box for review and signature.   Appointment notes from 2022 included, there are no appointment notes from 2023

## 2023-08-18 ENCOUNTER — TRANSFERRED RECORDS (OUTPATIENT)
Dept: MULTI SPECIALTY CLINIC | Facility: CLINIC | Age: 78
End: 2023-08-18

## 2023-08-18 LAB — RETINOPATHY: NORMAL

## 2023-09-13 ENCOUNTER — TELEPHONE (OUTPATIENT)
Dept: INTERNAL MEDICINE | Facility: CLINIC | Age: 78
End: 2023-09-13

## 2023-09-13 ENCOUNTER — APPOINTMENT (OUTPATIENT)
Dept: LAB | Facility: CLINIC | Age: 78
End: 2023-09-13
Payer: COMMERCIAL

## 2023-09-15 ENCOUNTER — VIRTUAL VISIT (OUTPATIENT)
Dept: INTERNAL MEDICINE | Facility: CLINIC | Age: 78
End: 2023-09-15
Payer: COMMERCIAL

## 2023-09-15 DIAGNOSIS — M17.0 PRIMARY OSTEOARTHRITIS OF BOTH KNEES: ICD-10-CM

## 2023-09-15 DIAGNOSIS — I10 PRIMARY HYPERTENSION: ICD-10-CM

## 2023-09-15 DIAGNOSIS — F33.41 RECURRENT MAJOR DEPRESSIVE DISORDER, IN PARTIAL REMISSION (H): ICD-10-CM

## 2023-09-15 DIAGNOSIS — G89.29 CHRONIC PAIN OF BOTH SHOULDERS: ICD-10-CM

## 2023-09-15 DIAGNOSIS — M25.512 CHRONIC PAIN OF BOTH SHOULDERS: ICD-10-CM

## 2023-09-15 DIAGNOSIS — E11.9 TYPE 2 DIABETES MELLITUS WITHOUT COMPLICATION, WITHOUT LONG-TERM CURRENT USE OF INSULIN (H): Primary | ICD-10-CM

## 2023-09-15 DIAGNOSIS — M25.511 CHRONIC PAIN OF BOTH SHOULDERS: ICD-10-CM

## 2023-09-15 PROCEDURE — 99214 OFFICE O/P EST MOD 30 MIN: CPT | Mod: VID | Performed by: INTERNAL MEDICINE

## 2023-09-15 RX ORDER — ACETAMINOPHEN 325 MG/1
650 TABLET ORAL EVERY 6 HOURS PRN
Qty: 100 TABLET | Refills: 3 | Status: SHIPPED | OUTPATIENT
Start: 2023-09-15

## 2023-09-15 RX ORDER — LOSARTAN POTASSIUM 25 MG/1
25 TABLET ORAL DAILY
Qty: 90 TABLET | Refills: 3 | Status: SHIPPED | OUTPATIENT
Start: 2023-09-15 | End: 2024-04-25

## 2023-09-15 NOTE — PROGRESS NOTES
Man is a 77 year old who is being evaluated via a billable video visit.      How would you like to obtain your AVS? Mail a copy  If the video visit is dropped, the invitation should be resent by: Text to cell phone: [phone]  Will anyone else be joining your video visit? No          Assessment & Plan     Type 2 diabetes mellitus without complication, without long-term current use of insulin (H)  Diet controlled.     Primary hypertension  Controlled, continue treatment   - losartan (COZAAR) 25 MG tablet; Take 1 tablet (25 mg) by mouth daily    Primary osteoarthritis of both knees  Use PRN Tylenol  - acetaminophen (TYLENOL) 325 MG tablet; Take 2 tablets (650 mg) by mouth every 6 hours as needed for mild pain    Chronic pain of both shoulders  OA related.   - acetaminophen (TYLENOL) 325 MG tablet; Take 2 tablets (650 mg) by mouth every 6 hours as needed for mild pain    Recurrent major depressive disorder, in partial remission (H)  Continue selective serotonin reuptake inhibitor treatment , controlled   - sertraline (ZOLOFT) 50 MG tablet; Take 1 tablet (50 mg) by mouth daily             See Patient Instructions    MD DUDLEY Nuñez [facility]    Subjective   Man is a 77 year old, presenting for the following health issues:  Recheck Medication      9/15/2023    11:07 AM   Additional Questions   Roomed by Ebony BUCKNER   Accompanied by wife       HPI     Hypertension Follow-up    Do you check your blood pressure regularly outside of the clinic? Yes   Are you following a low salt diet? Yes  Are your blood pressures ever more than 140 on the top number (systolic) OR more   than 90 on the bottom number (diastolic), for example 140/90? Yes    Depression Followup  How are you doing with your depression since your last visit? No change  Are you having other symptoms that might be associated with depression? Yes:  agitation  Have you had a significant life event?  No   Are you feeling anxious or  having panic attacks?   Yes:     Do you have any concerns with your use of alcohol or other drugs? No    Social History     Tobacco Use    Smoking status: Former     Packs/day: 2.00     Years: 40.00     Pack years: 80.00     Types: Cigarettes     Start date: 2/3/1960     Quit date: 2001     Years since quittin.0    Smokeless tobacco: Never   Vaping Use    Vaping Use: Never used   Substance Use Topics    Alcohol use: No     Alcohol/week: 0.0 standard drinks of alcohol    Drug use: No         10/24/2018     2:27 PM 2021     2:03 PM 2022     1:32 PM   PHQ   PHQ-9 Total Score 13 10 19   Q9: Thoughts of better off dead/self-harm past 2 weeks Not at all Not at all Not at all         2016     9:36 AM   ROQUE-7 SCORE   Total Score 15         2022     1:32 PM   Last PHQ-9   1.  Little interest or pleasure in doing things 3   2.  Feeling down, depressed, or hopeless 1   3.  Trouble falling or staying asleep, or sleeping too much 3   4.  Feeling tired or having little energy 3   5.  Poor appetite or overeating 3   6.  Feeling bad about yourself 3   7.  Trouble concentrating 0   8.  Moving slowly or restless 3   Q9: Thoughts of better off dead/self-harm past 2 weeks 0   PHQ-9 Total Score 19         2016     9:36 AM   ROQUE-7    1. Feeling nervous, anxious, or on edge 3   2. Not being able to stop or control worrying 2   3. Worrying too much about different things 2   4. Trouble relaxing 3   5. Being so restless that it is hard to sit still 1   6. Becoming easily annoyed or irritable 2   7. Feeling afraid, as if something awful might happen 2   ROQUE-7 Total Score 15   If you checked any problems, how difficult have they made it for you to do your work, take care of things at home, or get along with other people? Somewhat difficult       Review of Systems   Constitutional, HEENT, cardiovascular, pulmonary, gi and gu systems are negative, except as otherwise noted.      Objective           Vitals:  No  vitals were obtained today due to virtual visit.    Physical Exam   Unable to do exam, phone call visit     Erroneous Encounter on 09/13/2023   Component Date Value Ref Range Status    Cholesterol 09/13/2023 159  <200 mg/dL Final    Triglycerides 09/13/2023 55  <150 mg/dL Final    Direct Measure HDL 09/13/2023 53  >=40 mg/dL Final    LDL Cholesterol Calculated 09/13/2023 95  <=100 mg/dL Final    Non HDL Cholesterol 09/13/2023 106  <130 mg/dL Final    WBC Count 09/13/2023 5.9  4.0 - 11.0 10e3/uL Final    RBC Count 09/13/2023 5.87  4.40 - 5.90 10e6/uL Final    Hemoglobin 09/13/2023 14.4  13.3 - 17.7 g/dL Final    Hematocrit 09/13/2023 44.9  40.0 - 53.0 % Final    MCV 09/13/2023 77 (L)  78 - 100 fL Final    MCH 09/13/2023 24.5 (L)  26.5 - 33.0 pg Final    MCHC 09/13/2023 32.1  31.5 - 36.5 g/dL Final    RDW 09/13/2023 14.6  10.0 - 15.0 % Final    Platelet Count 09/13/2023 209  150 - 450 10e3/uL Final    Sodium 09/13/2023 136  136 - 145 mmol/L Final    Potassium 09/13/2023 4.5  3.4 - 5.3 mmol/L Final    Chloride 09/13/2023 100  98 - 107 mmol/L Final    Carbon Dioxide (CO2) 09/13/2023 27  22 - 29 mmol/L Final    Anion Gap 09/13/2023 9  7 - 15 mmol/L Final    Urea Nitrogen 09/13/2023 21.7  8.0 - 23.0 mg/dL Final    Creatinine 09/13/2023 1.10  0.67 - 1.17 mg/dL Final    Calcium 09/13/2023 9.4  8.8 - 10.2 mg/dL Final    Glucose 09/13/2023 85  70 - 99 mg/dL Final    Alkaline Phosphatase 09/13/2023 68  40 - 129 U/L Final    AST 09/13/2023 28  0 - 45 U/L Final    Reference intervals for this test were updated on 6/12/2023 to more accurately reflect our healthy population. There may be differences in the flagging of prior results with similar values performed with this method. Interpretation of those prior results can be made in the context of the updated reference intervals.    ALT 09/13/2023 16  0 - 70 U/L Final    Reference intervals for this test were updated on 6/12/2023 to more accurately reflect our healthy population.  There may be differences in the flagging of prior results with similar values performed with this method. Interpretation of those prior results can be made in the context of the updated reference intervals.      Protein Total 09/13/2023 7.1  6.4 - 8.3 g/dL Final    Albumin 09/13/2023 4.2  3.5 - 5.2 g/dL Final    Bilirubin Total 09/13/2023 0.6  <=1.2 mg/dL Final    GFR Estimate 09/13/2023 69  >60 mL/min/1.73m2 Final    Prostate Specific Antigen Screen 09/13/2023 2.37  0.00 - 6.50 ng/mL Final    TSH 09/13/2023 1.50  0.30 - 4.20 uIU/mL Final    Hemoglobin A1C 09/13/2023 5.9 (H)  0.0 - 5.6 % Final    Normal <5.7%   Prediabetes 5.7-6.4%    Diabetes 6.5% or higher     Note: Adopted from ADA consensus guidelines.    Color Urine 09/13/2023 Yellow  Colorless, Straw, Light Yellow, Yellow Final    Appearance Urine 09/13/2023 Clear  Clear Final    Glucose Urine 09/13/2023 Negative  Negative mg/dL Final    Bilirubin Urine 09/13/2023 Negative  Negative Final    Ketones Urine 09/13/2023 Negative  Negative mg/dL Final    Specific Gravity Urine 09/13/2023 1.010  1.003 - 1.035 Final    Blood Urine 09/13/2023 Negative  Negative Final    pH Urine 09/13/2023 6.0  5.0 - 7.0 Final    Protein Albumin Urine 09/13/2023 Negative  Negative mg/dL Final    Urobilinogen Urine 09/13/2023 0.2  0.2, 1.0 E.U./dL Final    Nitrite Urine 09/13/2023 Negative  Negative Final    Leukocyte Esterase Urine 09/13/2023 Trace (A)  Negative Final    Creatinine Urine mg/dL 09/13/2023 24.6  mg/dL Final    The reference ranges have not been established in urine creatinine. The results should be integrated into the clinical context for interpretation.    Albumin Urine mg/L 09/13/2023 18.7  mg/L Final    The reference ranges have not been established in urine albumin. The results should be integrated into the clinical context for interpretation.    Albumin Urine mg/g Cr 09/13/2023 76.02 (H)  0.00 - 17.00 mg/g Cr Final    Microalbuminuria is defined as an  albumin:creatinine ratio of 17 to 299 for males and 25 to 299 for females. A ratio of albumin:creatinine of 300 or higher is indicative of overt proteinuria.  Due to biologic variability, positive results should be confirmed by a second, first-morning random or 24-hour timed urine specimen. If there is discrepancy, a third specimen is recommended. When 2 out of 3 results are in the microalbuminuria range, this is evidence for incipient nephropathy and warrants increased efforts at glucose control, blood pressure control, and institution of therapy with an angiotensin-converting-enzyme (ACE) inhibitor (if the patient can tolerate it).      Bacteria Urine 09/13/2023 Few (A)  None Seen /HPF Final    RBC Urine 09/13/2023 0-2  0-2 /HPF /HPF Final    WBC Urine 09/13/2023 0-5  0-5 /HPF /HPF Final               Video-Visit Details    Type of service:  phone call   Call time 14 min

## 2023-09-20 ENCOUNTER — TELEPHONE (OUTPATIENT)
Dept: INTERNAL MEDICINE | Facility: CLINIC | Age: 78
End: 2023-09-20
Payer: COMMERCIAL

## 2023-09-20 NOTE — TELEPHONE ENCOUNTER
Fax received from [employer] 09/20/13 for review and signature.  Put in Dr. Sun's in basket.

## 2023-10-09 ENCOUNTER — PATIENT OUTREACH (OUTPATIENT)
Dept: GERIATRIC MEDICINE | Facility: CLINIC | Age: 78
End: 2023-10-09
Payer: COMMERCIAL

## 2023-10-09 NOTE — PROGRESS NOTES
[facility] Care Coordination Contact      [facility] Six-Month Telephone Assessment    6 month telephone assessment completed on 10/6/23 with his wife and Sruthi Khan,  from [facility].    ER visits: No  Hospitalizations: No  TCU stays: No  Significant health status changes: No  Falls/Injuries: Yes: poor balance, no injuries  ADL/IADL changes: No  Changes in services: No    Caregiver Assessment follow up:  NA    Goals: See POC in chart for goal progress documentation.    Man received the new shower chair and this has been very helpful.   Discussed MA renewal forms to be completed once they receive to continue services.     Will see member in 6 months for an annual health risk assessment.   Encouraged member to call CC with any questions or concerns in the meantime.     Layla Brewer RN,  PHN  [facility] Care Coordinator  [phone]

## 2023-12-22 DIAGNOSIS — G89.29 CHRONIC PAIN OF BOTH SHOULDERS: ICD-10-CM

## 2023-12-22 DIAGNOSIS — M17.0 PRIMARY OSTEOARTHRITIS OF BOTH KNEES: ICD-10-CM

## 2023-12-22 DIAGNOSIS — M25.512 CHRONIC PAIN OF BOTH SHOULDERS: ICD-10-CM

## 2023-12-22 DIAGNOSIS — M25.511 CHRONIC PAIN OF BOTH SHOULDERS: ICD-10-CM

## 2023-12-22 RX ORDER — VITAMIN B COMPLEX
1 TABLET ORAL DAILY
Qty: 90 TABLET | Refills: 0 | Status: SHIPPED | OUTPATIENT
Start: 2023-12-22

## 2024-02-26 ENCOUNTER — VIRTUAL VISIT (OUTPATIENT)
Dept: INTERNAL MEDICINE | Facility: CLINIC | Age: 79
End: 2024-02-26
Payer: COMMERCIAL

## 2024-02-26 DIAGNOSIS — G47.33 OSA (OBSTRUCTIVE SLEEP APNEA): Primary | ICD-10-CM

## 2024-02-26 PROCEDURE — 99442 PR PHYSICIAN TELEPHONE EVALUATION 11-20 MIN: CPT | Mod: 95 | Performed by: INTERNAL MEDICINE

## 2024-02-26 NOTE — PROGRESS NOTES
Man is a 78 year old who is being evaluated via a billable video visit.      How would you like to obtain your AVS? MyChart  If the video visit is dropped, the invitation should be resent by: Text to cell phone: [phone]  Will anyone else be joining your video visit? Yes: grandson. How would they like to receive their invitation? Text to cell phone:[phone]          Assessment & Plan     KULWANT (obstructive sleep apnea)  Placed order for CPAP supplies  Continue regular use of CPAP   - CPAP Order for DME - ONLY FOR DME              Clinic visit advised for annual check     Subjective   Man is a 78 year old, presenting for the following health issues:  CPAP Follow Up      2/26/2024     5:10 PM   Additional Questions   Roomed by azamit   Accompanied by wife,         2/26/2024     5:10 PM   Patient Reported Additional Medications   Patient reports taking the following new medications none     HPI     Has h/o KULWANT. Uses CPAP with pressure 8  Has good compliance and uses regularly.   Helps with symptoms of snoring and daytime fatigue.           Review of Systems  CONSTITUTIONAL: NEGATIVE for fever, chills, change in weight  ENT/MOUTH: NEGATIVE for ear, mouth and throat problems  RESP: NEGATIVE for significant cough or SOB  CV: NEGATIVE for chest pain, palpitations or peripheral edema      Objective           Vitals:  No vitals were obtained today due to virtual visit.    Physical Exam   Unable to do , changed to phone visit     Erroneous Encounter on 09/13/2023   Component Date Value Ref Range Status    Cholesterol 09/13/2023 159  <200 mg/dL Final    Triglycerides 09/13/2023 55  <150 mg/dL Final    Direct Measure HDL 09/13/2023 53  >=40 mg/dL Final    LDL Cholesterol Calculated 09/13/2023 95  <=100 mg/dL Final    Non HDL Cholesterol 09/13/2023 106  <130 mg/dL Final    WBC Count 09/13/2023 5.9  4.0 - 11.0 10e3/uL Final    RBC Count 09/13/2023 5.87  4.40 - 5.90 10e6/uL Final    Hemoglobin 09/13/2023 14.4  13.3 - 17.7  g/dL Final    Hematocrit 09/13/2023 44.9  40.0 - 53.0 % Final    MCV 09/13/2023 77 (L)  78 - 100 fL Final    MCH 09/13/2023 24.5 (L)  26.5 - 33.0 pg Final    MCHC 09/13/2023 32.1  31.5 - 36.5 g/dL Final    RDW 09/13/2023 14.6  10.0 - 15.0 % Final    Platelet Count 09/13/2023 209  150 - 450 10e3/uL Final    Sodium 09/13/2023 136  136 - 145 mmol/L Final    Potassium 09/13/2023 4.5  3.4 - 5.3 mmol/L Final    Chloride 09/13/2023 100  98 - 107 mmol/L Final    Carbon Dioxide (CO2) 09/13/2023 27  22 - 29 mmol/L Final    Anion Gap 09/13/2023 9  7 - 15 mmol/L Final    Urea Nitrogen 09/13/2023 21.7  8.0 - 23.0 mg/dL Final    Creatinine 09/13/2023 1.10  0.67 - 1.17 mg/dL Final    Calcium 09/13/2023 9.4  8.8 - 10.2 mg/dL Final    Glucose 09/13/2023 85  70 - 99 mg/dL Final    Alkaline Phosphatase 09/13/2023 68  40 - 129 U/L Final    AST 09/13/2023 28  0 - 45 U/L Final    Reference intervals for this test were updated on 6/12/2023 to more accurately reflect our healthy population. There may be differences in the flagging of prior results with similar values performed with this method. Interpretation of those prior results can be made in the context of the updated reference intervals.    ALT 09/13/2023 16  0 - 70 U/L Final    Reference intervals for this test were updated on 6/12/2023 to more accurately reflect our healthy population. There may be differences in the flagging of prior results with similar values performed with this method. Interpretation of those prior results can be made in the context of the updated reference intervals.      Protein Total 09/13/2023 7.1  6.4 - 8.3 g/dL Final    Albumin 09/13/2023 4.2  3.5 - 5.2 g/dL Final    Bilirubin Total 09/13/2023 0.6  <=1.2 mg/dL Final    GFR Estimate 09/13/2023 69  >60 mL/min/1.73m2 Final    Prostate Specific Antigen Screen 09/13/2023 2.37  0.00 - 6.50 ng/mL Final    TSH 09/13/2023 1.50  0.30 - 4.20 uIU/mL Final    Hemoglobin A1C 09/13/2023 5.9 (H)  0.0 - 5.6 % Final     Normal <5.7%   Prediabetes 5.7-6.4%    Diabetes 6.5% or higher     Note: Adopted from ADA consensus guidelines.    Color Urine 09/13/2023 Yellow  Colorless, Straw, Light Yellow, Yellow Final    Appearance Urine 09/13/2023 Clear  Clear Final    Glucose Urine 09/13/2023 Negative  Negative mg/dL Final    Bilirubin Urine 09/13/2023 Negative  Negative Final    Ketones Urine 09/13/2023 Negative  Negative mg/dL Final    Specific Gravity Urine 09/13/2023 1.010  1.003 - 1.035 Final    Blood Urine 09/13/2023 Negative  Negative Final    pH Urine 09/13/2023 6.0  5.0 - 7.0 Final    Protein Albumin Urine 09/13/2023 Negative  Negative mg/dL Final    Urobilinogen Urine 09/13/2023 0.2  0.2, 1.0 E.U./dL Final    Nitrite Urine 09/13/2023 Negative  Negative Final    Leukocyte Esterase Urine 09/13/2023 Trace (A)  Negative Final    Creatinine Urine mg/dL 09/13/2023 24.6  mg/dL Final    The reference ranges have not been established in urine creatinine. The results should be integrated into the clinical context for interpretation.    Albumin Urine mg/L 09/13/2023 18.7  mg/L Final    The reference ranges have not been established in urine albumin. The results should be integrated into the clinical context for interpretation.    Albumin Urine mg/g Cr 09/13/2023 76.02 (H)  0.00 - 17.00 mg/g Cr Final    Microalbuminuria is defined as an albumin:creatinine ratio of 17 to 299 for males and 25 to 299 for females. A ratio of albumin:creatinine of 300 or higher is indicative of overt proteinuria.  Due to biologic variability, positive results should be confirmed by a second, first-morning random or 24-hour timed urine specimen. If there is discrepancy, a third specimen is recommended. When 2 out of 3 results are in the microalbuminuria range, this is evidence for incipient nephropathy and warrants increased efforts at glucose control, blood pressure control, and institution of therapy with an angiotensin-converting-enzyme (ACE) inhibitor (if the  patient can tolerate it).      Bacteria Urine 09/13/2023 Few (A)  None Seen /HPF Final    RBC Urine 09/13/2023 0-2  0-2 /HPF /HPF Final    WBC Urine 09/13/2023 0-5  0-5 /HPF /HPF Final         Video-Visit Details    Type of service:  phone visit      Phone time 12 min      Signed Electronically by: Ray Sun MD

## 2024-02-29 ENCOUNTER — PATIENT OUTREACH (OUTPATIENT)
Dept: GERIATRIC MEDICINE | Facility: CLINIC | Age: 79
End: 2024-02-29
Payer: COMMERCIAL

## 2024-02-29 NOTE — PROGRESS NOTES
[facility] Care Coordination Contact    Called  Sruthi  to schedule annual HRA home visit. HRA has been scheduled for Tuesday 3/5 at 1:00.  Notified [facility]  Services of the appointment time.     Layla Brewer RN,  PHN  [facility] Care Coordinator  [phone]

## 2024-03-05 ENCOUNTER — PATIENT OUTREACH (OUTPATIENT)
Dept: GERIATRIC MEDICINE | Facility: CLINIC | Age: 79
End: 2024-03-05
Payer: COMMERCIAL

## 2024-03-05 SDOH — HEALTH STABILITY: PHYSICAL HEALTH: ON AVERAGE, HOW MANY MINUTES DO YOU ENGAGE IN EXERCISE AT THIS LEVEL?: 0 MIN

## 2024-03-05 SDOH — HEALTH STABILITY: PHYSICAL HEALTH: ON AVERAGE, HOW MANY DAYS PER WEEK DO YOU ENGAGE IN MODERATE TO STRENUOUS EXERCISE (LIKE A BRISK WALK)?: 0 DAYS

## 2024-03-05 ASSESSMENT — SOCIAL DETERMINANTS OF HEALTH (SDOH)
IN A TYPICAL WEEK, HOW MANY TIMES DO YOU TALK ON THE PHONE WITH FAMILY, FRIENDS, OR NEIGHBORS?: MORE THAN THREE TIMES A WEEK
HOW OFTEN DO YOU ATTENT MEETINGS OF THE CLUB OR ORGANIZATION YOU BELONG TO?: MORE THAN 4 TIMES PER YEAR
HOW OFTEN DO YOU GET TOGETHER WITH FRIENDS OR RELATIVES?: MORE THAN THREE TIMES A WEEK
DO YOU BELONG TO ANY CLUBS OR ORGANIZATIONS SUCH AS CHURCH GROUPS UNIONS, FRATERNAL OR ATHLETIC GROUPS, OR SCHOOL GROUPS?: YES
HOW OFTEN DO YOU ATTEND CHURCH OR RELIGIOUS SERVICES?: NEVER

## 2024-03-05 ASSESSMENT — LIFESTYLE VARIABLES
HOW MANY STANDARD DRINKS CONTAINING ALCOHOL DO YOU HAVE ON A TYPICAL DAY: PATIENT DOES NOT DRINK
HOW OFTEN DO YOU HAVE A DRINK CONTAINING ALCOHOL: NEVER
HOW OFTEN DO YOU HAVE SIX OR MORE DRINKS ON ONE OCCASION: NEVER
SKIP TO QUESTIONS 9-10: 1
AUDIT-C TOTAL SCORE: 0

## 2024-03-05 ASSESSMENT — ACTIVITIES OF DAILY LIVING (ADL)
DEPENDENT_IADLS:: CLEANING;COOKING;LAUNDRY;SHOPPING;MEAL PREPARATION;MEDICATION MANAGEMENT;MONEY MANAGEMENT;TRANSPORTATION;INCONTINENCE

## 2024-03-05 NOTE — Clinical Note
Hi Dr Sun,  I was able to complete the annual assessment in the home. No change in the POC.  He will have PCA services and go to the adult day care.  Thanks,  Layla

## 2024-03-05 NOTE — PROGRESS NOTES
[facility] Care Coordination Contact    [facility] Home Visit Assessment     Home visit for Health Risk Assessment with Josh Gonzalez completed on March 5, 2024    Type of residence:: Private home - stairs  Current living arrangement:: I live in a private home with family     Assessment completed with:: Patient, Spouse or significant other, Other    Current Care Plan  Member currently receiving the following home care services:     Member currently receiving the following community resources: PCA, County Programs, Day Care      Medication Review  Medication reconciliation completed in Epic: Yes  Medication set-up & administration: Independent-does not set up and Family/informal caregiver sets up daily.  Family caregiver administers medications.  Medication Risk Assessment Medication (1 or more, place referral to MTM): N/A: No risk factors identified  MTM Referral Placed: No: No risk factors idenified    Mental/Behavioral Health   Depression Screening:   PHQ-2 Total Score (Adult) - Positive if 3 or more points; Administer PHQ-9 if positive: 2       Mental health DX:: No   Mental health DX how managed:: None    Falls Assessment:   Fallen 2 or more times in the past year?: No   Any fall with injury in the past year?: No    ADL/IADL Dependencies:   Dependent ADLs:: Ambulation-cane, Bathing, Dressing, Grooming, Incontinence, Transfers  Dependent IADLs:: Cleaning, Cooking, Laundry, Shopping, Meal Preparation, Medication Management, Money Management, Transportation, Incontinence    Health Plan sponsored benefits: [employer]: Shared information regarding [employer] Fitness Program. Reviewed preventative health screening and health plan supplemental benefits/incentives. Reviewed medication disposal form.    PCA Assessment completed at visit: Yes Annual PCA assessment indicated 3.5 hours per day of PCA. This is the same as the previous assessment.     Elderly Waiver Eligibility: Yes-will continue on EW    Care Plan  & Recommendations: Man would like to continue the PCA services and the adult day care. No other concerns at this time.    See [facility] for detailed assessment information.    Follow-Up Plan: Member informed of future contact, plan to f/u with member with a 6 month telephone assessment.  Contact information shared with member and family, encouraged member to call with any questions or concerns at any time.    [place] care continuum providers: Please see Snapshot and Care Management Flowsheets for Specific details of care plan.    This CC note routed to PCP, Ray Sun RN,  PHN  [facility] Care Coordinator  [phone]

## 2024-03-08 ENCOUNTER — PATIENT OUTREACH (OUTPATIENT)
Dept: GERIATRIC MEDICINE | Facility: CLINIC | Age: 79
End: 2024-03-08
Payer: COMMERCIAL

## 2024-03-08 NOTE — LETTER
March 8, 2024    Important [employer] Information    [facility]  [address] [id number]  Your Care Plan  Dear Josh,  When we spoke recently, I promised to send you a Care Plan. The plan enclosed is a summary of our discussion. It includes the steps we agreed would help you meet your health goals. In addition, I can help you with:  [id number]  This program is available to members who need a ride to medical and dental visits. To schedule a ride, call [phone] or [phone] (toll free). TTY: 711. You can call Monday - Thursday 8 a.m. to 5 p.m. and Fridays 9 a.m. to 5 p.m.  One Pass  One Pass is your no-cost, complete, fitness solution for your mind and body. To learn more visit [employer]fitness or call One Pass, toll-free 1 [phone] (TTY: 711) 8 a.m. to 9 p.m. Monday-Friday.  Health Care Directive   This form helps you outline your health care wishes. You can request a form from me and I will answer any questions you have before you discuss it with your doctor.   Annual Physical  Take a key step on your path to good health and set up an annual physical at your clinic.  Questions?  Call me at [phone] Monday-Friday between 8am and 5pm.  TTY: 711. As we discussed, I plan to be in touch with you again in 6 months to follow up via phone.  Sincerely,    Layla Olmstead RN, PHN  [phone]  [email or web address]    cc: member records

## 2024-03-08 NOTE — PROGRESS NOTES
[facility] Care Coordination Contact    Received after visit chart from care coordinator.  Completed following tasks: Mailed copy of care plan/support plan to member, Mailed Safe Medication Disposal , Mailed [employer] Leave Behind Letter, Submitted referrals/auths for ADC, and Updated services in Database  , Provider Signature - No POC Shared:  Member indicates that they do not want their POC shared with any EW providers.    , and [employer]:  Faxed completed PCA assessment to [facility] Agency and mailed copies to member.  Faxed MD Communication to PCP.  Emailed referral form for auth to [employer].    Elenita Lowery  Case Management Specialist  [facility]  [phone]

## 2024-03-15 ENCOUNTER — TELEPHONE (OUTPATIENT)
Dept: INTERNAL MEDICINE | Facility: CLINIC | Age: 79
End: 2024-03-15

## 2024-03-15 NOTE — TELEPHONE ENCOUNTER
Fax received from [employer] 02/26/24 for review and signature.  Put in Dr. Sun's in basket.

## 2024-03-20 NOTE — TELEPHONE ENCOUNTER
Faxed response back to Steven from Dr. Sun.  Patient will need to make an appointment and be seen in order for forms to be completed.  Forms are under the wire basket on my desk.

## 2024-03-28 NOTE — PROGRESS NOTES
[facility] Care Coordination Contact      [facility] Six-Month Telephone Assessment    6 month telephone assessment completed on 1/20/20 with daughter Yogi.    ER visits: No  Hospitalizations: No  TCU stays: No  Significant health status changes: no changes per daughter.  Had cough but now better.  Waiting to get new dentures so only can eat soft foods.  Falls/Injuries: No  ADL/IADL changes: No  Changes in services: No    Caregiver Assessment follow up:  NA    Goals: See POC in chart for goal progress documentation.      Will see member in 6 months for an annual health risk assessment.   Encouraged member to call CC with any questions or concerns in the meantime.   VIRGINIA Durbin, CCM  [facility] Care Coordinator  Tel [phone]  Fax [phone]             [address]

## 2024-04-25 ENCOUNTER — ANCILLARY PROCEDURE (OUTPATIENT)
Dept: GENERAL RADIOLOGY | Facility: CLINIC | Age: 79
End: 2024-04-25
Attending: INTERNAL MEDICINE
Payer: COMMERCIAL

## 2024-04-25 ENCOUNTER — OFFICE VISIT (OUTPATIENT)
Dept: INTERNAL MEDICINE | Facility: CLINIC | Age: 79
End: 2024-04-25
Payer: COMMERCIAL

## 2024-04-25 VITALS
RESPIRATION RATE: 12 BRPM | OXYGEN SATURATION: 98 % | HEIGHT: 64 IN | SYSTOLIC BLOOD PRESSURE: 130 MMHG | WEIGHT: 125.8 LBS | BODY MASS INDEX: 21.48 KG/M2 | TEMPERATURE: 97.1 F | HEART RATE: 65 BPM | DIASTOLIC BLOOD PRESSURE: 70 MMHG

## 2024-04-25 DIAGNOSIS — Z29.11 NEED FOR VACCINATION AGAINST RESPIRATORY SYNCYTIAL VIRUS: ICD-10-CM

## 2024-04-25 DIAGNOSIS — Z00.00 ENCOUNTER FOR PREVENTATIVE ADULT HEALTH CARE EXAMINATION: Primary | ICD-10-CM

## 2024-04-25 DIAGNOSIS — H90.3 ASYMMETRICAL SENSORINEURAL HEARING LOSS: ICD-10-CM

## 2024-04-25 DIAGNOSIS — I10 PRIMARY HYPERTENSION: ICD-10-CM

## 2024-04-25 DIAGNOSIS — Z23 NEED FOR TDAP VACCINATION: ICD-10-CM

## 2024-04-25 DIAGNOSIS — Z11.59 NEED FOR HEPATITIS C SCREENING TEST: ICD-10-CM

## 2024-04-25 DIAGNOSIS — M54.50 BILATERAL LOW BACK PAIN WITHOUT SCIATICA, UNSPECIFIED CHRONICITY: ICD-10-CM

## 2024-04-25 DIAGNOSIS — E11.9 TYPE 2 DIABETES MELLITUS WITHOUT COMPLICATION, WITHOUT LONG-TERM CURRENT USE OF INSULIN (H): ICD-10-CM

## 2024-04-25 DIAGNOSIS — R53.83 OTHER FATIGUE: ICD-10-CM

## 2024-04-25 DIAGNOSIS — Z23 NEED FOR SHINGLES VACCINE: ICD-10-CM

## 2024-04-25 DIAGNOSIS — F33.41 RECURRENT MAJOR DEPRESSIVE DISORDER, IN PARTIAL REMISSION (H): ICD-10-CM

## 2024-04-25 LAB
ALBUMIN UR-MCNC: NEGATIVE MG/DL
APPEARANCE UR: CLEAR
BACTERIA #/AREA URNS HPF: ABNORMAL /HPF
BILIRUB UR QL STRIP: NEGATIVE
COLOR UR AUTO: YELLOW
ERYTHROCYTE [DISTWIDTH] IN BLOOD BY AUTOMATED COUNT: 15 % (ref 10–15)
GLUCOSE UR STRIP-MCNC: NEGATIVE MG/DL
HBA1C MFR BLD: 5.9 % (ref 0–5.6)
HCT VFR BLD AUTO: 43.6 % (ref 40–53)
HGB BLD-MCNC: 13.7 G/DL (ref 13.3–17.7)
HGB UR QL STRIP: ABNORMAL
KETONES UR STRIP-MCNC: NEGATIVE MG/DL
LEUKOCYTE ESTERASE UR QL STRIP: NEGATIVE
MCH RBC QN AUTO: 24.4 PG (ref 26.5–33)
MCHC RBC AUTO-ENTMCNC: 31.4 G/DL (ref 31.5–36.5)
MCV RBC AUTO: 78 FL (ref 78–100)
NITRATE UR QL: NEGATIVE
PH UR STRIP: 6 [PH] (ref 5–7)
PLATELET # BLD AUTO: 233 10E3/UL (ref 150–450)
RBC # BLD AUTO: 5.62 10E6/UL (ref 4.4–5.9)
RBC #/AREA URNS AUTO: ABNORMAL /HPF
SP GR UR STRIP: 1.01 (ref 1–1.03)
SQUAMOUS #/AREA URNS AUTO: ABNORMAL /LPF
UROBILINOGEN UR STRIP-ACNC: 0.2 E.U./DL
WBC # BLD AUTO: 7.5 10E3/UL (ref 4–11)
WBC #/AREA URNS AUTO: ABNORMAL /HPF

## 2024-04-25 PROCEDURE — 99214 OFFICE O/P EST MOD 30 MIN: CPT | Mod: 25 | Performed by: INTERNAL MEDICINE

## 2024-04-25 PROCEDURE — 86803 HEPATITIS C AB TEST: CPT | Performed by: INTERNAL MEDICINE

## 2024-04-25 PROCEDURE — 85027 COMPLETE CBC AUTOMATED: CPT | Performed by: INTERNAL MEDICINE

## 2024-04-25 PROCEDURE — 84443 ASSAY THYROID STIM HORMONE: CPT | Performed by: INTERNAL MEDICINE

## 2024-04-25 PROCEDURE — 72100 X-RAY EXAM L-S SPINE 2/3 VWS: CPT | Mod: TC | Performed by: RADIOLOGY

## 2024-04-25 PROCEDURE — G0438 PPPS, INITIAL VISIT: HCPCS | Performed by: INTERNAL MEDICINE

## 2024-04-25 PROCEDURE — 80053 COMPREHEN METABOLIC PANEL: CPT | Performed by: INTERNAL MEDICINE

## 2024-04-25 PROCEDURE — 83036 HEMOGLOBIN GLYCOSYLATED A1C: CPT | Performed by: INTERNAL MEDICINE

## 2024-04-25 PROCEDURE — 36415 COLL VENOUS BLD VENIPUNCTURE: CPT | Performed by: INTERNAL MEDICINE

## 2024-04-25 PROCEDURE — 81001 URINALYSIS AUTO W/SCOPE: CPT | Performed by: INTERNAL MEDICINE

## 2024-04-25 RX ORDER — LOSARTAN POTASSIUM 25 MG/1
25 TABLET ORAL DAILY
Qty: 90 TABLET | Refills: 3 | Status: SHIPPED | OUTPATIENT
Start: 2024-04-25

## 2024-04-25 RX ORDER — RESPIRATORY SYNCYTIAL VIRUS VACCINE 120MCG/0.5
0.5 KIT INTRAMUSCULAR ONCE
Qty: 1 EACH | Refills: 0 | Status: CANCELLED | OUTPATIENT
Start: 2024-04-25 | End: 2024-04-25

## 2024-04-25 ASSESSMENT — PATIENT HEALTH QUESTIONNAIRE - PHQ9
SUM OF ALL RESPONSES TO PHQ QUESTIONS 1-9: 14
SUM OF ALL RESPONSES TO PHQ QUESTIONS 1-9: 14
10. IF YOU CHECKED OFF ANY PROBLEMS, HOW DIFFICULT HAVE THESE PROBLEMS MADE IT FOR YOU TO DO YOUR WORK, TAKE CARE OF THINGS AT HOME, OR GET ALONG WITH OTHER PEOPLE: SOMEWHAT DIFFICULT

## 2024-04-25 ASSESSMENT — ACTIVITIES OF DAILY LIVING (ADL): CURRENT_FUNCTION: NEEDS ASSISTANCE

## 2024-04-25 NOTE — PROGRESS NOTES
Assessment & Plan       Type 2 diabetes mellitus without complication, without long-term current use of insulin (H)  Assess lab, keep diet   - HEMOGLOBIN A1C  - OFFICE/OUTPT VISIT,EST,LEVL III    Need for hepatitis C screening test    - Hepatitis C Screen Reflex to HCV RNA Quant and Genotype    Primary hypertension  Start Losartan for HTN and renal protection   - losartan (COZAAR) 25 MG tablet; Take 1 tablet (25 mg) by mouth daily  - CBC with platelets  - Comprehensive metabolic panel (BMP + Alb, Alk Phos, ALT, AST, Total. Bili, TP)  - TSH with free T4 reflex  - UA with Microscopic reflex to Culture - lab collect  - OFFICE/OUTPT VISIT,EST,LEVL III    Encounter for preventative adult health care examination  advised regular aerobic activity, low cholesterol, low salt diet, wearing seat belt,  self examinations, sunscreen protection.Obtain screening cholesterol, immunizations reviewed.    - CBC with platelets  - Comprehensive metabolic panel (BMP + Alb, Alk Phos, ALT, AST, Total. Bili, TP)  - TSH with free T4 reflex  - UA with Microscopic reflex to Culture - lab collect    Other fatigue  Assess for anemia, thyroid function abnormality   - CBC with platelets  - Comprehensive metabolic panel (BMP + Alb, Alk Phos, ALT, AST, Total. Bili, TP)  - TSH with free T4 reflex    Recurrent major depressive disorder, in partial remission (H24)  Continue SSRI  - sertraline (ZOLOFT) 50 MG tablet; Take 1 tablet (50 mg) by mouth daily  - OFFICE/OUTPT VISIT,EST,LEVL III    Bilateral low back pain without sciatica, unspecified chronicity  Assess X rays, refer to PT  - XR Lumbar Spine 2/3 Views; Future  - Physical Therapy  Referral; Future  - OFFICE/OUTPT VISIT,EST,LEVL III    Asymmetrical sensorineural hearing loss  Refer for hearing test.   - Adult Audiology  Referral; Future  - OFFICE/OUTPT VISIT,EST,LEVL III              See Patient Instructions    Subjective   Man is a 78 year old, presenting for the following  health issues:  RECHECK and Urinary Problem (Pt c/o frequent urination for a while)        4/25/2024     2:53 PM   Additional Questions   Roomed by Ebony BUCKNER   Accompanied by n/a     Via the Health Maintenance questionnaire, the patient has reported the following services have been completed -Eye Exam, this information has been sent to the abstraction team.  History of Present Illness       Reason for visit:  Checkup    He eats 0-1 servings of fruits and vegetables daily.He consumes 0 sweetened beverage(s) daily.He exercises with enough effort to increase his heart rate 9 or less minutes per day.  He exercises with enough effort to increase his heart rate 3 or less days per week.   He is taking medications regularly.         Annual Wellness Visit     Patient has been advised of split billing requirements and indicates understanding: Yes         In general, how would you rate your overall physical health? (!) FAIR   Discussed with patient their rating of physical health; information has been provided.   Do you have a special diet?  Diabetic        3/5/2024   Exercise, Social Connection, Stress   Days per week of moderate/strenous exercise 0 days   Average minutes spent exercising at this level 0 min   Frequency of gathering with friends or relatives More than 3   Feel stress (tense, anxious, or unable to sleep) Not at all     Do you see a dentist two times every year?  (!) NO  The patient was instructed to see the dentist every 6 months.   Have you been more tired than usual lately?  (!) YES   Discussed possible causes of fatigue.   If you drink alcohol do you typically have >3 drinks per day or >7 drinks per week? No  Do you have a current opioid prescription? No  Do you use any other controlled substances or medications that are not prescribed by a provider? None  Social History     Tobacco Use    Smoking status: Former     Current packs/day: 0.00     Average packs/day: 2.0 packs/day for 41.6 years (83.2 ttl pk-yrs)      Types: Cigarettes     Start date: 2/3/1960     Quit date: 2001     Years since quittin.6    Smokeless tobacco: Never   Vaping Use    Vaping status: Never Used   Substance Use Topics    Alcohol use: No     Alcohol/week: 0.0 standard drinks of alcohol    Drug use: No       Needs assistance for the following daily activities: (!) TRANSPORTATION, (!) MONEY MANAGEMENT, (!) FEEDING, and (!) DRESSING  Which of the following safety concerns are present in your home?  none identified   Do you (or your family members) have any concerns about your safety while driving?  (!) YES   Addressed any concerns about safety while driving.    Do you have any of the following hearing concerns?: (!) I FEEL THAT PEOPLE ARE MUMBLING OR NOT SPEAKING CLEARLY  In the past 6 months, have you been bothered by leaking of urine? No           3/5/2024   Fall Risk   Fallen 2 or more times in the past year? No        Today's PHQ-9 Score:       2024     2:40 PM   PHQ-9 SCORE   PHQ-9 Total Score MyChart 14 (Moderate depression)   PHQ-9 Total Score 14     ASCVD Risk   The 10-year ASCVD risk score (Vincent HAYDEN, et al., 2019) is: 55.6%    Values used to calculate the score:      Age: 78 years      Sex: Male      Is Non- : No      Diabetic: Yes      Tobacco smoker: No      Systolic Blood Pressure: 130 mmHg      Is BP treated: Yes      HDL Cholesterol: 53 mg/dL      Total Cholesterol: 159 mg/dL      Concern for generalized weakness, poor energy, LBP.   Has H/O DM. On diet . Blood sugars are controlled. No parestesias. No hypoglycemias.  Has h/o HTN. Not on medical treatment. BP has been controlled. No CP, HA, dizziness.   Has h/o depression. On medical treatment, controlled, no side effects. Has chronic depressive symptoms , no suicidal ideation.            Reviewed and updated as needed this visit by Provider                    Lab work is in process  Labs reviewed in EPIC    Current providers sharing in  care for this patient include:  Patient Care Team:  Ray Sun MD as PCP - General (Internal Medicine)  Ray Sun MD as Assigned PCP  Layla Brewer RN as Lead Care Coordinator (Primary Care - CC)    The following health maintenance items are reviewed in Epic and correct as of today:  Health Maintenance   Topic Date Due    DIABETIC FOOT EXAM  Never done    DEPRESSION ACTION PLAN  Never done    EYE EXAM  Never done    HEPATITIS C SCREENING  Never done    ZOSTER IMMUNIZATION (1 of 2) Never done    RSV VACCINE (Pregnancy & 60+) (1 - 1-dose 60+ series) Never done    ADVANCE CARE PLANNING  12/13/2021    DTAP/TDAP/TD IMMUNIZATION (2 - Td or Tdap) 01/11/2022    COVID-19 Vaccine (7 - 2023-24 season) 03/11/2024    A1C  03/13/2024    BMP  09/13/2024    LIPID  09/13/2024    MICROALBUMIN  09/13/2024    ANNUAL REVIEW OF HM ORDERS  09/15/2024    PHQ-9  10/25/2024    FALL RISK ASSESSMENT  03/05/2025    MEDICARE ANNUAL WELLNESS VISIT  04/25/2025    INFLUENZA VACCINE  Completed    Pneumococcal Vaccine: 65+ Years  Completed    IPV IMMUNIZATION  Aged Out    HPV IMMUNIZATION  Aged Out    MENINGITIS IMMUNIZATION  Aged Out    RSV MONOCLONAL ANTIBODY  Aged Out    COLORECTAL CANCER SCREENING  Discontinued    LUNG CANCER SCREENING  Discontinued       Appropriate preventive services were discussed with this patient, including applicable screening as appropriate for fall prevention, nutrition, physical activity, Tobacco-use cessation, weight loss and cognition.  Checklist reviewing preventive services available has been given to the patient.           No data to display                           Review of Systems  Constitutional, HEENT, cardiovascular, pulmonary, GI, , musculoskeletal, neuro, skin, endocrine and psych systems are negative, except as otherwise noted.      Objective    BP (!) 142/72 (BP Location: Left arm, Cuff Size: Adult Regular)   Pulse 65   Temp 97.1  F (36.2  C) (Tympanic)   Resp 12   Ht 1.626 m  "(5' 4\")   Wt 57.1 kg (125 lb 12.8 oz)   SpO2 98%   BMI 21.59 kg/m    Body mass index is 21.59 kg/m .  Physical Exam   GENERAL: alert and no distress, weak, frail  EYES: Eyes grossly normal to inspection, PERRL and conjunctivae and sclerae normal  HENT: ear canals and TM's normal, nose and mouth without ulcers or lesions, bilateral non obstructive cerumen   NECK: no adenopathy, no asymmetry, masses, or scars  RESP: lungs clear to auscultation - no rales, rhonchi or wheezes  CV: regular rate and rhythm, normal S1 S2, no S3 or S4, no murmur, click or rub, no peripheral edema  ABDOMEN: soft, nontender, no hepatosplenomegaly, no masses and bowel sounds normal  MS: no gross musculoskeletal defects noted, no edema  SKIN: no suspicious lesions or rashes  NEURO: generalized weakness, slow movements, uses cane, mentation intact and speech normal  PSYCH: mentation appears normal, affect normal/bright    Erroneous Encounter on 09/13/2023   Component Date Value Ref Range Status    Cholesterol 09/13/2023 159  <200 mg/dL Final    Triglycerides 09/13/2023 55  <150 mg/dL Final    Direct Measure HDL 09/13/2023 53  >=40 mg/dL Final    LDL Cholesterol Calculated 09/13/2023 95  <=100 mg/dL Final    Non HDL Cholesterol 09/13/2023 106  <130 mg/dL Final    WBC Count 09/13/2023 5.9  4.0 - 11.0 10e3/uL Final    RBC Count 09/13/2023 5.87  4.40 - 5.90 10e6/uL Final    Hemoglobin 09/13/2023 14.4  13.3 - 17.7 g/dL Final    Hematocrit 09/13/2023 44.9  40.0 - 53.0 % Final    MCV 09/13/2023 77 (L)  78 - 100 fL Final    MCH 09/13/2023 24.5 (L)  26.5 - 33.0 pg Final    MCHC 09/13/2023 32.1  31.5 - 36.5 g/dL Final    RDW 09/13/2023 14.6  10.0 - 15.0 % Final    Platelet Count 09/13/2023 209  150 - 450 10e3/uL Final    Sodium 09/13/2023 136  136 - 145 mmol/L Final    Potassium 09/13/2023 4.5  3.4 - 5.3 mmol/L Final    Chloride 09/13/2023 100  98 - 107 mmol/L Final    Carbon Dioxide (CO2) 09/13/2023 27  22 - 29 mmol/L Final    Anion Gap 09/13/2023 9  " 7 - 15 mmol/L Final    Urea Nitrogen 09/13/2023 21.7  8.0 - 23.0 mg/dL Final    Creatinine 09/13/2023 1.10  0.67 - 1.17 mg/dL Final    Calcium 09/13/2023 9.4  8.8 - 10.2 mg/dL Final    Glucose 09/13/2023 85  70 - 99 mg/dL Final    Alkaline Phosphatase 09/13/2023 68  40 - 129 U/L Final    AST 09/13/2023 28  0 - 45 U/L Final    Reference intervals for this test were updated on 6/12/2023 to more accurately reflect our healthy population. There may be differences in the flagging of prior results with similar values performed with this method. Interpretation of those prior results can be made in the context of the updated reference intervals.    ALT 09/13/2023 16  0 - 70 U/L Final    Reference intervals for this test were updated on 6/12/2023 to more accurately reflect our healthy population. There may be differences in the flagging of prior results with similar values performed with this method. Interpretation of those prior results can be made in the context of the updated reference intervals.      Protein Total 09/13/2023 7.1  6.4 - 8.3 g/dL Final    Albumin 09/13/2023 4.2  3.5 - 5.2 g/dL Final    Bilirubin Total 09/13/2023 0.6  <=1.2 mg/dL Final    GFR Estimate 09/13/2023 69  >60 mL/min/1.73m2 Final    Prostate Specific Antigen Screen 09/13/2023 2.37  0.00 - 6.50 ng/mL Final    TSH 09/13/2023 1.50  0.30 - 4.20 uIU/mL Final    Hemoglobin A1C 09/13/2023 5.9 (H)  0.0 - 5.6 % Final    Normal <5.7%   Prediabetes 5.7-6.4%    Diabetes 6.5% or higher     Note: Adopted from ADA consensus guidelines.    Color Urine 09/13/2023 Yellow  Colorless, Straw, Light Yellow, Yellow Final    Appearance Urine 09/13/2023 Clear  Clear Final    Glucose Urine 09/13/2023 Negative  Negative mg/dL Final    Bilirubin Urine 09/13/2023 Negative  Negative Final    Ketones Urine 09/13/2023 Negative  Negative mg/dL Final    Specific Gravity Urine 09/13/2023 1.010  1.003 - 1.035 Final    Blood Urine 09/13/2023 Negative  Negative Final    pH Urine  09/13/2023 6.0  5.0 - 7.0 Final    Protein Albumin Urine 09/13/2023 Negative  Negative mg/dL Final    Urobilinogen Urine 09/13/2023 0.2  0.2, 1.0 E.U./dL Final    Nitrite Urine 09/13/2023 Negative  Negative Final    Leukocyte Esterase Urine 09/13/2023 Trace (A)  Negative Final    Creatinine Urine mg/dL 09/13/2023 24.6  mg/dL Final    The reference ranges have not been established in urine creatinine. The results should be integrated into the clinical context for interpretation.    Albumin Urine mg/L 09/13/2023 18.7  mg/L Final    The reference ranges have not been established in urine albumin. The results should be integrated into the clinical context for interpretation.    Albumin Urine mg/g Cr 09/13/2023 76.02 (H)  0.00 - 17.00 mg/g Cr Final    Microalbuminuria is defined as an albumin:creatinine ratio of 17 to 299 for males and 25 to 299 for females. A ratio of albumin:creatinine of 300 or higher is indicative of overt proteinuria.  Due to biologic variability, positive results should be confirmed by a second, first-morning random or 24-hour timed urine specimen. If there is discrepancy, a third specimen is recommended. When 2 out of 3 results are in the microalbuminuria range, this is evidence for incipient nephropathy and warrants increased efforts at glucose control, blood pressure control, and institution of therapy with an angiotensin-converting-enzyme (ACE) inhibitor (if the patient can tolerate it).      Bacteria Urine 09/13/2023 Few (A)  None Seen /HPF Final    RBC Urine 09/13/2023 0-2  0-2 /HPF /HPF Final    WBC Urine 09/13/2023 0-5  0-5 /HPF /HPF Final           Signed Electronically by: Ray Sun MD

## 2024-04-25 NOTE — LETTER
April 29, 2024      Josh Gonzalez  [address] [facility] [place] [address]        Dear ,    We are writing to inform you of your test results.    Lab results showed borderline diabetes and slightly decreased kidney function.   Should  keep hydrated, avoid NSAID use.   For the back pain recommend to start Tylenol 1000 mg tid.   Recommend to try PT as well.     Resulted Orders   XR Lumbar Spine 2/3 Views    Narrative    XR LUMBAR SPINE 2/3 VIEWS  4/25/2024 3:58 PM     HISTORY: Bilateral low back pain without sciatica, unspecified  chronicity    COMPARISON: 6/14/2021       Impression    IMPRESSION: Minimal levocurvature. Vertebral body heights are  maintained. Pedicles are intact. Sacrum and sacroiliac joints are  unremarkable. Mild straightening of the normal lumbar lordosis.  Anterior osteophytic changes. Posterior element degenerative changes  in the inferior lumbar spine.     JUNIOR ZARAGOZA MD         SYSTEM ID:  [id number]       If you have any questions or concerns, please call the clinic at the number listed above.       Sincerely,      Ray Sun MD

## 2024-04-25 NOTE — LETTER
April 29, 2024      Josh Gonzalez  [address] [address] [facility] [address]        Dear ,    We are writing to inform you of your test results.    Slightly decreased kidney function. Keep hydrated, avoid NSAID use.   Borderline diabetes, keep diet and exercise .     Resulted Orders   Hepatitis C Screen Reflex to HCV RNA Quant and Genotype   Result Value Ref Range    Hepatitis C Antibody Nonreactive Nonreactive      Comment:      A nonreactive screening test result does not exclude the possibility of exposure to or infection with HCV. Nonreactive screening test results in individuals with prior exposure to HCV may be due to antibody levels below the limit of detection of this assay or lack of reactivity to the HCV antigens used in this assay. Patients with recent HCV infections (<3 months from time of exposure) may have false-negative HCV antibody results due to the time needed for seroconversion (average of 8 to 9 weeks).   HEMOGLOBIN A1C   Result Value Ref Range    Hemoglobin A1C 5.9 (H) 0.0 - 5.6 %      Comment:      Normal <5.7%   Prediabetes 5.7-6.4%    Diabetes 6.5% or higher     Note: Adopted from ADA consensus guidelines.   CBC with platelets   Result Value Ref Range    WBC Count 7.5 4.0 - 11.0 10e3/uL    RBC Count 5.62 4.40 - 5.90 10e6/uL    Hemoglobin 13.7 13.3 - 17.7 g/dL    Hematocrit 43.6 40.0 - 53.0 %    MCV 78 78 - 100 fL    MCH 24.4 (L) 26.5 - 33.0 pg    MCHC 31.4 (L) 31.5 - 36.5 g/dL    RDW 15.0 10.0 - 15.0 %    Platelet Count 233 150 - 450 10e3/uL   Comprehensive metabolic panel (BMP + Alb, Alk Phos, ALT, AST, Total. Bili, TP)   Result Value Ref Range    Sodium 137 135 - 145 mmol/L      Comment:      Reference intervals for this test were updated on 09/26/2023 to more accurately reflect our healthy population. There may be differences in the flagging of prior results with similar values performed with this method. Interpretation of those prior results can be made in the context of the  updated reference intervals.     Potassium 4.4 3.4 - 5.3 mmol/L    Carbon Dioxide (CO2) 29 22 - 29 mmol/L    Anion Gap 9 7 - 15 mmol/L    Urea Nitrogen 20.1 8.0 - 23.0 mg/dL    Creatinine 1.28 (H) 0.67 - 1.17 mg/dL    GFR Estimate 57 (L) >60 mL/min/1.73m2    Calcium 9.3 8.8 - 10.2 mg/dL    Chloride 99 98 - 107 mmol/L    Glucose 95 70 - 99 mg/dL    Alkaline Phosphatase 66 40 - 150 U/L      Comment:      Reference intervals for this test were updated on 11/14/2023 to more accurately reflect our healthy population. There may be differences in the flagging of prior results with similar values performed with this method. Interpretation of those prior results can be made in the context of the updated reference intervals.    AST 22 0 - 45 U/L      Comment:      Reference intervals for this test were updated on 6/12/2023 to more accurately reflect our healthy population. There may be differences in the flagging of prior results with similar values performed with this method. Interpretation of those prior results can be made in the context of the updated reference intervals.    ALT 13 0 - 70 U/L      Comment:      Reference intervals for this test were updated on 6/12/2023 to more accurately reflect our healthy population. There may be differences in the flagging of prior results with similar values performed with this method. Interpretation of those prior results can be made in the context of the updated reference intervals.      Protein Total 7.1 6.4 - 8.3 g/dL    Albumin 4.1 3.5 - 5.2 g/dL    Bilirubin Total 0.3 <=1.2 mg/dL   TSH with free T4 reflex   Result Value Ref Range    TSH 1.34 0.30 - 4.20 uIU/mL   UA with Microscopic reflex to Culture - lab collect   Result Value Ref Range    Color Urine Yellow Colorless, Straw, Light Yellow, Yellow    Appearance Urine Clear Clear    Glucose Urine Negative Negative mg/dL    Bilirubin Urine Negative Negative    Ketones Urine Negative Negative mg/dL    Specific Gravity Urine 1.010  1.003 - 1.035    Blood Urine Trace (A) Negative    pH Urine 6.0 5.0 - 7.0    Protein Albumin Urine Negative Negative mg/dL    Urobilinogen Urine 0.2 0.2, 1.0 E.U./dL    Nitrite Urine Negative Negative    Leukocyte Esterase Urine Negative Negative   UA Microscopic with Reflex to Culture   Result Value Ref Range    Bacteria Urine Few (A) None Seen /HPF    RBC Urine 0-2 0-2 /HPF /HPF    WBC Urine 0-5 0-5 /HPF /HPF    Squamous Epithelials Urine Few (A) None Seen /LPF    Narrative    Urine Culture not indicated       If you have any questions or concerns, please call the clinic at the number listed above.       Sincerely,      Ray Sun MD

## 2024-04-26 LAB
ALBUMIN SERPL BCG-MCNC: 4.1 G/DL (ref 3.5–5.2)
ALP SERPL-CCNC: 66 U/L (ref 40–150)
ALT SERPL W P-5'-P-CCNC: 13 U/L (ref 0–70)
ANION GAP SERPL CALCULATED.3IONS-SCNC: 9 MMOL/L (ref 7–15)
AST SERPL W P-5'-P-CCNC: 22 U/L (ref 0–45)
BILIRUB SERPL-MCNC: 0.3 MG/DL
BUN SERPL-MCNC: 20.1 MG/DL (ref 8–23)
CALCIUM SERPL-MCNC: 9.3 MG/DL (ref 8.8–10.2)
CHLORIDE SERPL-SCNC: 99 MMOL/L (ref 98–107)
CREAT SERPL-MCNC: 1.28 MG/DL (ref 0.67–1.17)
DEPRECATED HCO3 PLAS-SCNC: 29 MMOL/L (ref 22–29)
EGFRCR SERPLBLD CKD-EPI 2021: 57 ML/MIN/1.73M2
GLUCOSE SERPL-MCNC: 95 MG/DL (ref 70–99)
HCV AB SERPL QL IA: NONREACTIVE
POTASSIUM SERPL-SCNC: 4.4 MMOL/L (ref 3.4–5.3)
PROT SERPL-MCNC: 7.1 G/DL (ref 6.4–8.3)
SODIUM SERPL-SCNC: 137 MMOL/L (ref 135–145)
TSH SERPL DL<=0.005 MIU/L-ACNC: 1.34 UIU/ML (ref 0.3–4.2)

## 2024-04-29 ENCOUNTER — MEDICAL CORRESPONDENCE (OUTPATIENT)
Dept: HEALTH INFORMATION MANAGEMENT | Facility: CLINIC | Age: 79
End: 2024-04-29
Payer: COMMERCIAL

## 2024-09-23 ENCOUNTER — PATIENT OUTREACH (OUTPATIENT)
Dept: GERIATRIC MEDICINE | Facility: CLINIC | Age: 79
End: 2024-09-23
Payer: COMMERCIAL

## 2024-09-23 NOTE — PROGRESS NOTES
[facility] Care Coordination Contact      [facility] Six-Month Telephone Assessment    6 month telephone assessment completed on 9/23/24.    ER visits: No  Hospitalizations: No  TCU stays: No  Significant health status changes: No, generalized weakness and decline.  Likes to nap a lot  Falls/Injuries: No  ADL/IADL changes: No  Changes in services: No    Caregiver Assessment follow up:  NA    Goals: See Support Plan for goal progress documentation.      Will see member in 6 months for an annual health risk assessment.   Encouraged member to call CC with any questions or concerns in the meantime.     Layla Brewer RN,  PHN  [facility] Care Coordinator  [phone]

## 2024-10-02 ENCOUNTER — MEDICAL CORRESPONDENCE (OUTPATIENT)
Dept: HEALTH INFORMATION MANAGEMENT | Facility: CLINIC | Age: 79
End: 2024-10-02

## 2024-10-24 ENCOUNTER — TRANSFERRED RECORDS (OUTPATIENT)
Dept: HEALTH INFORMATION MANAGEMENT | Facility: CLINIC | Age: 79
End: 2024-10-24

## 2024-11-08 ENCOUNTER — OFFICE VISIT (OUTPATIENT)
Dept: INTERNAL MEDICINE | Facility: CLINIC | Age: 79
End: 2024-11-08
Payer: COMMERCIAL

## 2024-11-08 VITALS
OXYGEN SATURATION: 97 % | TEMPERATURE: 97.4 F | WEIGHT: 120 LBS | HEART RATE: 72 BPM | DIASTOLIC BLOOD PRESSURE: 72 MMHG | HEIGHT: 64 IN | RESPIRATION RATE: 18 BRPM | BODY MASS INDEX: 20.49 KG/M2 | SYSTOLIC BLOOD PRESSURE: 161 MMHG

## 2024-11-08 DIAGNOSIS — M62.838 MUSCLE SPASM: ICD-10-CM

## 2024-11-08 DIAGNOSIS — I10 PRIMARY HYPERTENSION: Primary | ICD-10-CM

## 2024-11-08 DIAGNOSIS — E11.9 TYPE 2 DIABETES MELLITUS WITHOUT COMPLICATION, WITHOUT LONG-TERM CURRENT USE OF INSULIN (H): ICD-10-CM

## 2024-11-08 LAB
ANION GAP SERPL CALCULATED.3IONS-SCNC: 10 MMOL/L (ref 7–15)
BASOPHILS # BLD AUTO: 0.1 10E3/UL (ref 0–0.2)
BASOPHILS NFR BLD AUTO: 1 %
BUN SERPL-MCNC: 16.8 MG/DL (ref 8–23)
CALCIUM SERPL-MCNC: 9.3 MG/DL (ref 8.8–10.4)
CHLORIDE SERPL-SCNC: 99 MMOL/L (ref 98–107)
CREAT SERPL-MCNC: 1.14 MG/DL (ref 0.67–1.17)
EGFRCR SERPLBLD CKD-EPI 2021: 65 ML/MIN/1.73M2
EOSINOPHIL # BLD AUTO: 0.4 10E3/UL (ref 0–0.7)
EOSINOPHIL NFR BLD AUTO: 8 %
ERYTHROCYTE [DISTWIDTH] IN BLOOD BY AUTOMATED COUNT: 14.5 % (ref 10–15)
GLUCOSE SERPL-MCNC: 89 MG/DL (ref 70–99)
HCO3 SERPL-SCNC: 28 MMOL/L (ref 22–29)
HCT VFR BLD AUTO: 43.6 % (ref 40–53)
HGB BLD-MCNC: 14.1 G/DL (ref 13.3–17.7)
IMM GRANULOCYTES # BLD: 0 10E3/UL
IMM GRANULOCYTES NFR BLD: 0 %
LYMPHOCYTES # BLD AUTO: 1.2 10E3/UL (ref 0.8–5.3)
LYMPHOCYTES NFR BLD AUTO: 23 %
MAGNESIUM SERPL-MCNC: 2.2 MG/DL (ref 1.7–2.3)
MCH RBC QN AUTO: 24.7 PG (ref 26.5–33)
MCHC RBC AUTO-ENTMCNC: 32.3 G/DL (ref 31.5–36.5)
MCV RBC AUTO: 76 FL (ref 78–100)
MONOCYTES # BLD AUTO: 0.5 10E3/UL (ref 0–1.3)
MONOCYTES NFR BLD AUTO: 9 %
NEUTROPHILS # BLD AUTO: 2.9 10E3/UL (ref 1.6–8.3)
NEUTROPHILS NFR BLD AUTO: 58 %
PLATELET # BLD AUTO: 237 10E3/UL (ref 150–450)
POTASSIUM SERPL-SCNC: 4.3 MMOL/L (ref 3.4–5.3)
RBC # BLD AUTO: 5.72 10E6/UL (ref 4.4–5.9)
SODIUM SERPL-SCNC: 137 MMOL/L (ref 135–145)
WBC # BLD AUTO: 5 10E3/UL (ref 4–11)

## 2024-11-08 PROCEDURE — 99215 OFFICE O/P EST HI 40 MIN: CPT

## 2024-11-08 PROCEDURE — 83735 ASSAY OF MAGNESIUM: CPT

## 2024-11-08 PROCEDURE — 85025 COMPLETE CBC W/AUTO DIFF WBC: CPT

## 2024-11-08 PROCEDURE — 80048 BASIC METABOLIC PNL TOTAL CA: CPT

## 2024-11-08 PROCEDURE — 36415 COLL VENOUS BLD VENIPUNCTURE: CPT

## 2024-11-08 RX ORDER — LOSARTAN POTASSIUM 25 MG/1
25 TABLET ORAL 2 TIMES DAILY
Qty: 90 TABLET | Refills: 3 | Status: SHIPPED | OUTPATIENT
Start: 2024-11-08

## 2024-11-08 NOTE — PROGRESS NOTES
Assessment & Plan     (I10) Primary hypertension  (primary encounter diagnosis)  Comment: Patient presents to the clinic for chief complaint of high blood pressure.  Patient had an evaluation at his home where he had a blood pressure of 187/80.  He was advised to follow-up with primary care provider to recheck blood pressure.  They have been monitoring blood pressures at home and have been having readings in the 150s and 140s.  He denies any chest pain or shortness of breath.  However he does have some muscle cramps and spasms in his lower extremities.  Will get lab work to rule out electrolyte deficiency and will increase his losartan from once a day to twice a day.  Advised to watch for lightheadedness or dizziness and to report low blood pressure readings.  Goal blood pressure was 140/80.  Medication increase of losartan 25 mg to twice a day.  Labs pending.  Plan: losartan (COZAAR) 25 MG tablet, CBC with         platelets and differential            (E11.9) Type 2 diabetes mellitus without complication, without long-term current use of insulin (H)  Comment: Stable at this time.  Labs pending.  Plan: CBC with platelets and differential            (M62.838) Muscle spasm  Comment: Patient reports having muscle spasms in his lower extremities.  Patient is very sedentary And does not eat many meals a day.  Clinical picture suggests muscle stiffness from inactivity and electrolyte deficiency due to lack of nutrients.  Will order labs to ensure electrolytes are within normal limits.  Plan: Basic metabolic panel  (Ca, Cl, CO2, Creat,         Gluc, K, Na, BUN), Magnesium                45 minutes spent by me on the date of the encounter doing chart review, review of test results, interpretation of tests, patient visit, documentation, and discussion with family           Subjective   Man is a 79 year old, presenting for the following health issues: Patient state he has had some elevated blood pressures at home. He is  "taking 25 mg of losartan daily. He doesn't do much movement throughout the day. He also has some leg spasms in the AM and at night.   He is drinking a lot of water but not enough food.   Does not get much exercise at all.   He is also due to TDAP today. States he will be getting this on the 19th.         Blood Pressure Check        11/8/2024    10:47 AM   Additional Questions   Roomed by DEIDRE Pennington               Review of Systems  Constitutional, HEENT, cardiovascular, pulmonary, gi and gu systems are negative, except as otherwise noted.      Objective    BP (!) 157/75   Pulse 68   Temp 97.4  F (36.3  C)   Resp 18   Ht 1.626 m (5' 4\")   Wt 54.4 kg (120 lb)   SpO2 97%   BMI 20.60 kg/m    Body mass index is 20.6 kg/m .  Physical Exam   GENERAL: alert and no distress  RESP: lungs clear to auscultation - no rales, rhonchi or wheezes  CV: regular rate and rhythm, normal S1 S2, no S3 or S4, no murmur, click or rub, no peripheral edema  MS: no gross musculoskeletal defects noted, no edema            Signed Electronically by: CURRY Do CNP    "

## 2024-11-12 ENCOUNTER — TELEPHONE (OUTPATIENT)
Dept: INTERNAL MEDICINE | Facility: CLINIC | Age: 79
End: 2024-11-12
Payer: COMMERCIAL

## 2024-11-12 NOTE — TELEPHONE ENCOUNTER
"--message from CURRY Do CNP  11/12/2024  1:04 PM CST--  \"Please call patient stating his labs are normal. Thanks\"    Called patient's phone # using [employer]  ID #[id number] and spouse answered (on CTC).    Spouse asked why the BP fluctuates. Informed that it could be due to the body adjusting to the newly increased dose of losartan, other factors like stress, activity level, diet and time of the days can also affect BP.     Spouse reports that the pt does not regularly take Losartan and only takes it when BP is high. Patient stopped taking Losartan 3 days ago and only takes 1 tablet (not 2 tablets as prescribed) when needed. She added that patient's BP is usually on the lower side and it was only on 10/22/24 when the nurse visited them that the pt's BP was high. Advised on the importance of regularly taking BP med but spouse seems concerned that BP could drop.    Recent BP readings:  11/12/24: 128/71 mmHg  11/11/24: 110/61 mmHg  11/10/24: 144/68 mmHg  11/09/24: 151/73 mmHg  11/08/24 after OV: 152/76 and 133/69 mmHg    Routing to provider for review and advise. Should pt go back to Losartan once a day? At what BP level pt should hold off on taking Losartan?         "

## 2024-11-13 NOTE — TELEPHONE ENCOUNTER
This is a daily medication and is advised to take regularily. In the visit he told me he was taking it everyday, that is why it was increased. Im not sure who is telling the accurate story but it should be communicated to take it once a day for a goal blood pressure of 140/80 or less. If he is achieving that with once a day, they he doesn't need it twice a day. If he is still elevated over that number in the evening, he should take his second dose.

## 2024-11-13 NOTE — TELEPHONE ENCOUNTER
Called patient with a [employer]  and discussed provider message.      Patient wanted writer to speak to spouse due to poor memory.  Spouse stated patients memory is very poor and he doesn't remember his medications.  Stated she is not giving patient the medication every day and only gives it when his blood pressure is elevated.  Advised medication should be given daily.  Spouse will give medication to patient every day and will be monitoring his blood pressure daily.  Advised that if blood pressure goes above 140/80, to give second dose(per provider message).     Stated if patient gets the medication daily, his blood pressure will not go above 140/80.

## 2025-02-07 ENCOUNTER — TELEPHONE (OUTPATIENT)
Dept: INTERNAL MEDICINE | Facility: CLINIC | Age: 80
End: 2025-02-07
Payer: COMMERCIAL

## 2025-02-07 NOTE — TELEPHONE ENCOUNTER
Forms/Letter Request    Type of form/letter: DME (wheelchair, hospital bed)    Type of DME requested: 8oz Ensure & pullups    Do we have the form/letter: Yes: placed in provider mailbox for signature    Who is the form from? Steven [phone] (if other please explain)    Where did/will the form come from? form was faxed in    When is form/letter needed by: 5-7    How would you like the form/letter returned: Fax : [phone]    Patient Notified form requests are processed in 5-7 business days:Yes    Okay to leave a detailed message?: NA

## 2025-02-13 ENCOUNTER — PATIENT OUTREACH (OUTPATIENT)
Dept: GERIATRIC MEDICINE | Facility: CLINIC | Age: 80
End: 2025-02-13
Payer: COMMERCIAL

## 2025-02-13 ASSESSMENT — LIFESTYLE VARIABLES
SKIP TO QUESTIONS 9-10: 1
AUDIT-C TOTAL SCORE: 0

## 2025-02-13 NOTE — PROGRESS NOTES
[facility] Care Coordination Contact    [facility] Home Visit Assessment     Home visit for Health Risk Assessment with Josh Gonzalez completed on February 13, 2025    Type of residence:: Private home - stairs  Current living arrangement:: I live in a private home with family     Assessment completed with:: Patient, Spouse or significant other, Other (, Sruthi)    Current Care Plan  Member currently receiving the following home care services:     Member currently receiving the following community resources: [facility], [facility], Day Care      Medication Review  Medication reconciliation completed in Epic: Yes  Medication set-up & administration: Family/informal caregiver sets up daily.  Family caregiver administers medications.  Medication Risk Assessment Medication (1 or more, place referral to MTM): N/A: No risk factors identified  MTM Referral Placed: No: No risk factors idenified    Mental/Behavioral Health   Depression Screening:           Mental health DX:: No        Falls Assessment:   Fallen 2 or more times in the past year?: No   Any fall with injury in the past year?: No    ADL/IADL Dependencies:   Dependent ADLs:: Ambulation-cane, Bathing, Dressing, Grooming, Incontinence, Transfers  Dependent IADLs:: Cleaning, Cooking, Laundry, Shopping, Meal Preparation, Medication Management, Money Management, Transportation, Incontinence    Health Plan sponsored benefits: [employer]: Shared information regarding [employer]. Reviewed preventative health screening and health plan supplemental benefits/incentives. Reviewed medication disposal form.    CFSS Assessment completed at visit: Yes Annual CFSS assessment indicated 4 hours per day of CFSS. This is an increase from the previous assessment.     Elderly Waiver Eligibility: Yes-will continue on EW    Care Plan & Recommendations: PCA hours went from 3.5 hours/day to 4 hours/day   More withdrawn. History of depression and no longer  is on medications.  Family is reporting his mental health is improved when he is able to socialize with the men at the adult day care. Will increase to 4 days of adult day care to help with the depression.     See [facility] Assessment for detailed assessment information.    Follow-Up Plan: Member informed of future contact, plan to f/u with member with a 6 month telephone assessment.  Contact information shared with member and family, encouraged member to call with any questions or concerns at any time.    [facility] care continuum providers: Please see Snapshot and Care Management Flowsheets for Specific details of care plan.    This CC note routed to PCP, Ray Sun, RN,  PHN  [facility] Care Coordinator  [phone]

## 2025-03-25 ENCOUNTER — OFFICE VISIT (OUTPATIENT)
Dept: INTERNAL MEDICINE | Facility: CLINIC | Age: 80
End: 2025-03-25
Payer: COMMERCIAL

## 2025-03-25 VITALS
DIASTOLIC BLOOD PRESSURE: 72 MMHG | WEIGHT: 123.7 LBS | TEMPERATURE: 96.8 F | SYSTOLIC BLOOD PRESSURE: 158 MMHG | RESPIRATION RATE: 18 BRPM | HEIGHT: 64 IN | BODY MASS INDEX: 21.12 KG/M2 | HEART RATE: 62 BPM | OXYGEN SATURATION: 98 %

## 2025-03-25 DIAGNOSIS — Z23 NEED FOR SHINGLES VACCINE: ICD-10-CM

## 2025-03-25 DIAGNOSIS — Z29.11 NEED FOR VACCINATION AGAINST RESPIRATORY SYNCYTIAL VIRUS: ICD-10-CM

## 2025-03-25 DIAGNOSIS — H90.3 BILATERAL SENSORINEURAL HEARING LOSS: ICD-10-CM

## 2025-03-25 DIAGNOSIS — F33.1 MODERATE RECURRENT MAJOR DEPRESSION (H): ICD-10-CM

## 2025-03-25 DIAGNOSIS — I10 PRIMARY HYPERTENSION: ICD-10-CM

## 2025-03-25 DIAGNOSIS — Z00.00 ENCOUNTER FOR PREVENTATIVE ADULT HEALTH CARE EXAMINATION: Primary | ICD-10-CM

## 2025-03-25 DIAGNOSIS — F33.41 RECURRENT MAJOR DEPRESSIVE DISORDER, IN PARTIAL REMISSION: ICD-10-CM

## 2025-03-25 DIAGNOSIS — Z12.5 SCREENING FOR PROSTATE CANCER: ICD-10-CM

## 2025-03-25 DIAGNOSIS — E11.9 TYPE 2 DIABETES MELLITUS WITHOUT COMPLICATION, WITHOUT LONG-TERM CURRENT USE OF INSULIN (H): ICD-10-CM

## 2025-03-25 DIAGNOSIS — Z23 NEED FOR TDAP VACCINATION: ICD-10-CM

## 2025-03-25 LAB
ALBUMIN SERPL BCG-MCNC: 4.1 G/DL (ref 3.5–5.2)
ALBUMIN UR-MCNC: NEGATIVE MG/DL
ALP SERPL-CCNC: 60 U/L (ref 40–150)
ALT SERPL W P-5'-P-CCNC: 16 U/L (ref 0–70)
ANION GAP SERPL CALCULATED.3IONS-SCNC: 11 MMOL/L (ref 7–15)
APPEARANCE UR: CLEAR
AST SERPL W P-5'-P-CCNC: 24 U/L (ref 0–45)
BILIRUB SERPL-MCNC: 0.4 MG/DL
BILIRUB UR QL STRIP: NEGATIVE
BUN SERPL-MCNC: 19.4 MG/DL (ref 8–23)
CA PHOS CRY #/AREA URNS HPF: ABNORMAL /HPF
CALCIUM SERPL-MCNC: 9.2 MG/DL (ref 8.8–10.4)
CHLORIDE SERPL-SCNC: 98 MMOL/L (ref 98–107)
CHOLEST SERPL-MCNC: 218 MG/DL
COLOR UR AUTO: YELLOW
CREAT SERPL-MCNC: 1.12 MG/DL (ref 0.67–1.17)
CREAT UR-MCNC: 38.5 MG/DL
EGFRCR SERPLBLD CKD-EPI 2021: 66 ML/MIN/1.73M2
ERYTHROCYTE [DISTWIDTH] IN BLOOD BY AUTOMATED COUNT: 14.5 % (ref 10–15)
EST. AVERAGE GLUCOSE BLD GHB EST-MCNC: 120 MG/DL
FASTING STATUS PATIENT QL REPORTED: NO
FASTING STATUS PATIENT QL REPORTED: NO
GLUCOSE SERPL-MCNC: 77 MG/DL (ref 70–99)
GLUCOSE UR STRIP-MCNC: NEGATIVE MG/DL
HBA1C MFR BLD: 5.8 % (ref 0–5.6)
HCO3 SERPL-SCNC: 28 MMOL/L (ref 22–29)
HCT VFR BLD AUTO: 42.3 % (ref 40–53)
HDLC SERPL-MCNC: 52 MG/DL
HGB BLD-MCNC: 13.8 G/DL (ref 13.3–17.7)
HGB UR QL STRIP: NEGATIVE
KETONES UR STRIP-MCNC: NEGATIVE MG/DL
LDLC SERPL CALC-MCNC: 151 MG/DL
LEUKOCYTE ESTERASE UR QL STRIP: NEGATIVE
MCH RBC QN AUTO: 24.9 PG (ref 26.5–33)
MCHC RBC AUTO-ENTMCNC: 32.6 G/DL (ref 31.5–36.5)
MCV RBC AUTO: 76 FL (ref 78–100)
MICROALBUMIN UR-MCNC: 40.4 MG/L
MICROALBUMIN/CREAT UR: 104.94 MG/G CR (ref 0–17)
NITRATE UR QL: NEGATIVE
NONHDLC SERPL-MCNC: 166 MG/DL
PH UR STRIP: 7 [PH] (ref 5–7)
PLATELET # BLD AUTO: 222 10E3/UL (ref 150–450)
POTASSIUM SERPL-SCNC: 4.4 MMOL/L (ref 3.4–5.3)
PROT SERPL-MCNC: 7 G/DL (ref 6.4–8.3)
PSA SERPL DL<=0.01 NG/ML-MCNC: 2.7 NG/ML
RBC # BLD AUTO: 5.55 10E6/UL (ref 4.4–5.9)
RBC #/AREA URNS AUTO: ABNORMAL /HPF
SODIUM SERPL-SCNC: 137 MMOL/L (ref 135–145)
SP GR UR STRIP: 1.01 (ref 1–1.03)
SQUAMOUS #/AREA URNS AUTO: ABNORMAL /LPF
TRIGL SERPL-MCNC: 77 MG/DL
TSH SERPL DL<=0.005 MIU/L-ACNC: 1.16 UIU/ML (ref 0.3–4.2)
UROBILINOGEN UR STRIP-ACNC: 0.2 E.U./DL
WBC # BLD AUTO: 5.4 10E3/UL (ref 4–11)
WBC #/AREA URNS AUTO: ABNORMAL /HPF

## 2025-03-25 RX ORDER — LOSARTAN POTASSIUM 25 MG/1
25 TABLET ORAL DAILY
Qty: 90 TABLET | Refills: 3 | Status: SHIPPED | OUTPATIENT
Start: 2025-03-25

## 2025-03-25 SDOH — HEALTH STABILITY: PHYSICAL HEALTH: ON AVERAGE, HOW MANY DAYS PER WEEK DO YOU ENGAGE IN MODERATE TO STRENUOUS EXERCISE (LIKE A BRISK WALK)?: 0 DAYS

## 2025-03-25 ASSESSMENT — SOCIAL DETERMINANTS OF HEALTH (SDOH): HOW OFTEN DO YOU GET TOGETHER WITH FRIENDS OR RELATIVES?: ONCE A WEEK

## 2025-03-25 ASSESSMENT — PAIN SCALES - GENERAL: PAINLEVEL_OUTOF10: SEVERE PAIN (8)

## 2025-03-25 NOTE — LETTER
March 26, 2025      Josh Gonzalez  [id number] [address] [facility] [id number]        Dear ,    We are writing to inform you of your test results.    Your recent labs are all normal except for High cholesterol. Keep diet and exercise.     Please give us a call with any questions.        Ray Sun MD       Resulted Orders   Lipid panel reflex to direct LDL Non-fasting   Result Value Ref Range    Cholesterol 218 (H) <200 mg/dL    Triglycerides 77 <150 mg/dL    Direct Measure HDL 52 >=40 mg/dL    LDL Cholesterol Calculated 151 (H) <100 mg/dL    Non HDL Cholesterol 166 (H) <130 mg/dL    Patient Fasting > 8hrs? No     Narrative    Cholesterol  Desirable: < 200 mg/dL  Borderline High: 200 - 239 mg/dL  High: >= 240 mg/dL    Triglycerides  Normal: < 150 mg/dL  Borderline High: 150 - 199 mg/dL  High: 200-499 mg/dL  Very High: >= 500 mg/dL    Direct Measure HDL  Female: >= 50 mg/dL   Male: >= 40 mg/dL    LDL Cholesterol  Desirable: < 100 mg/dL  Above Desirable: 100 - 129 mg/dL   Borderline High: 130 - 159 mg/dL   High:  160 - 189 mg/dL   Very High: >= 190 mg/dL    Non HDL Cholesterol  Desirable: < 130 mg/dL  Above Desirable: 130 - 159 mg/dL  Borderline High: 160 - 189 mg/dL  High: 190 - 219 mg/dL  Very High: >= 220 mg/dL   Albumin Random Urine Quantitative with Creat Ratio   Result Value Ref Range    Creatinine Urine mg/dL 38.5 mg/dL      Comment:      The reference ranges have not been established in urine creatinine. The results should be integrated into the clinical context for interpretation.    Albumin Urine mg/L 40.4 mg/L      Comment:      The reference ranges have not been established in urine albumin. The results should be integrated into the clinical context for interpretation.    Albumin Urine mg/g Cr 104.94 (H) 0.00 - 17.00 mg/g Cr      Comment:      Microalbuminuria is defined as an albumin:creatinine ratio of 17 to 299 for males and 25 to 299 for females. A ratio of albumin:creatinine of 300 or  higher is indicative of overt proteinuria.  Due to biologic variability, positive results should be confirmed by a second, first-morning random or 24-hour timed urine specimen. If there is discrepancy, a third specimen is recommended. When 2 out of 3 results are in the microalbuminuria range, this is evidence for incipient nephropathy and warrants increased efforts at glucose control, blood pressure control, and institution of therapy with an angiotensin-converting-enzyme (ACE) inhibitor (if the patient can tolerate it).     HEMOGLOBIN A1C   Result Value Ref Range    Estimated Average Glucose 120 (H) <117 mg/dL    Hemoglobin A1C 5.8 (H) 0.0 - 5.6 %      Comment:      Normal <5.7%   Prediabetes 5.7-6.4%    Diabetes 6.5% or higher     Note: Adopted from ADA consensus guidelines.   CBC with platelets   Result Value Ref Range    WBC Count 5.4 4.0 - 11.0 10e3/uL    RBC Count 5.55 4.40 - 5.90 10e6/uL    Hemoglobin 13.8 13.3 - 17.7 g/dL    Hematocrit 42.3 40.0 - 53.0 %    MCV 76 (L) 78 - 100 fL    MCH 24.9 (L) 26.5 - 33.0 pg    MCHC 32.6 31.5 - 36.5 g/dL    RDW 14.5 10.0 - 15.0 %    Platelet Count 222 150 - 450 10e3/uL   Comprehensive metabolic panel (BMP + Alb, Alk Phos, ALT, AST, Total. Bili, TP)   Result Value Ref Range    Sodium 137 135 - 145 mmol/L    Potassium 4.4 3.4 - 5.3 mmol/L    Carbon Dioxide (CO2) 28 22 - 29 mmol/L    Anion Gap 11 7 - 15 mmol/L    Urea Nitrogen 19.4 8.0 - 23.0 mg/dL    Creatinine 1.12 0.67 - 1.17 mg/dL    GFR Estimate 66 >60 mL/min/1.73m2      Comment:      eGFR calculated using 2021 CKD-EPI equation.    Calcium 9.2 8.8 - 10.4 mg/dL    Chloride 98 98 - 107 mmol/L    Glucose 77 70 - 99 mg/dL    Alkaline Phosphatase 60 40 - 150 U/L    AST 24 0 - 45 U/L    ALT 16 0 - 70 U/L    Protein Total 7.0 6.4 - 8.3 g/dL    Albumin 4.1 3.5 - 5.2 g/dL    Bilirubin Total 0.4 <=1.2 mg/dL    Patient Fasting > 8hrs? No    TSH with free T4 reflex   Result Value Ref Range    TSH 1.16 0.30 - 4.20 uIU/mL   PSA,  screen   Result Value Ref Range    Prostate Specific Antigen Screen 2.70 ng/mL      Comment:      No reference ranges have been established for patients over 80 years.    Narrative    This result is obtained using the Roche Elecsys total PSA method on the jackson e801 immunoassay analyzer, which is an ultrasensitive method. Results obtained with different assay methods or kits cannot be used interchangeably.  This test is intended for initial prostate cancer screening. PSA values exceeding the age-specific limits are suspicious for prostate disease, but additional testing, such as prostate biopsy, is needed to diagnose prostate pathology. The American Cancer Society recommends annual examination with digital rectal examination and serum PSA beginning at age 50 and for men with a life expectancy of at least 10 years after detection of prostate cancer. For men in high-risk groups, such as  Americans or men with a first-degree relative diagnosed at a younger age, testing should begin at a younger age. It is generally recommended that information be provided to patients about the benefits and limitations of testing and treatment so they can make informed decisions.   UA with Microscopic reflex to Culture - lab collect   Result Value Ref Range    Color Urine Yellow Colorless, Straw, Light Yellow, Yellow    Appearance Urine Clear Clear    Glucose Urine Negative Negative mg/dL    Bilirubin Urine Negative Negative    Ketones Urine Negative Negative mg/dL    Specific Gravity Urine 1.010 1.003 - 1.035    Blood Urine Negative Negative    pH Urine 7.0 5.0 - 7.0    Protein Albumin Urine Negative Negative mg/dL    Urobilinogen Urine 0.2 0.2, 1.0 E.U./dL    Nitrite Urine Negative Negative    Leukocyte Esterase Urine Negative Negative   UA Microscopic with Reflex to Culture   Result Value Ref Range    RBC Urine 0-2 0-2 /HPF /HPF    WBC Urine 0-5 0-5 /HPF /HPF    Squamous Epithelials Urine Few (A) None Seen /LPF    Calcium  Phosphate Crystals Urine Few (A) None Seen /HPF    Narrative    Urine Culture not indicated       If you have any questions or concerns, please call the clinic at the number listed above.       Sincerely,      Ray Sun MD    Electronically signed

## 2025-03-25 NOTE — PROGRESS NOTES
Preventive Care Visit  [facility]  Ray Sun MD, Internal Medicine  Mar 25, 2025      Assessment & Plan     Need for shingles vaccine    - zoster vaccine recombinant adjuvanted (SHINGRIX) injection; Inject 0.5 mLs into the muscle once for 1 dose. Pharmacist administered    Need for Tdap vaccination    - Tdap, tetanus-diptheria-acell pertussis, (BOOSTRIX) 5-2.5-18.5 LF-MCG/0.5 ANTONIO injection; Inject 0.5 mLs into the muscle once for 1 dose.    Need for vaccination against respiratory syncytial virus      Type 2 diabetes mellitus without complication, without long-term current use of insulin (H)  Assess lab  Diet controlled DM   - Lipid panel reflex to direct LDL Non-fasting  - Albumin Random Urine Quantitative with Creat Ratio  - HEMOGLOBIN A1C  - OFFICE/OUTPT VISIT,EST,LEVL IV    Primary hypertension  Advised to resume losartan, start with lower dose 25 mg daily  Monitor BP    - losartan (COZAAR) 25 MG tablet; Take 1 tablet (25 mg) by mouth daily.  - CBC with platelets  - Comprehensive metabolic panel (BMP + Alb, Alk Phos, ALT, AST, Total. Bili, TP)  - TSH with free T4 reflex  - UA with Microscopic reflex to Culture - lab collect  - OFFICE/OUTPT VISIT,EST,LEVL IV    Recurrent major depressive disorder, in partial remission  Controlled symptoms  Continue SSRI  - sertraline (ZOLOFT) 50 MG tablet; Take 1 tablet (50 mg) by mouth daily.    Encounter for preventative adult health care examination  advised regular aerobic activity, low cholesterol, low salt diet, wearing seat belt,  self examinations, sunscreen protection.Obtain screening cholesterol, immunizations reviewed.    - zoster vaccine recombinant adjuvanted (SHINGRIX) injection; Inject 0.5 mLs into the muscle once for 1 dose. Pharmacist administered  - Tdap, tetanus-diptheria-acell pertussis, (BOOSTRIX) 5-2.5-18.5 LF-MCG/0.5 ANTONIO injection; Inject 0.5 mLs into the muscle once for 1 dose.  - CBC with platelets  - Comprehensive  metabolic panel (BMP + Alb, Alk Phos, ALT, AST, Total. Bili, TP)  - TSH with free T4 reflex  - PSA, screen  - UA with Microscopic reflex to Culture - lab collect    Screening for prostate cancer    - PSA, screen    Moderate recurrent major depression (H)    - OFFICE/OUTPT VISIT,EST,LEVL IV    Bilateral sensorineural hearing loss  Refer for audiology testing   - Adult Audiology  Referral; Future  - OFFICE/OUTPT VISIT,EST,LEVL IV    Patient has been advised of split billing requirements and indicates understanding: Yes        Counseling  Appropriate preventive services were addressed with this patient via screening, questionnaire, or discussion as appropriate for fall prevention, nutrition, physical activity, Tobacco-use cessation, social engagement, weight loss and cognition.  Checklist reviewing preventive services available has been given to the patient.  Reviewed patient's diet, addressing concerns and/or questions.   Discussed possible causes of fatigue. Updated plan of care.  Patient reported difficulty with activities of daily living were addressed today.Information on urinary incontinence and treatment options given to patient.       See Patient Instructions    Subjective   Man is a 79 year old, presenting for the following:  Wellness Visit        3/25/2025    10:52 AM   Additional Questions   Roomed by Ebony BUCKNER   Accompanied by wife, daughter       HPI    No acute complaints, no medication change or new medical conditions.  Has  history of  DM. On diet. Blood sugars are controlled.  No paresthesias. No hypoglycemias.  Has h/o HTN. on medical treatment. BP has been controlled. No side effects from medications. No CP, HA, dizziness. good compliance with medications and low salt diet.  Has h/o depression. On medical treatment, controlled, no side effects. No depressive symptoms or suicidal ideation.  Concern for decreased hearing.   Has had episodes of dizziness. Feels weak.          Advance Care  Planning  Patient does not have a Health Care Directive: Discussed advance care planning with patient; however, patient declined at this time.      3/25/2025   General Health   How would you rate your overall physical health? (!) POOR         2021   Nutrition   At least 4 servings of fruits and vegetables/day No         2021   Exercise   Frequency of exercise: 1 day/week            2021   Activities of Daily Living- Home Safety   Needs help with the following daily activites Transportation    Shopping    Preparing meals    Housework    Laundry    Medication administration    Money management   Safety concerns in the home Throw rugs in the hallway       Multiple values from one day are sorted in reverse-chronological order          No data to display                  2021   Hearing Screening   Hearing concerns? Difficulty following a conversation in a noisy restaurant or crowded room    Feel that people are mumbling or not speaking clearly    Difficult to understand a speaker at a public meeting or Alevism service    Need to ask people to speak up or repeat themselves    Difficulty understanding soft or whispered speech    Difficulty understanding speech on the telephone       Multiple values from one day are sorted in reverse-chronological order            No data to display                Unable to complete PHQ9 as patient is unable to answer  Today's PHQ-9 Score:       3/25/2025    10:47 AM   PHQ-9 SCORE   PHQ-9 Total Score MyChart Incomplete         2021   Substance Use   Alcohol more than 3/day or more than 7/wk No     Social History     Tobacco Use    Smoking status: Former     Current packs/day: 0.00     Average packs/day: 2.0 packs/day for 41.6 years (83.2 ttl pk-yrs)     Types: Cigarettes     Start date: 2/3/1960     Quit date: 2001     Years since quittin.5    Smokeless tobacco: Never   Vaping Use    Vaping status: Never Used   Substance Use Topics    Alcohol use: No      "Alcohol/week: 0.0 standard drinks of alcohol    Drug use: No                 Reviewed and updated as needed this visit by Provider                    Lab work is in process  Labs reviewed in EPIC  Current providers sharing in care for this patient include:  Patient Care Team:  Ray Sun MD as PCP - General (Internal Medicine)  Ray Sun MD as Assigned PCP  Layla Brewer RN as Lead Care Coordinator (Primary Care - CC)    The following health maintenance items are reviewed in Epic and correct as of today:  Health Maintenance   Topic Date Due    DIABETIC FOOT EXAM  Never done    DEPRESSION ACTION PLAN  Never done    ZOSTER IMMUNIZATION (1 of 2) Never done    RSV VACCINE (1 - 1-dose 75+ series) Never done    DTAP/TDAP/TD IMMUNIZATION (2 - Td or Tdap) 01/11/2022    EYE EXAM  08/18/2024    LIPID  09/13/2024    MICROALBUMIN  09/13/2024    ANNUAL REVIEW OF HM ORDERS  09/15/2024    A1C  10/25/2024    PHQ-9  10/25/2024    DEPRESSION 12 MO INDEX REPEAT PHQ-9  02/24/2025    MEDICARE ANNUAL WELLNESS VISIT  04/25/2025    COVID-19 Vaccine (7 - 2024-25 season) 05/19/2025    BMP  11/08/2025    FALL RISK ASSESSMENT  03/25/2026    ADVANCE CARE PLANNING  03/25/2030    INFLUENZA VACCINE  Completed    Pneumococcal Vaccine: 50+ Years  Completed    HPV IMMUNIZATION  Aged Out    MENINGITIS IMMUNIZATION  Aged Out    COLORECTAL CANCER SCREENING  Discontinued    LUNG CANCER SCREENING  Discontinued         Review of Systems  Constitutional, HEENT, cardiovascular, pulmonary, GI, , musculoskeletal, neuro, skin, endocrine and psych systems are negative, except as otherwise noted.     Objective    Exam  BP (!) 158/72 (BP Location: Left arm, Cuff Size: Adult Regular)   Pulse 62   Temp 96.8  F (36  C) (Tympanic)   Resp 18   Ht 1.626 m (5' 4\")   Wt 56.1 kg (123 lb 11.2 oz)   SpO2 98%   BMI 21.23 kg/m     Estimated body mass index is 21.23 kg/m  as calculated from the following:    Height as of this encounter: 1.626 m (5' " "4\").    Weight as of this encounter: 56.1 kg (123 lb 11.2 oz).    Physical Exam  GENERAL: alert and no distress, frail, minimal verbal responses   EYES: Eyes grossly normal to inspection, PERRL and conjunctivae and sclerae normal  HENT: ear canals and TM's normal, nose and mouth without ulcers or lesions  NECK: no adenopathy, no asymmetry, masses, or scars  RESP: lungs clear to auscultation - no rales, rhonchi or wheezes  CV: regular rate and rhythm, normal S1 S2, no S3 or S4, no murmur, click or rub, no peripheral edema  ABDOMEN: soft, nontender, no hepatosplenomegaly, no masses and bowel sounds normal  MS: no gross musculoskeletal defects noted, no edema  SKIN: no suspicious lesions or rashes  NEURO: generalized muscle weakness, unstable gait, uses a cane, flat affect           3/25/2025   Mini Cog   Mini-Cog Not Completed (choose reason) Deaf/hard of hearing              Signed Electronically by: Ray Sun MD    "

## 2025-06-29 ENCOUNTER — APPOINTMENT (OUTPATIENT)
Dept: GENERAL RADIOLOGY | Facility: CLINIC | Age: 80
End: 2025-06-29
Attending: EMERGENCY MEDICINE
Payer: COMMERCIAL

## 2025-06-29 ENCOUNTER — HOSPITAL ENCOUNTER (INPATIENT)
Facility: CLINIC | Age: 80
LOS: 3 days | Discharge: HOME | End: 2025-07-02
Attending: EMERGENCY MEDICINE | Admitting: INTERNAL MEDICINE
Payer: COMMERCIAL

## 2025-06-29 ENCOUNTER — APPOINTMENT (OUTPATIENT)
Dept: CT IMAGING | Facility: CLINIC | Age: 80
End: 2025-06-29
Attending: EMERGENCY MEDICINE
Payer: COMMERCIAL

## 2025-06-29 ENCOUNTER — OFFICE VISIT (OUTPATIENT)
Dept: URGENT CARE | Facility: URGENT CARE | Age: 80
End: 2025-06-29
Payer: COMMERCIAL

## 2025-06-29 ENCOUNTER — NURSE TRIAGE (OUTPATIENT)
Dept: NURSING | Facility: CLINIC | Age: 80
End: 2025-06-29
Payer: COMMERCIAL

## 2025-06-29 VITALS
HEIGHT: 64 IN | HEART RATE: 88 BPM | RESPIRATION RATE: 16 BRPM | DIASTOLIC BLOOD PRESSURE: 69 MMHG | TEMPERATURE: 100.6 F | WEIGHT: 124 LBS | OXYGEN SATURATION: 96 % | BODY MASS INDEX: 21.17 KG/M2 | SYSTOLIC BLOOD PRESSURE: 146 MMHG

## 2025-06-29 DIAGNOSIS — N30.00 ACUTE CYSTITIS WITHOUT HEMATURIA: Primary | ICD-10-CM

## 2025-06-29 DIAGNOSIS — R06.6 HICCUPS: ICD-10-CM

## 2025-06-29 DIAGNOSIS — R26.81 GAIT INSTABILITY: ICD-10-CM

## 2025-06-29 DIAGNOSIS — A41.9 SEPSIS DUE TO URINARY TRACT INFECTION (H): ICD-10-CM

## 2025-06-29 DIAGNOSIS — Z71.89 OTHER SPECIFIED COUNSELING: Chronic | ICD-10-CM

## 2025-06-29 DIAGNOSIS — N30.00 ACUTE CYSTITIS WITHOUT HEMATURIA: ICD-10-CM

## 2025-06-29 DIAGNOSIS — I72.3 ILIAC ARTERY ANEURYSM: ICD-10-CM

## 2025-06-29 DIAGNOSIS — M54.50 ACUTE MIDLINE LOW BACK PAIN WITHOUT SCIATICA: ICD-10-CM

## 2025-06-29 DIAGNOSIS — I74.5 ILIAC ARTERY OCCLUSION (H): ICD-10-CM

## 2025-06-29 DIAGNOSIS — Z86.39 HISTORY OF TYPE 2 DIABETES MELLITUS: ICD-10-CM

## 2025-06-29 DIAGNOSIS — R39.9 LOWER URINARY TRACT SYMPTOMS (LUTS): ICD-10-CM

## 2025-06-29 DIAGNOSIS — N39.0 SEPSIS DUE TO URINARY TRACT INFECTION (H): ICD-10-CM

## 2025-06-29 DIAGNOSIS — R50.9 FEVER IN ADULT: ICD-10-CM

## 2025-06-29 DIAGNOSIS — R30.0 DYSURIA: Primary | ICD-10-CM

## 2025-06-29 LAB
ALBUMIN SERPL BCG-MCNC: 3.7 G/DL (ref 3.5–5.2)
ALBUMIN UR-MCNC: 70 MG/DL
ALBUMIN UR-MCNC: >=300 MG/DL
ALP SERPL-CCNC: 64 U/L (ref 40–150)
ALT SERPL W P-5'-P-CCNC: 17 U/L (ref 0–70)
ANION GAP SERPL CALCULATED.3IONS-SCNC: 13 MMOL/L (ref 7–15)
APPEARANCE UR: ABNORMAL
APPEARANCE UR: CLEAR
AST SERPL W P-5'-P-CCNC: 16 U/L (ref 0–45)
BACTERIA #/AREA URNS HPF: ABNORMAL /HPF
BACTERIA #/AREA URNS HPF: ABNORMAL /HPF
BASOPHILS # BLD MANUAL: 0 10E3/UL (ref 0–0.2)
BASOPHILS NFR BLD MANUAL: 0 %
BILIRUB SERPL-MCNC: 1.1 MG/DL
BILIRUB UR QL STRIP: NEGATIVE
BILIRUB UR QL STRIP: NEGATIVE
BUN SERPL-MCNC: 15 MG/DL (ref 8–23)
CALCIUM SERPL-MCNC: 8.9 MG/DL (ref 8.8–10.4)
CHLORIDE SERPL-SCNC: 92 MMOL/L (ref 98–107)
COLOR UR AUTO: YELLOW
COLOR UR AUTO: YELLOW
CREAT SERPL-MCNC: 1.06 MG/DL (ref 0.67–1.17)
EGFRCR SERPLBLD CKD-EPI 2021: 71 ML/MIN/1.73M2
EOSINOPHIL # BLD MANUAL: 0 10E3/UL (ref 0–0.7)
EOSINOPHIL NFR BLD MANUAL: 0 %
ERYTHROCYTE [DISTWIDTH] IN BLOOD BY AUTOMATED COUNT: 15.6 % (ref 10–15)
FLUAV RNA SPEC QL NAA+PROBE: NEGATIVE
FLUBV RNA RESP QL NAA+PROBE: NEGATIVE
GLUCOSE SERPL-MCNC: 139 MG/DL (ref 70–99)
GLUCOSE UR STRIP-MCNC: NEGATIVE MG/DL
GLUCOSE UR STRIP-MCNC: NEGATIVE MG/DL
HCO3 SERPL-SCNC: 22 MMOL/L (ref 22–29)
HCT VFR BLD AUTO: 38.2 % (ref 40–53)
HGB BLD-MCNC: 12.6 G/DL (ref 13.3–17.7)
HGB UR QL STRIP: ABNORMAL
HGB UR QL STRIP: ABNORMAL
HOLD SPECIMEN: NORMAL
HOLD SPECIMEN: NORMAL
KETONES UR STRIP-MCNC: 10 MG/DL
KETONES UR STRIP-MCNC: 15 MG/DL
LACTATE SERPL-SCNC: 1.3 MMOL/L (ref 0.7–2)
LEUKOCYTE ESTERASE UR QL STRIP: ABNORMAL
LEUKOCYTE ESTERASE UR QL STRIP: ABNORMAL
LYMPHOCYTES # BLD MANUAL: 0 10E3/UL (ref 0.8–5.3)
LYMPHOCYTES NFR BLD MANUAL: 0 %
MCH RBC QN AUTO: 24.5 PG (ref 26.5–33)
MCHC RBC AUTO-ENTMCNC: 33 G/DL (ref 31.5–36.5)
MCV RBC AUTO: 74 FL (ref 78–100)
MONOCYTES # BLD MANUAL: 2.3 10E3/UL (ref 0–1.3)
MONOCYTES NFR BLD MANUAL: 9 %
MUCOUS THREADS #/AREA URNS LPF: PRESENT /LPF
NEUTROPHILS # BLD MANUAL: 22.8 10E3/UL (ref 1.6–8.3)
NEUTROPHILS NFR BLD MANUAL: 91 %
NITRATE UR QL: NEGATIVE
NITRATE UR QL: NEGATIVE
OSMOLALITY SERPL: 275 MMOL/KG (ref 280–301)
OSMOLALITY UR: 547 MMOL/KG (ref 100–1200)
PH UR STRIP: 5.5 [PH] (ref 5–7)
PH UR STRIP: 6 [PH] (ref 5–7)
PLAT MORPH BLD: NORMAL
PLATELET # BLD AUTO: 197 10E3/UL (ref 150–450)
POTASSIUM SERPL-SCNC: 4 MMOL/L (ref 3.4–5.3)
PROCALCITONIN SERPL IA-MCNC: 0.48 NG/ML
PROT SERPL-MCNC: 7.1 G/DL (ref 6.4–8.3)
RBC # BLD AUTO: 5.14 10E6/UL (ref 4.4–5.9)
RBC #/AREA URNS AUTO: ABNORMAL /HPF
RBC MORPH BLD: NORMAL
RBC URINE: 6 /HPF
RSV RNA SPEC NAA+PROBE: NEGATIVE
SARS-COV-2 RNA RESP QL NAA+PROBE: NEGATIVE
SODIUM SERPL-SCNC: 127 MMOL/L (ref 135–145)
SODIUM UR-SCNC: <20 MMOL/L
SP GR UR STRIP: 1.02 (ref 1–1.03)
SP GR UR STRIP: 1.02 (ref 1–1.03)
SQUAMOUS EPITHELIAL: <1 /HPF
TROPONIN T SERPL HS-MCNC: 14 NG/L
TROPONIN T SERPL HS-MCNC: 15 NG/L
UROBILINOGEN UR STRIP-ACNC: 0.2 E.U./DL
UROBILINOGEN UR STRIP-MCNC: NORMAL MG/DL
WBC # BLD AUTO: 25.1 10E3/UL (ref 4–11)
WBC #/AREA URNS AUTO: ABNORMAL /HPF
WBC URINE: 81 /HPF

## 2025-06-29 PROCEDURE — 250N000011 HC RX IP 250 OP 636: Performed by: EMERGENCY MEDICINE

## 2025-06-29 PROCEDURE — 81001 URINALYSIS AUTO W/SCOPE: CPT | Performed by: EMERGENCY MEDICINE

## 2025-06-29 PROCEDURE — 258N000003 HC RX IP 258 OP 636: Performed by: PHYSICIAN ASSISTANT

## 2025-06-29 PROCEDURE — 36415 COLL VENOUS BLD VENIPUNCTURE: CPT | Performed by: EMERGENCY MEDICINE

## 2025-06-29 PROCEDURE — 51798 US URINE CAPACITY MEASURE: CPT

## 2025-06-29 PROCEDURE — 84300 ASSAY OF URINE SODIUM: CPT | Performed by: PHYSICIAN ASSISTANT

## 2025-06-29 PROCEDURE — 82310 ASSAY OF CALCIUM: CPT | Performed by: EMERGENCY MEDICINE

## 2025-06-29 PROCEDURE — 120N000001 HC R&B MED SURG/OB

## 2025-06-29 PROCEDURE — 85027 COMPLETE CBC AUTOMATED: CPT | Performed by: EMERGENCY MEDICINE

## 2025-06-29 PROCEDURE — 3078F DIAST BP <80 MM HG: CPT | Performed by: FAMILY MEDICINE

## 2025-06-29 PROCEDURE — 83605 ASSAY OF LACTIC ACID: CPT | Performed by: EMERGENCY MEDICINE

## 2025-06-29 PROCEDURE — 83935 ASSAY OF URINE OSMOLALITY: CPT | Performed by: PHYSICIAN ASSISTANT

## 2025-06-29 PROCEDURE — 99285 EMERGENCY DEPT VISIT HI MDM: CPT | Mod: 25

## 2025-06-29 PROCEDURE — 250N000009 HC RX 250: Performed by: EMERGENCY MEDICINE

## 2025-06-29 PROCEDURE — 87086 URINE CULTURE/COLONY COUNT: CPT | Performed by: EMERGENCY MEDICINE

## 2025-06-29 PROCEDURE — 99214 OFFICE O/P EST MOD 30 MIN: CPT | Performed by: FAMILY MEDICINE

## 2025-06-29 PROCEDURE — 258N000003 HC RX IP 258 OP 636: Performed by: EMERGENCY MEDICINE

## 2025-06-29 PROCEDURE — 250N000013 HC RX MED GY IP 250 OP 250 PS 637: Performed by: PHYSICIAN ASSISTANT

## 2025-06-29 PROCEDURE — 85007 BL SMEAR W/DIFF WBC COUNT: CPT | Performed by: EMERGENCY MEDICINE

## 2025-06-29 PROCEDURE — 85014 HEMATOCRIT: CPT | Performed by: EMERGENCY MEDICINE

## 2025-06-29 PROCEDURE — 81001 URINALYSIS AUTO W/SCOPE: CPT | Performed by: FAMILY MEDICINE

## 2025-06-29 PROCEDURE — 87040 BLOOD CULTURE FOR BACTERIA: CPT | Performed by: EMERGENCY MEDICINE

## 2025-06-29 PROCEDURE — 84484 ASSAY OF TROPONIN QUANT: CPT | Performed by: EMERGENCY MEDICINE

## 2025-06-29 PROCEDURE — 71046 X-RAY EXAM CHEST 2 VIEWS: CPT

## 2025-06-29 PROCEDURE — 3077F SYST BP >= 140 MM HG: CPT | Performed by: FAMILY MEDICINE

## 2025-06-29 PROCEDURE — 93005 ELECTROCARDIOGRAM TRACING: CPT

## 2025-06-29 PROCEDURE — 74177 CT ABD & PELVIS W/CONTRAST: CPT

## 2025-06-29 PROCEDURE — 96365 THER/PROPH/DIAG IV INF INIT: CPT | Mod: 59

## 2025-06-29 PROCEDURE — 99223 1ST HOSP IP/OBS HIGH 75: CPT | Performed by: PHYSICIAN ASSISTANT

## 2025-06-29 PROCEDURE — 83930 ASSAY OF BLOOD OSMOLALITY: CPT | Performed by: PHYSICIAN ASSISTANT

## 2025-06-29 PROCEDURE — 84145 PROCALCITONIN (PCT): CPT | Performed by: EMERGENCY MEDICINE

## 2025-06-29 PROCEDURE — 87637 SARSCOV2&INF A&B&RSV AMP PRB: CPT | Performed by: EMERGENCY MEDICINE

## 2025-06-29 RX ORDER — IOPAMIDOL 755 MG/ML
62 INJECTION, SOLUTION INTRAVASCULAR ONCE
Status: COMPLETED | OUTPATIENT
Start: 2025-06-29 | End: 2025-06-29

## 2025-06-29 RX ORDER — CALCIUM CARBONATE 500 MG/1
1000 TABLET, CHEWABLE ORAL 4 TIMES DAILY PRN
Status: DISCONTINUED | OUTPATIENT
Start: 2025-06-29 | End: 2025-07-02 | Stop reason: HOSPADM

## 2025-06-29 RX ORDER — TAMSULOSIN HYDROCHLORIDE 0.4 MG/1
0.4 CAPSULE ORAL DAILY
Status: DISCONTINUED | OUTPATIENT
Start: 2025-06-29 | End: 2025-07-02 | Stop reason: HOSPADM

## 2025-06-29 RX ORDER — CEFTRIAXONE 1 G/1
1 INJECTION, POWDER, FOR SOLUTION INTRAMUSCULAR; INTRAVENOUS EVERY 24 HOURS
Status: DISCONTINUED | OUTPATIENT
Start: 2025-06-30 | End: 2025-07-01

## 2025-06-29 RX ORDER — MULTIVITAMIN
1 TABLET ORAL DAILY
COMMUNITY

## 2025-06-29 RX ORDER — ACETAMINOPHEN 325 MG/1
650 TABLET ORAL EVERY 4 HOURS PRN
Status: DISCONTINUED | OUTPATIENT
Start: 2025-06-29 | End: 2025-06-30

## 2025-06-29 RX ORDER — ACETAMINOPHEN 325 MG/1
650 TABLET ORAL EVERY 6 HOURS PRN
Status: DISCONTINUED | OUTPATIENT
Start: 2025-06-29 | End: 2025-06-29

## 2025-06-29 RX ORDER — LIDOCAINE 40 MG/G
CREAM TOPICAL
Status: DISCONTINUED | OUTPATIENT
Start: 2025-06-29 | End: 2025-07-02 | Stop reason: HOSPADM

## 2025-06-29 RX ORDER — ACETAMINOPHEN 650 MG/1
650 SUPPOSITORY RECTAL EVERY 4 HOURS PRN
Status: DISCONTINUED | OUTPATIENT
Start: 2025-06-29 | End: 2025-06-30

## 2025-06-29 RX ORDER — LOSARTAN POTASSIUM 25 MG/1
25 TABLET ORAL DAILY
Status: DISCONTINUED | OUTPATIENT
Start: 2025-06-29 | End: 2025-07-01

## 2025-06-29 RX ORDER — CEFTRIAXONE 1 G/1
1 INJECTION, POWDER, FOR SOLUTION INTRAMUSCULAR; INTRAVENOUS ONCE
Status: COMPLETED | OUTPATIENT
Start: 2025-06-29 | End: 2025-06-29

## 2025-06-29 RX ADMIN — CEFTRIAXONE 1 G: 1 INJECTION, POWDER, FOR SOLUTION INTRAMUSCULAR; INTRAVENOUS at 12:01

## 2025-06-29 RX ADMIN — IOPAMIDOL 62 ML: 755 INJECTION, SOLUTION INTRAVENOUS at 13:06

## 2025-06-29 RX ADMIN — SODIUM CHLORIDE 56 ML: 9 INJECTION, SOLUTION INTRAVENOUS at 13:08

## 2025-06-29 RX ADMIN — SODIUM CHLORIDE, PRESERVATIVE FREE 1000 ML: 5 INJECTION INTRAVENOUS at 16:21

## 2025-06-29 RX ADMIN — ACETAMINOPHEN 650 MG: 325 TABLET ORAL at 16:21

## 2025-06-29 RX ADMIN — SODIUM CHLORIDE 1000 ML: 0.9 INJECTION, SOLUTION INTRAVENOUS at 11:47

## 2025-06-29 RX ADMIN — TAMSULOSIN HYDROCHLORIDE 0.4 MG: 0.4 CAPSULE ORAL at 16:21

## 2025-06-29 ASSESSMENT — ACTIVITIES OF DAILY LIVING (ADL)
ADLS_ACUITY_SCORE: 23
ADLS_ACUITY_SCORE: 41
ADLS_ACUITY_SCORE: 23
ADLS_ACUITY_SCORE: 23
ADLS_ACUITY_SCORE: 41
ADLS_ACUITY_SCORE: 23
ADLS_ACUITY_SCORE: 41
ADLS_ACUITY_SCORE: 41
ADLS_ACUITY_SCORE: 23
ADLS_ACUITY_SCORE: 41
ADLS_ACUITY_SCORE: 23
ADLS_ACUITY_SCORE: 23

## 2025-06-29 ASSESSMENT — COLUMBIA-SUICIDE SEVERITY RATING SCALE - C-SSRS
6. HAVE YOU EVER DONE ANYTHING, STARTED TO DO ANYTHING, OR PREPARED TO DO ANYTHING TO END YOUR LIFE?: NO
1. IN THE PAST MONTH, HAVE YOU WISHED YOU WERE DEAD OR WISHED YOU COULD GO TO SLEEP AND NOT WAKE UP?: NO
2. HAVE YOU ACTUALLY HAD ANY THOUGHTS OF KILLING YOURSELF IN THE PAST MONTH?: NO

## 2025-06-29 NOTE — ED TRIAGE NOTES
Patient presents to ED with complaints of a fever and weakness starting yesterday. Reports pain and difficulty urinating. ABCs intact

## 2025-06-29 NOTE — PROGRESS NOTES
The history of present illness was obtained through his daughter and through the Cantonese- on the iPad machine..      SUBJECTIVE:   Josh Gonzalez is a 79 year old male with a history of Diabetes Mellitus type 2, gait instability..  He is presenting with a chief complaint of pain with urination for the past two days.  In addition, patient has felt dizzy (lightheadedness that comes and goes, worsened by the hiccup episodes, by the lack of sleep, the urinary pain ) and has had leg weakness (both legs, at sides of the legs, at the calves) and more instability since yesterday.    No falls.    There have been fevers today  No vomiting/nausea  No blood in the urine  There has been  urinary frequency   There has been left lower back pain for the past few months.      Patient also complains of frequent hiccups since yesterday. The hiccups have interfered with his sleep.  Patient has tried Albuterol inhaler which helped with some of the hiccups.  The hiccups improved this morning.      Past Medical History:   Diagnosis Date    Depression     Depressive disorder     Mixed incontinence 12/20/2016    Myalgia     KULWANT on CPAP     Postnasal drip     ROTATOR CUFF (CAPSULE) SPRAIN    Diabetes Mellitus Type 2.    Current Outpatient Medications   Medication Sig Dispense Refill    calcium carbonate 500 mg-vitamin D 200 units (OSCAL WITH D;OYSTER SHELL CALCIUM) 500-200 MG-UNIT per tablet Take 2 tablets by mouth daily      losartan (COZAAR) 25 MG tablet Take 1 tablet (25 mg) by mouth daily. 90 tablet 3    naproxen (NAPROSYN) 500 MG tablet TAKE 1 TABLET (500 MG) BY MOUTH 2 TIMES DAILY AS NEEDED FOR MODERATE PAIN 30 tablet 2    sertraline (ZOLOFT) 50 MG tablet Take 1 tablet (50 mg) by mouth daily. 90 tablet 3    Vitamin D3 (CHOLECALCIFEROL) 25 mcg (1000 units) tablet TAKE ONE TABLET BY MOUTH ONE TIME DAILY 90 tablet 0    acetaminophen (TYLENOL) 325 MG tablet Take 2 tablets (650 mg) by mouth every 6 hours as needed for  "mild pain 100 tablet 3    diphenhydrAMINE (BENADRYL) 25 MG tablet Take 1 tablet (25 mg) by mouth At Bedtime 30 tablet 11    ORDER FOR DME Equipment being ordered: CPAP supplies (mask, tubing etc.) 1 Device 5     Social History     Tobacco Use    Smoking status: Former     Current packs/day: 0.00     Average packs/day: 2.0 packs/day for 41.6 years (83.2 ttl pk-yrs)     Types: Cigarettes     Start date: 2/3/1960     Quit date: 2001     Years since quittin.8    Smokeless tobacco: Never   Substance Use Topics    Alcohol use: No     Alcohol/week: 0.0 standard drinks of alcohol       ROS:  CONSTITUTIONAL:positive for fevers.    NEURO:  positive for dizziness (lightheadedness).    :  positive for dysuria, urinary frequency  RESP:  positive for hiccups    OBJECTIVE:  BP (!) 146/69 (BP Location: Right arm, Patient Position: Sitting, Cuff Size: Adult Regular)   Pulse 88   Temp 100.6  F (38.1  C) (Oral)   Resp 16   Ht 1.626 m (5' 4\")   Wt 56.2 kg (124 lb)   SpO2 96%   BMI 21.28 kg/m    GENERAL APPEARANCE: healthy, alert and no distress    LAB:    Results for orders placed or performed in visit on 25   UA Macroscopic with reflex to Microscopic and Culture - Clinic Collect     Status: Abnormal    Specimen: Urine, Midstream   Result Value Ref Range    Color Urine Yellow Colorless, Straw, Light Yellow, Yellow    Appearance Urine Slightly Cloudy (A) Clear    Glucose Urine Negative Negative mg/dL    Bilirubin Urine Negative Negative    Ketones Urine 15 (A) Negative mg/dL    Specific Gravity Urine 1.025 1.003 - 1.035    Blood Urine Small (A) Negative    pH Urine 5.5 5.0 - 7.0    Protein Albumin Urine >=300 (A) Negative mg/dL    Urobilinogen Urine 0.2 0.2, 1.0 E.U./dL    Nitrite Urine Negative Negative    Leukocyte Esterase Urine Small (A) Negative   UA Microscopic with Reflex to Culture     Status: Abnormal   Result Value Ref Range    Bacteria Urine Few (A) None Seen /HPF    RBC Urine 0-2 0-2 /HPF /HPF    WBC " Urine 5-10 (A) 0-5 /HPF /HPF    Narrative    Urine Culture not indicated         ASSESSMENT:  Dysuria  Acute Cystitis without hematuria  Fever in Adult.    Possible presence of urosepsis given the presence of fevers, lightheadedness, fevers.      History of Diabetes mellitus type 2    Hiccups, now resolved.      PLAN:  For the Fever, lightheadedness and for the cystitis:  Given the concern for possible urosepsis, patient will go via private vehicle (accompanied by his wife and daughter) to the [facility] emergency room for further evaluation and treatment.  I gave report to the emergency room nurse today at around 10:25 am.      For future hiccups, here are some techniques to try to reduce the hiccups:  1)  Hold your breath for 5-10 seconds  2) Pretend you're defecating.  Hold this sensation for 5-10 seconds.   3)  Sip very cold water  4) Pull on your tongue  5) Bite into a lemon.     Jony Hernández MD

## 2025-06-29 NOTE — TELEPHONE ENCOUNTER
Patient wife gave verbal consent to speak with mp who is patients grandson.          Nurse Triage SBAR    Is this a 2nd Level Triage? NO    Situation:  urinary concerns and hiccups     Background:   Patient wife calls with use of  with concerns regarding. Difficulty and painful urination. Frequency and difficulty urinating and consistent hiccups. Denies shortness of breath or difficulty breathing    Assessment:    Difficulty and painful urination. Frequency and difficulty urinating and consistent hiccups. Denies shortness of breath or difficulty breathing    Protocol Recommended Disposition:   See HCP Within 4 Hours (Or PCP Triage)    Recommendation:  care advice given          Does the patient meet one of the following criteria for ADS visit consideration? 16+ years old, with an [facility] PCP     TIP  Providers, please consider if this condition is appropriate for management at one of our Acute and Diagnostic Services sites.     If patient is a good candidate, please use dotphrase <dot>triageresponse and select Refer to ADS to document.    Reason for Disposition   Diabetes mellitus or weak immune system (e.g., HIV positive, cancer chemo, splenectomy, organ transplant, chronic steroids)    Additional Information   Negative: Shock suspected (e.g., cold/pale/clammy skin, too weak to stand, low BP, rapid pulse)   Negative: Sounds like a life-threatening emergency to the triager   Negative: Followed a genital area injury (e.g., penis, scrotum)   Negative: Taking antibiotic for urinary tract infection (UTI)   Negative: Pain in scrotum is main symptom   Negative: [1] Unable to urinate (or only a few drops) > 4 hours AND [2] bladder feels very full (e.g., feels blocked with strong urge to urinate; palpable bladder)   Negative: Swollen scrotum   Negative: [1] Constant pain in scrotum or testicle AND [2] present > 1 hour   Negative: Vomiting   Negative: Patient sounds very sick or weak to the triager   Negative: [1]  SEVERE pain with urination (e.g., excruciating) AND [2] not improved after 2 hours of pain medicine (e.g., acetaminophen or ibuprofen)   Negative: Fever > 100.4 F (38.0 C)   Negative: Side (flank) or lower back pain present    Protocols used: Urination Pain - Male-A-AH

## 2025-06-29 NOTE — ED PROVIDER NOTES
"  Emergency Department Note      History of Present Illness     Chief Complaint   Fever      HPI   A professional Cantonese  was used to provide the history.   Josh Gonzalez is a 79 year old male  who presents via car from home accompanied by partner with chief complaint of fever. The patient's wife reports fever with associated episodes of dysuria, urinary frequency, and difficulty sleeping ongoing for the past couple of days. He also has been experiencing urinary frequency, needing to urinate every hour. His wife notes the patient has been coughing and has been taking his inhaler. He endorses back pain and abdominal pain with associated loss of appetite. Last night (6/28/25) he had hiccups and shortness of breath. No vomiting or diarrhea. No chest pain.       Independent Historian   Wife and professional Cantonese language interpretor as detailed above.    Review of External Notes   I reviewed the urgent care visit from earlier today.  Patient had a UA concerning for UTI and was sent to the ED for concerns about possible urosepsis.    Past Medical History     Medical History and Problem List   Major depressive disorder  Mixed incontinence  Mylagia  Obstructive sleep apnea   Postnasal drip  Rotator cuff sprain    Medications   Losartan  Naproxen  Sertraline    Physical Exam     Patient Vitals for the past 24 hrs:   BP Temp Temp src Pulse Resp SpO2 Height   06/29/25 1400 137/71 -- -- 82 20 97 % --   06/29/25 1345 139/62 -- -- 82 22 96 % --   06/29/25 1339 -- 98.6  F (37  C) Oral 84 20 97 % --   06/29/25 1300 (!) 141/67 -- -- 80 20 99 % --   06/29/25 1200 135/61 -- -- -- -- 100 % --   06/29/25 1116 -- -- -- -- -- -- 1.7 m (5' 6.93\")   06/29/25 1113 138/80 100.7  F (38.2  C) Temporal 84 18 97 % --     Physical Exam  Vitals and nursing note reviewed.   Constitutional:       General: He is not in acute distress.     Appearance: He is ill-appearing. He is not toxic-appearing.      Comments: Elderly and frail " appearing   HENT:      Head: Normocephalic and atraumatic.      Right Ear: External ear normal.      Left Ear: External ear normal.      Nose: Nose normal.   Eyes:      Conjunctiva/sclera: Conjunctivae normal.   Cardiovascular:      Rate and Rhythm: Normal rate and regular rhythm.      Heart sounds: No murmur heard.  Pulmonary:      Effort: Pulmonary effort is normal. No respiratory distress.      Breath sounds: No wheezing, rhonchi or rales.   Abdominal:      General: Abdomen is flat. There is no distension.      Palpations: Abdomen is soft.      Tenderness: There is no abdominal tenderness. There is no right CVA tenderness, left CVA tenderness, guarding or rebound.   Musculoskeletal:         General: No swelling or deformity.      Cervical back: Normal range of motion and neck supple.   Skin:     General: Skin is warm and dry.      Findings: No rash.   Neurological:      Mental Status: He is alert and oriented to person, place, and time.   Psychiatric:         Behavior: Behavior normal.           Diagnostics     Lab Results   Labs Ordered and Resulted from Time of ED Arrival to Time of ED Departure   COMPREHENSIVE METABOLIC PANEL - Abnormal       Result Value    Sodium 127 (*)     Potassium 4.0      Carbon Dioxide (CO2) 22      Anion Gap 13      Urea Nitrogen 15.0      Creatinine 1.06      GFR Estimate 71      Calcium 8.9      Chloride 92 (*)     Glucose 139 (*)     Alkaline Phosphatase 64      AST 16      ALT 17      Protein Total 7.1      Albumin 3.7      Bilirubin Total 1.1     ROUTINE UA WITH MICROSCOPIC REFLEX TO CULTURE - Abnormal    Color Urine Yellow      Appearance Urine Clear      Glucose Urine Negative      Bilirubin Urine Negative      Ketones Urine 10 (*)     Specific Gravity Urine 1.020      Blood Urine Small (*)     pH Urine 6.0      Protein Albumin Urine 70 (*)     Urobilinogen Urine Normal      Nitrite Urine Negative      Leukocyte Esterase Urine Moderate (*)     Bacteria Urine Few (*)     Mucus  Urine Present (*)     RBC Urine 6 (*)     WBC Urine 81 (*)     Squamous Epithelials Urine <1     CBC WITH PLATELETS AND DIFFERENTIAL - Abnormal    WBC Count 25.1 (*)     RBC Count 5.14      Hemoglobin 12.6 (*)     Hematocrit 38.2 (*)     MCV 74 (*)     MCH 24.5 (*)     MCHC 33.0      RDW 15.6 (*)     Platelet Count 197     MANUAL DIFFERENTIAL - Abnormal    % Neutrophils 91      % Lymphocytes 0      % Monocytes 9      % Eosinophils 0      % Basophils 0      Absolute Neutrophils 22.8 (*)     Absolute Lymphocytes 0.0 (*)     Absolute Monocytes 2.3 (*)     Absolute Eosinophils 0.0      Absolute Basophils 0.0     LACTIC ACID WHOLE BLOOD WITH 1X REPEAT IN 2 HR WHEN >2 - Normal    Lactic Acid, Initial 1.3     TROPONIN T, HIGH SENSITIVITY - Normal    Troponin T, High Sensitivity 15     PROCALCITONIN - Normal    Procalcitonin 0.48     INFLUENZA A/B, RSV AND SARS-COV2 PCR - Normal    Influenza A PCR Negative      Influenza B PCR Negative      RSV PCR Negative      SARS CoV2 PCR Negative     TROPONIN T, HIGH SENSITIVITY - Normal    Troponin T, High Sensitivity 14     RBC AND PLATELET MORPHOLOGY    RBC Morphology Confirmed RBC Indices      Platelet Assessment        Value: Automated Count Confirmed. Platelet morphology is normal.   BLOOD CULTURE   BLOOD CULTURE   URINE CULTURE       Imaging   Abd/pelvis CT,  IV  contrast only TRAUMA / AAA   Final Result   IMPRESSION:       1.  Limited, motion degraded exam. Within this limitation, no acute abnormality or cause of sepsis are identified.      2.  Mild diffuse bladder wall thickening, probably reflecting sequela of chronic bladder outlet obstruction in the setting of prostatomegaly. Correlate with urinalysis to exclude cystitis.      3.  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      4.  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      XR Chest 2 Views   Final Result   IMPRESSION:  Negative chest.          EKG   ECG results from 06/29/25   EKG 12-lead, tracing only     Value    Systolic Blood Pressure     Diastolic Blood Pressure     Ventricular Rate 84    Atrial Rate 84    CA Interval 154    QRS Duration 90        QTc 420    P Axis 71    R AXIS 60    T Axis 71    Interpretation ECG      Sinus rhythm  Possible Left atrial enlargement  Septal infarct , age undetermined  Abnormal ECG  No significant change as compared to prior, dated 06/14/24.  I reviewed at 1205          Independent Interpretation   CXR: No pneumothorax or infiltrate.    ED Course      Medications Administered   Medications   sodium chloride 0.9% BOLUS 1,000 mL (0 mLs Intravenous Stopped 6/29/25 1248)   cefTRIAXone (ROCEPHIN) 1 g vial to attach to  mL bag for ADULTS or NS 50 mL bag for PEDS (0 g Intravenous Stopped 6/29/25 1259)   iopamidol (ISOVUE-370) solution 62 mL (62 mLs Intravenous $Given 6/29/25 1306)   sodium chloride 0.9 % bag for CT scan flush (56 mLs Intravenous $Given 6/29/25 1308)       Procedures   Procedures     Discussion of Management   Admitting Hospitalist, Jessy Avilez PA-C for Dr. Andrade    ED Course   ED Course as of 06/29/25 1518   Sun Jun 29, 2025   1120 I obtained history and examined the patient as noted above.    1411 I rechecked and updated the patient.    1434 I spoke with Jessy CROWDER for Dr. Andrade, hospitalist, who accepted the patient for admission.        Additional Documentation  None    Medical Decision Making / Diagnosis     CMS Diagnoses: The patient has signs of sepsis  Sepsis ED evaluation   The patient has signs of sepsis as evidenced by:  1. Presence of 2 SIRS criteria, suspected infection, AND  2. Organ dysfunction: Sepsis work up in progress. Will continue to monitor for signs of organ dysfunction    Sepsis Care Initiation: Starting at 1130 AM on 06/29/25, until specified. Prior to this documentation, sepsis, severe sepsis, or septic shock was NOT thought to be a  significant cause of illness. This order represents the first time infection was seriously considered to be affecting the patient.    Lactic Acid Results:  Recent Labs   Lab Test 06/29/25  1144   LACT 1.3       3 Hour Bundle 6 Hour Bundle (Reassessment)   Blood Cultures before IV Antibiotics: Yes  Antibiotics given: see below  Prehospital fluid volume (mL):                     Total fluids given (ED +Pre-hospital):  The patient responded to a lesser volume of IV fluids. The initial volume ordered was 1000 mL.    Repeat Lactic Acid Level: Ordered by reflex for 2 hours after initial lactic acid collection.  Vasopressors: MAP>65 after initial IVF bolus, will continue to monitor fluid status and vital signs.  Repeat perfusion exam: I attest to having performed a repeat sepsis exam and assessment of perfusion at 3:20 PM .   BMI Readings from Last 1 Encounters:   06/29/25 19.46 kg/m        Anti-infectives (From admission through now)      Start     Dose/Rate Route Frequency Ordered Stop    06/29/25 1135  cefTRIAXone (ROCEPHIN) 1 g vial to attach to  mL bag for ADULTS or NS 50 mL bag for PEDS         1 g  over 15-30 Minutes Intravenous ONCE 06/29/25 1132 06/29/25 1259                MIPS   None               [facility]   Man Yamilet Gonzalez is a 79 year old male who presents with fever and fatigue.  He had a urinalysis performed at urgent care today due to urinary frequency and urgency and was found to have signs of UTI.  He appears slightly ill in the ED but vital signs are normal.  Workup here is concerning for urosepsis.  He has a clear UTI on his urinalysis and significant leukocytosis.  Fortunately his lactic acid is normal and he is not hypotensive.  Chest x-ray shows no signs of pneumonia.  A CT of the abdomen pelvis was performed and shows no other obvious source of infection and no obstructing kidney stone.  He did have incidental findings of a iliac artery aneurysm and occlusion, but these appear to be chronic as he  already has collateral flow.  Given his age, weakness, fever and signs of systemic illness with associated likely urosepsis, we will plan to admit for IV antibiotics.  I discussed with the hospitalist who accepts admission.  We did perform a postvoid bladder scan and the patient seems to have some slight urinary retention, likely due to BPH.  We will hold off on placing a Land for now and defer to inpatient team.    Disposition   The patient was admitted to the hospital.     Diagnosis     ICD-10-CM    1. Sepsis due to urinary tract infection (H)  A41.9     N39.0       2. Iliac artery aneurysm  I72.3       3. 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        Marky Moody MD  06/29/25 [id number]
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
## 2025-06-29 NOTE — PATIENT INSTRUCTIONS
Go to the emergency room of [facility] for further evaluation for the fevers and for the urinary tract infection.      For future hiccups, here are some techniques to try to reduce the hiccups:  1)  Hold your breath for 5-10 seconds  2) Pretend you're defecating.  Hold this sensation for 5-10 seconds.   3)  Sip very cold water  4) Pull on your tongue  5) Bite into a lemon.

## 2025-06-29 NOTE — PROGRESS NOTES
Urgent Care Clinic Visit    Chief Complaint   Patient presents with    Urgent Care     Hiccups , not being able to sleep  x yesterday, dysuria x 2 days now, as well as poor appetite and dizziness x yesterday morning. Feels weak this morning can't walk properly. Pt used his wife's ihaler this morning and is feeling   sightly better.               6/29/2025     9:43 AM   Additional Questions   Roomed by Guevara becker   Accompanied by Daughter and wife     Pre-Provider Visit Orders- Urinalysis UA/UC  Patient reports the following symptoms:  discomfort, pain or burning with urination   Does the patient report any of the following symptoms: has a urinary catheter in place, or unable to void in a specimen cup?  No

## 2025-06-29 NOTE — ED NOTES
[facility]  ED Nurse Handoff Report    ED Chief complaint: Fever  . ED Diagnosis:   Final diagnoses:   Sepsis due to urinary tract infection (H)   Iliac artery aneurysm   Iliac artery occlusion (H)       Allergies: No Known Allergies    Code Status: Full Code    Activity level - Baseline/Home:  cane.  Activity Level - Current:   standby.   Lift room needed: No.   Bariatric: No   Needed: Yes - cantonese  Isolation: No.   Infection: Not Applicable.     Respiratory status: Room air    Vital Signs (within 30 minutes):   Vitals:    06/29/25 1300 06/29/25 1339 06/29/25 1345 06/29/25 1400   BP: (!) 141/67  139/62 137/71   Pulse: 80 84 82 82   Resp: 20 20 22 20   Temp:  98.6  F (37  C)     TempSrc:  Oral     SpO2: 99% 97% 96% 97%   Height:           Cardiac Rhythm:  ,      Pain level:    Patient confused: No.   Patient Falls Risk: nonskid shoes/slippers when out of bed, patient and family education, activity supervised, and toileting schedule implemented.   Elimination Status: Has voided     Patient Report - Initial Complaint: A professional e[employer] interpretor was used to provide the history.   Josh Gonzalez is a 79 year old male  who presents via car from home accompanied by partner with chief complaint of fever. The patient's wife reports fever with associated episodes of dysuria, urinary frequency, and difficulty sleeping ongoing for the past couple of days. He also has been experiencing urinary frequency, needing to urinate every hour. His wife notes the patient has been coughing and has been taking his inhaler. He endorses back pain and abdominal pain with associated loss of appetite. Last night (6/28/25) he had hiccups and shortness of breath. No vomiting or diarrhea. No chest pain.   Focused Assessment: . Strict intake/output, pain control.    Abnormal Results:   Labs Ordered and Resulted from Time of ED Arrival to Time of ED Departure   COMPREHENSIVE METABOLIC PANEL - Abnormal        Result Value    Sodium 127 (*)     Potassium 4.0      Carbon Dioxide (CO2) 22      Anion Gap 13      Urea Nitrogen 15.0      Creatinine 1.06      GFR Estimate 71      Calcium 8.9      Chloride 92 (*)     Glucose 139 (*)     Alkaline Phosphatase 64      AST 16      ALT 17      Protein Total 7.1      Albumin 3.7      Bilirubin Total 1.1     ROUTINE UA WITH MICROSCOPIC REFLEX TO CULTURE - Abnormal    Color Urine Yellow      Appearance Urine Clear      Glucose Urine Negative      Bilirubin Urine Negative      Ketones Urine 10 (*)     Specific Gravity Urine 1.020      Blood Urine Small (*)     pH Urine 6.0      Protein Albumin Urine 70 (*)     Urobilinogen Urine Normal      Nitrite Urine Negative      Leukocyte Esterase Urine Moderate (*)     Bacteria Urine Few (*)     Mucus Urine Present (*)     RBC Urine 6 (*)     WBC Urine 81 (*)     Squamous Epithelials Urine <1     CBC WITH PLATELETS AND DIFFERENTIAL - Abnormal    WBC Count 25.1 (*)     RBC Count 5.14      Hemoglobin 12.6 (*)     Hematocrit 38.2 (*)     MCV 74 (*)     MCH 24.5 (*)     MCHC 33.0      RDW 15.6 (*)     Platelet Count 197     MANUAL DIFFERENTIAL - Abnormal    % Neutrophils 91      % Lymphocytes 0      % Monocytes 9      % Eosinophils 0      % Basophils 0      Absolute Neutrophils 22.8 (*)     Absolute Lymphocytes 0.0 (*)     Absolute Monocytes 2.3 (*)     Absolute Eosinophils 0.0      Absolute Basophils 0.0     LACTIC ACID WHOLE BLOOD WITH 1X REPEAT IN 2 HR WHEN >2 - Normal    Lactic Acid, Initial 1.3     TROPONIN T, HIGH SENSITIVITY - Normal    Troponin T, High Sensitivity 15     PROCALCITONIN - Normal    Procalcitonin 0.48     INFLUENZA A/B, RSV AND SARS-COV2 PCR - Normal    Influenza A PCR Negative      Influenza B PCR Negative      RSV PCR Negative      SARS CoV2 PCR Negative     TROPONIN T, HIGH SENSITIVITY - Normal    Troponin T, High Sensitivity 14     RBC AND PLATELET MORPHOLOGY    RBC Morphology Confirmed RBC Indices      Platelet Assessment         Value: Automated Count Confirmed. Platelet morphology is normal.   BLOOD CULTURE   BLOOD CULTURE   URINE CULTURE        Abd/pelvis CT,  IV  contrast only TRAUMA / AAA   Final Result   IMPRESSION:       1.  Limited, motion degraded exam. Within this limitation, no acute abnormality or cause of sepsis are identified.      2.  Mild diffuse bladder wall thickening, probably reflecting sequela of chronic bladder outlet obstruction in the setting of prostatomegaly. Correlate with urinalysis to exclude cystitis.      3.  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      4.  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      XR Chest 2 Views   Final Result   IMPRESSION: Negative chest.          Treatments provided: 1L NS, ceftriaxone 1 g  Family Comments: Wife and daughter at bedside  OBS brochure/video discussed/provided to patient:  N/A  ED Medications:   Medications   sodium chloride 0.9% BOLUS 1,000 mL (0 mLs Intravenous Stopped 6/29/25 1248)   cefTRIAXone (ROCEPHIN) 1 g vial to attach to  mL bag for ADULTS or NS 50 mL bag for PEDS (0 g Intravenous Stopped 6/29/25 1259)   iopamidol (ISOVUE-370) solution 62 mL (62 mLs Intravenous $Given 6/29/25 1306)   sodium chloride 0.9 % bag for CT scan flush (56 mLs Intravenous $Given 6/29/25 1308)       Drips infusing:  No  For the majority of the shift this patient was Green.   Interventions performed were N/a.    Sepsis treatment initiated: No    Cares/treatment/interventions/medications to be completed following ED care: IV abx    ED Nurse Name: Sangita Osorio RN  2:44 PM

## 2025-06-29 NOTE — PHARMACY-ADMISSION MEDICATION HISTORY
Pharmacy Intern Admission Medication History    Admission medication history is complete. The information provided in this note is only as accurate as the sources available at the time of the update.    Information Source(s): Family member and CareEverywhere/SureScripts via in-person    Pertinent Information: None    Changes made to PTA medication list:  Added: multivitamin  Deleted: benadryl, naproxen, Ca+Vit D  Changed: None    Allergies reviewed with patient and updates made in EHR: yes    Medication History Completed By: Flo Pruett 6/29/2025 1:13 PM    PTA Med List   Medication Sig Last Dose/Taking    acetaminophen (TYLENOL) 325 MG tablet Take 2 tablets (650 mg) by mouth every 6 hours as needed for mild pain Taking As Needed    losartan (COZAAR) 25 MG tablet Take 1 tablet (25 mg) by mouth daily. 6/28/2025 Morning    multivitamin w/minerals (MULTI-VITAMIN) tablet Take 1 tablet by mouth daily. 6/28/2025 Morning    sertraline (ZOLOFT) 50 MG tablet Take 1 tablet (50 mg) by mouth daily. 6/28/2025 Morning    Vitamin D3 (CHOLECALCIFEROL) 25 mcg (1000 units) tablet TAKE ONE TABLET BY MOUTH ONE TIME DAILY 6/28/2025 Morning

## 2025-06-30 ENCOUNTER — APPOINTMENT (OUTPATIENT)
Dept: CT IMAGING | Facility: CLINIC | Age: 80
End: 2025-06-30
Attending: INTERNAL MEDICINE
Payer: COMMERCIAL

## 2025-06-30 ENCOUNTER — PATIENT OUTREACH (OUTPATIENT)
Dept: GERIATRIC MEDICINE | Facility: CLINIC | Age: 80
End: 2025-06-30

## 2025-06-30 LAB
ANION GAP SERPL CALCULATED.3IONS-SCNC: 10 MMOL/L (ref 7–15)
ATRIAL RATE - MUSE: 84 BPM
BACTERIA UR CULT: NO GROWTH
BUN SERPL-MCNC: 14.7 MG/DL (ref 8–23)
CALCIUM SERPL-MCNC: 8.1 MG/DL (ref 8.8–10.4)
CHLORIDE SERPL-SCNC: 102 MMOL/L (ref 98–107)
CREAT SERPL-MCNC: 0.96 MG/DL (ref 0.67–1.17)
DIASTOLIC BLOOD PRESSURE - MUSE: NORMAL MMHG
EGFRCR SERPLBLD CKD-EPI 2021: 80 ML/MIN/1.73M2
ERYTHROCYTE [DISTWIDTH] IN BLOOD BY AUTOMATED COUNT: 15.6 % (ref 10–15)
FERRITIN SERPL-MCNC: 579 NG/ML (ref 31–409)
GLUCOSE SERPL-MCNC: 122 MG/DL (ref 70–99)
HCO3 SERPL-SCNC: 21 MMOL/L (ref 22–29)
HCT VFR BLD AUTO: 36.8 % (ref 40–53)
HGB BLD-MCNC: 12.1 G/DL (ref 13.3–17.7)
INTERPRETATION ECG - MUSE: NORMAL
IRON BINDING CAPACITY (ROCHE): 184 UG/DL (ref 240–430)
IRON SATN MFR SERPL: 9 % (ref 15–46)
IRON SERPL-MCNC: 16 UG/DL (ref 61–157)
MCH RBC QN AUTO: 24.6 PG (ref 26.5–33)
MCHC RBC AUTO-ENTMCNC: 32.9 G/DL (ref 31.5–36.5)
MCV RBC AUTO: 75 FL (ref 78–100)
P AXIS - MUSE: 71 DEGREES
PLATELET # BLD AUTO: 196 10E3/UL (ref 150–450)
POTASSIUM SERPL-SCNC: 3.8 MMOL/L (ref 3.4–5.3)
PR INTERVAL - MUSE: 154 MS
QRS DURATION - MUSE: 90 MS
QT - MUSE: 356 MS
QTC - MUSE: 420 MS
R AXIS - MUSE: 60 DEGREES
RBC # BLD AUTO: 4.91 10E6/UL (ref 4.4–5.9)
SODIUM SERPL-SCNC: 133 MMOL/L (ref 135–145)
SYSTOLIC BLOOD PRESSURE - MUSE: NORMAL MMHG
T AXIS - MUSE: 71 DEGREES
VENTRICULAR RATE- MUSE: 84 BPM
VIT D+METAB SERPL-MCNC: 31 NG/ML (ref 20–50)
WBC # BLD AUTO: 19.8 10E3/UL (ref 4–11)

## 2025-06-30 PROCEDURE — 85014 HEMATOCRIT: CPT | Performed by: PHYSICIAN ASSISTANT

## 2025-06-30 PROCEDURE — 250N000011 HC RX IP 250 OP 636: Performed by: PHYSICIAN ASSISTANT

## 2025-06-30 PROCEDURE — 82310 ASSAY OF CALCIUM: CPT | Performed by: PHYSICIAN ASSISTANT

## 2025-06-30 PROCEDURE — 99233 SBSQ HOSP IP/OBS HIGH 50: CPT | Performed by: INTERNAL MEDICINE

## 2025-06-30 PROCEDURE — 120N000001 HC R&B MED SURG/OB

## 2025-06-30 PROCEDURE — 82728 ASSAY OF FERRITIN: CPT | Performed by: INTERNAL MEDICINE

## 2025-06-30 PROCEDURE — 250N000013 HC RX MED GY IP 250 OP 250 PS 637: Performed by: PHYSICIAN ASSISTANT

## 2025-06-30 PROCEDURE — 82306 VITAMIN D 25 HYDROXY: CPT | Performed by: INTERNAL MEDICINE

## 2025-06-30 PROCEDURE — 85048 AUTOMATED LEUKOCYTE COUNT: CPT | Performed by: PHYSICIAN ASSISTANT

## 2025-06-30 PROCEDURE — 250N000013 HC RX MED GY IP 250 OP 250 PS 637: Performed by: INTERNAL MEDICINE

## 2025-06-30 PROCEDURE — 80048 BASIC METABOLIC PNL TOTAL CA: CPT | Performed by: PHYSICIAN ASSISTANT

## 2025-06-30 PROCEDURE — 83550 IRON BINDING TEST: CPT | Performed by: INTERNAL MEDICINE

## 2025-06-30 PROCEDURE — 36415 COLL VENOUS BLD VENIPUNCTURE: CPT | Performed by: PHYSICIAN ASSISTANT

## 2025-06-30 PROCEDURE — 72131 CT LUMBAR SPINE W/O DYE: CPT

## 2025-06-30 RX ORDER — CHLORPROMAZINE HYDROCHLORIDE 10 MG/1
10 TABLET, FILM COATED ORAL 3 TIMES DAILY PRN
Status: DISCONTINUED | OUTPATIENT
Start: 2025-06-30 | End: 2025-07-02 | Stop reason: HOSPADM

## 2025-06-30 RX ORDER — AMOXICILLIN 250 MG
3 CAPSULE ORAL ONCE
Status: COMPLETED | OUTPATIENT
Start: 2025-06-30 | End: 2025-06-30

## 2025-06-30 RX ORDER — AMOXICILLIN 250 MG
2 CAPSULE ORAL
Status: DISCONTINUED | OUTPATIENT
Start: 2025-06-30 | End: 2025-07-02 | Stop reason: HOSPADM

## 2025-06-30 RX ORDER — FINASTERIDE 5 MG/1
5 TABLET, FILM COATED ORAL DAILY
Status: DISCONTINUED | OUTPATIENT
Start: 2025-06-30 | End: 2025-07-02 | Stop reason: HOSPADM

## 2025-06-30 RX ORDER — ACETAMINOPHEN 325 MG/1
975 TABLET ORAL EVERY 8 HOURS
Status: DISCONTINUED | OUTPATIENT
Start: 2025-06-30 | End: 2025-07-02 | Stop reason: HOSPADM

## 2025-06-30 RX ORDER — TIZANIDINE 2 MG/1
2 TABLET ORAL EVERY 6 HOURS PRN
Status: DISCONTINUED | OUTPATIENT
Start: 2025-06-30 | End: 2025-07-02 | Stop reason: HOSPADM

## 2025-06-30 RX ORDER — CELECOXIB 50 MG/1
50 CAPSULE ORAL 2 TIMES DAILY
Status: DISCONTINUED | OUTPATIENT
Start: 2025-06-30 | End: 2025-07-02 | Stop reason: HOSPADM

## 2025-06-30 RX ORDER — GABAPENTIN 100 MG/1
100 CAPSULE ORAL 3 TIMES DAILY
Status: DISCONTINUED | OUTPATIENT
Start: 2025-06-30 | End: 2025-07-02 | Stop reason: HOSPADM

## 2025-06-30 RX ADMIN — SENNOSIDES AND DOCUSATE SODIUM 3 TABLET: 50; 8.6 TABLET ORAL at 09:48

## 2025-06-30 RX ADMIN — CELECOXIB 50 MG: 50 CAPSULE ORAL at 22:14

## 2025-06-30 RX ADMIN — CHLORPROMAZINE HYDROCHLORIDE 10 MG: 10 TABLET, FILM COATED ORAL at 17:24

## 2025-06-30 RX ADMIN — TIZANIDINE 2 MG: 2 TABLET ORAL at 16:50

## 2025-06-30 RX ADMIN — CELECOXIB 50 MG: 50 CAPSULE ORAL at 10:42

## 2025-06-30 RX ADMIN — GABAPENTIN 100 MG: 100 CAPSULE ORAL at 16:42

## 2025-06-30 RX ADMIN — ACETAMINOPHEN 975 MG: 325 TABLET ORAL at 16:42

## 2025-06-30 RX ADMIN — CEFTRIAXONE 1 G: 1 INJECTION, POWDER, FOR SOLUTION INTRAMUSCULAR; INTRAVENOUS at 12:34

## 2025-06-30 RX ADMIN — GABAPENTIN 100 MG: 100 CAPSULE ORAL at 10:42

## 2025-06-30 RX ADMIN — FINASTERIDE 5 MG: 5 TABLET, FILM COATED ORAL at 09:48

## 2025-06-30 RX ADMIN — TAMSULOSIN HYDROCHLORIDE 0.4 MG: 0.4 CAPSULE ORAL at 09:48

## 2025-06-30 RX ADMIN — ACETAMINOPHEN 975 MG: 325 TABLET ORAL at 09:46

## 2025-06-30 RX ADMIN — GABAPENTIN 100 MG: 100 CAPSULE ORAL at 21:25

## 2025-06-30 ASSESSMENT — ACTIVITIES OF DAILY LIVING (ADL)
ADLS_ACUITY_SCORE: 30
ADLS_ACUITY_SCORE: 34
ADLS_ACUITY_SCORE: 30
DEPENDENT_IADLS:: CLEANING;COOKING;LAUNDRY;SHOPPING;MEAL PREPARATION;MONEY MANAGEMENT;MEDICATION MANAGEMENT;TRANSPORTATION
ADLS_ACUITY_SCORE: 30
ADLS_ACUITY_SCORE: 34
ADLS_ACUITY_SCORE: 34
ADLS_ACUITY_SCORE: 30
ADLS_ACUITY_SCORE: 34
ADLS_ACUITY_SCORE: 30

## 2025-06-30 NOTE — PLAN OF CARE
"[id number]  VSS on RA. Cantonese speaking. Need . Ambulate in room with canes. No fever this shift. Frequent voiding. Pain with voiding, no blood noted with voiding. C/o pain in the right hip and lower back, aching, declined medication intervention, heat applied with some improvement. Denies n/v, sob, cp this shift. Blood/urine culture pending.       Goal Outcome Evaluation:      Plan of Care Reviewed With: patient    Overall Patient Progress: improvingOverall Patient Progress: improving    Outcome Evaluation: blood/urine culture pending. Pain with voiding, no blood noted with voiding.      Problem: Adult Inpatient Plan of Care  Goal: Plan of Care Review  Description: The Plan of Care Review/Shift note should be completed every shift.  The Outcome Evaluation is a brief statement about your assessment that the patient is improving, declining, or no change.  This information will be displayed automatically on your shift  note.  Outcome: Progressing  Flowsheets (Taken 6/30/2025 0410)  Outcome Evaluation: blood/urine culture pending. Pain with voiding, no blood noted with voiding.  Plan of Care Reviewed With: patient  Overall Patient Progress: improving  Goal: Patient-Specific Goal (Individualized)  Description: You can add care plan individualizations to a care plan. Examples of Individualization might be:  \"Parent requests to be called daily at 9am for status\", \"I have a hard time hearing out of my right ear\", or \"Do not touch me to wake me up as it startles  me\".  Outcome: Progressing  Goal: Absence of Hospital-Acquired Illness or Injury  Outcome: Progressing  Intervention: Identify and Manage Fall Risk  Recent Flowsheet Documentation  Taken 6/29/2025 9065 by Rad Durand RN  Safety Promotion/Fall Prevention:   safety round/check completed   room organization consistent   room near nurse's station   patient and family education   clutter free environment maintained  Intervention: Prevent Skin " Injury  Recent Flowsheet Documentation  Taken 6/29/2025 2345 by Rad Durand RN  Body Position: position changed independently  Intervention: Prevent and Manage VTE (Venous Thromboembolism) Risk  Recent Flowsheet Documentation  Taken 6/29/2025 2345 by Rad Durand RN  VTE Prevention/Management: patient refused intervention  Goal: Optimal Comfort and Wellbeing  Outcome: Progressing  Intervention: Monitor Pain and Promote Comfort  Recent Flowsheet Documentation  Taken 6/29/2025 2345 by Rad Durand RN  Pain Management Interventions:   medication offered but refused   heat applied  Intervention: Provide Person-Centered Care  Recent Flowsheet Documentation  Taken 6/29/2025 2345 by Rad Durand RN  Trust Relationship/Rapport:   care explained   choices provided   questions encouraged   questions answered  Goal: Readiness for Transition of Care  Outcome: Progressing     Problem: Delirium  Goal: Optimal Coping  Outcome: Progressing  Goal: Improved Behavioral Control  Outcome: Progressing  Intervention: Minimize Safety Risk  Recent Flowsheet Documentation  Taken 6/29/2025 2345 by Rad Durand RN  Enhanced Safety Measures:   patient/family teach back on injury risk   review medications for side effects with activity   room near unit station   family to remain at bedside  Trust Relationship/Rapport:   care explained   choices provided   questions encouraged   questions answered  Goal: Improved Attention and Thought Clarity  Outcome: Progressing  Goal: Improved Sleep  Outcome: Progressing     Problem: Infection  Goal: Absence of Infection Signs and Symptoms  Outcome: Progressing

## 2025-06-30 NOTE — PROGRESS NOTES
"[facility]    Medicine Progress Note - Hospitalist Service    Date of Admission:  6/29/2025    Assessment & Plan   Josh Gonzalez is a 79 year old male admitted on 6/29/2025 with sepsis due to suspected UTI.     He had initially presented with UC with \"chief complaint of pain with urination for the past two days.  In addition, patient has felt dizzy (lightheadedness that comes and goes, worsened by the hiccup episodes, by the lack of sleep, the urinary pain ) and has had leg weakness (both legs, at sides of the legs, at the calves) and more instability since yesterday.\"    PMH includes NIDDM, KULWANT, HTN, MDD.    In the ED Tmax 100.7, vital signs stable.  Creat 1.06, Na 127.  WBC 25 with left shift. No lactic acidosis. Procal 0.48.   UA with small blood, moderate LE, will 81 WBCs, few bacteria.  Blood culture and urine culture taken. No retention noted on PVR.  Viral PCR negative for flu, COVID, RSV  Imaging: CXR negative.  CT abdomen pelvis with mild diffuse bladder wall thickening, concerning for chronic bladder outlet obstruction in the setting of prostatomegaly and cystitis. No obstructing stones or issues with the kidneys.    EKG with sinus rhythm, no acute ST elevation.   Given Ceftriaxone, 1 L NS.    DX:  DYSURIA. Leukocytosis, LGT and MILDLY positive UA.  Minimal evidence of sepsis.    Mild diffuse bladder wall thickening \"probably reflecting sequela of chronic bladder outlet obstruction in the setting of prostatomegaly.\" Describes significant prostatism, PSA 2.7 in March 2025.   Left > Right low back pain.   Bilateral leg pain, suggestive of muscular discomfort, but consider neuropathy.  Recurrent and persistent hiccups. These often waken pt from sleep.  Dementia. Pt's wife describes long-standing cognitive/expressive/memory problems.   Hyponatremia, mild, thought due to hypovolemia.  Mild microcytic anemia. 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  MDD.   PLAN:  Empirically on Ceftriaxone 1 g IV " daily.   Started Flomax and finasteride. Will need follow up with Urology.   CT Lumbar spine is positive for severe spinal canal stenosis at L4-5.  Will ask for Neurosurgery opinion about whether the symptoms correlate with findings on CT.  If so, how to proceed.  Empirically start scheduled acetaminophen, low-dose celebrex, gabapentin 100 mg TID and prn tizanadine.   Check Iron labs and vit B12.   Gabapentin may help with hiccups, but is ordered for low back pain. If it is well-tolerated, could advance the dose. Trial Thorazine 10 mg TID prn for recurrent hiccups.          Diet: Combination Diet Regular Diet Adult    DVT Prophylaxis: Pneumatic Compression Devices  Land Catheter: Not present  Lines: None     Cardiac Monitoring: None  Code Status: Full Code      Clinically Significant Risk Factors Present on Admission         # Hyponatremia: Lowest Na = 127 mmol/L in last 2 days, will monitor as appropriate  # Hypochloremia: Lowest Cl = 92 mmol/L in last 2 days, will monitor as appropriate          # Hypertension: Home medication list includes antihypertensive(s)                      Social Drivers of Health    Depression: At risk (4/25/2024)    PHQ-2     PHQ-2 Score: 4   Tobacco Use: Medium Risk (6/29/2025)    Patient History     Smoking Tobacco Use: Former     Smokeless Tobacco Use: Never   Physical Activity: Inactive (3/25/2025)    Exercise Vital Sign     Days of Exercise per Week: 0 days     Minutes of Exercise per Session: 0 min   Social Connections: Unknown (3/25/2025)    Social Connection and Isolation Panel [NHANES]     Frequency of Communication with Friends and Family: More than three times a week     Frequency of Social Gatherings with Friends and Family: Once a week     Attends Buddhism Services: Never     Active Member of Clubs or Organizations: Yes     Attends Club or Organization Meetings: More than 4 times per year   Health Literacy: Inadequate Health Literacy (2/13/2025)     Health Literacy      Frequency of need for help with medical instructions: Always          Disposition Plan     Medically Ready for Discharge: Anticipated in 2-4 Days             Mateo Andrade MD  Hospitalist Service  [facility]  Securely message with [employer] (more info)  Text page via [email or web address] Paging/Directory   ______________________________________________________________________    Interval History   Chart reviewed, pt interviewed.    I spoke with the pt's wife through a Cantonese . However, the  states that the pt's first language is Toishanese.    Physical Exam   Vital Signs: Temp: 97.8  F (36.6  C) Temp src: Oral BP: (!) 151/64 Pulse: 81   Resp: 16 SpO2: 97 % O2 Device: None (Room air)    Weight: 125 lbs 9.6 oz    General Appearance: Alert and comfortable. I am unable ot assess his degree of understanding, as his wife talks for him.   Respiratory: CTA, normal WOB  Cardiovascular: RRR without murmur  GI: soft, NTND  Skin: no lesions.   MSK: tender over the paraspinous muscles of the low back, Left > right. No pain over the spine.  No sensory loss over the LEs by confrontation. No muscle fasciculations or apparent loss of muscular bulk.  Other: Rectal does not reveal nodularity or tenderness of the prostate. Normal rectal tone.       Medical Decision Making       60 MINUTES SPENT BY ME on the date of service doing chart review, history, exam, documentation & further activities per the note.      Data   Recent Results (from the past 24 hours)   Basic metabolic panel   Result Value Ref Range    Sodium 133 (L) 135 - 145 mmol/L    Potassium 3.8 3.4 - 5.3 mmol/L    Chloride 102 98 - 107 mmol/L    Carbon Dioxide (CO2) 21 (L) 22 - 29 mmol/L    Anion Gap 10 7 - 15 mmol/L    Urea Nitrogen 14.7 8.0 - 23.0 mg/dL    Creatinine 0.96 0.67 - 1.17 mg/dL    GFR Estimate 80 >60 mL/min/1.73m2    Calcium 8.1 (L) 8.8 - 10.4 mg/dL    Glucose 122 (H) 70 - 99 mg/dL   CBC with platelets   Result Value Ref Range     WBC Count 19.8 (H) 4.0 - 11.0 10e3/uL    RBC Count 4.91 4.40 - 5.90 10e6/uL    Hemoglobin 12.1 (L) 13.3 - 17.7 g/dL    Hematocrit 36.8 (L) 40.0 - 53.0 %    MCV 75 (L) 78 - 100 fL    MCH 24.6 (L) 26.5 - 33.0 pg    MCHC 32.9 31.5 - 36.5 g/dL    RDW 15.6 (H) 10.0 - 15.0 %    Platelet Count 196 150 - 450 10e3/uL   Vitamin D Deficiency   Result Value Ref Range    Vitamin D, Total (25-Hydroxy) 31 20 - 50 ng/mL    Narrative    Season, race, dietary intake, and treatment affect the concentration of 25-hydroxy-Vitamin D. Values may decrease during winter months and increase during summer months.    Vitamin D determination is routinely performed by an immunoassay specific for 25 hydroxyvitamin D3.  If an individual is on vitamin D2(ergocalciferol) supplementation, please specify 25 OH vitamin D2 and D3 level determination by LCMSMS test VITD23.     Iron and iron binding capacity   Result Value Ref Range    Iron 16 (L) 61 - 157 ug/dL    Iron Binding Capacity 184 (L) 240 - 430 ug/dL    Iron Sat Index 9 (L) 15 - 46 %   Ferritin   Result Value Ref Range    Ferritin 579 (H) 31 - 409 ng/mL   CT Lumbar Spine w/o Contrast    Narrative    EXAM: CT LUMBAR SPINE W/O CONTRAST  LOCATION: [facility]  DATE: 6/30/2025    INDICATION: severe LBP and radiation bilat LEs  COMPARISON: None.  TECHNIQUE: Routine CT Lumbar Spine without IV contrast. Multiplanar reformats. Dose reduction techniques were used.    FINDINGS:  Nomenclature is based on 5 lumbar type vertebral bodies. Grade 1 anterolisthesis of L4 on L5. Lumbar vertebral body heights are maintained. No evidence of acute lumbar fracture. Degenerative changes of the bilateral sacroiliac joints. Scattered   atherosclerosis of the abdominal aorta and its branches.    T12-L1: Mild loss of disc height. Broad disc bulge. Mild facet hypertrophy. Mild spinal canal stenosis. No significant neural foraminal stenosis.    L1-L2: Normal disc height. Broad disc bulge. Mild facet  hypertrophy. Mild spinal canal stenosis. Mild bilateral neural foraminal stenosis.    L2-L3: Normal disc height. Broad disc bulge. Mild facet hypertrophy. Mild to moderate spinal canal stenosis. Mild to moderate bilateral neural foraminal stenosis.    L3-L4: Mild loss of disc height. Broad disc bulge. Bilateral facet hypertrophy and thickening of ligamentum flavum. Moderate to severe spinal canal stenosis. Moderate bilateral neural foraminal stenosis.    L4-L5: Mild loss of disc height. Broad disc bulge. Bilateral facet hypertrophy and thickening of ligamentum flavum. Severe spinal canal stenosis. Moderate bilateral neural foraminal stenosis.    L5-S1: Normal disc height. Mild disc bulge. Mild facet hypertrophy. No significant spinal canal stenosis. Mild bilateral neural foraminal stenosis.      Impression    IMPRESSION:  1.  At L4-L5 there is severe spinal canal stenosis and moderate bilateral neural foraminal stenosis.  2.  At L3-L4 there is moderate to severe spinal canal stenosis and moderate bilateral neural foraminal stenosis.  3.  At L2-L3 there is mild to moderate spinal canal stenosis and mild to moderate bilateral neural foraminal stenosis.  4.  Grade 1 anterolisthesis of L4 on L5.  5.  Additional multilevel lumbar spondylosis, as detailed above.

## 2025-06-30 NOTE — CONSULTS
Care Management Initial Consult    General Information  Assessment completed with: Children, daughter, Efren  Type of CM/SW Visit: Initial Assessment    Primary Care Provider verified and updated as needed: Yes   Readmission within the last 30 days:        Reason for Consult: care coordination/care conference, discharge planning       Communication Assessment  Patient's communication style: spoken language (non-English) (daughter speaks English)    Hearing Difficulty or Deaf: no   Wear Glasses or Blind: no    Cognitive  Cognitive/Neuro/Behavioral: WDL                      Living Environment:   People in home: child(noemi), adult, spouse  wife, daughter  Current living Arrangements: house      Able to return to prior arrangements:         Family/Social Support:  Care provided by: child(noemi), spouse/significant other  Provides care for: no one  Marital Status:   Support system: Wife, Children          Description of Support System: Supportive, Involved         Current Resources:   Patient receiving home care services: No        Community Resources: [employer], [employer]  Equipment currently used at home: cane, straight  Supplies currently used at home:  cane    Employment/Financial:  Employment Status: unemployed, retired        Financial Concerns: none       Does the patient's insurance plan have a 3 day qualifying hospital stay waiver?  No    Lifestyle & Psychosocial Needs:  Social Drivers of Health     Food Insecurity: Low Risk  (6/29/2025)    Food Insecurity     Within the past 12 months, did you worry that your food would run out before you got money to buy more?: No     Within the past 12 months, did the food you bought just not last and you didn t have money to get more?: No   Depression: At risk (4/25/2024)    PHQ-2     PHQ-2 Score: 4   Housing Stability: Low Risk  (6/29/2025)    Housing Stability     Do you have housing? : Yes     Are you worried about losing your housing?: No   Tobacco Use:  Medium Risk (6/29/2025)    Patient History     Smoking Tobacco Use: Former     Smokeless Tobacco Use: Never     Passive Exposure: Not on file   Financial Resource Strain: Low Risk  (6/29/2025)    Financial Resource Strain     Within the past 12 months, have you or your family members you live with been unable to get utilities (heat, electricity) when it was really needed?: No   Alcohol Use: Not At Risk (2/13/2025)    AUDIT-C     Frequency of Alcohol Consumption: Never     Average Number of Drinks: Patient does not drink     Frequency of Binge Drinking: Never   Transportation Needs: Low Risk  (6/29/2025)    Transportation Needs     Within the past 12 months, has lack of transportation kept you from medical appointments, getting your medicines, non-medical meetings or appointments, work, or from getting things that you need?: No   Physical Activity: Inactive (3/25/2025)    Exercise Vital Sign     Days of Exercise per Week: 0 days     Minutes of Exercise per Session: 0 min   Interpersonal Safety: Low Risk  (6/29/2025)    Interpersonal Safety     Do you feel physically and emotionally safe where you currently live?: Yes     Within the past 12 months, have you been hit, slapped, kicked or otherwise physically hurt by someone?: No     Within the past 12 months, have you been humiliated or emotionally abused in other ways by your partner or ex-partner?: No   Stress: No Stress Concern Present (3/25/2025)    [place] [employer] - Occupational Stress Questionnaire     Feeling of Stress : Only a little   Social Connections: Unknown (3/25/2025)    Social Connection and Isolation Panel [NHANES]     Frequency of Communication with Friends and Family: More than three times a week     Frequency of Social Gatherings with Friends and Family: Once a week     Attends Hindu Services: Never     Active Member of Clubs or Organizations: Yes     Attends Club or Organization Meetings: More than 4 times per year      Marital Status: Not on file   Health Literacy: Inadequate Health Literacy (2/13/2025)     Health Literacy     Frequency of need for help with medical instructions: Always       Functional Status:  Prior to admission patient needed assistance:   Dependent ADLs:: Ambulation-cane, Bathing, Dressing, Grooming  Dependent IADLs:: Cleaning, Cooking, Laundry, Shopping, Meal Preparation, Money Management, Medication Management, Transportation       Additional Information:  Patient admitted with sepsis, suspected UTI. CM consulted for discharge planning. Spoke with daughter, Efren. Patient lives at home with his wife and daughter. Home has 6-7 stairs to patient living area. He ambulates with a cane. He receives assistance with ADLs/IADLs. Daughter and wife does medication set up and reminders. [facility] Daughter reports patient usually sedentary with limited activity at home. Briefly discussed home care vs TCU. Daughter states family would be interested in home care services. PT consult pending.     Next Steps: monitor therapy recommendation. Follow for discharge planning.       Freddie Garrett RN Case Manager  Inpatient Care Coordination   [facility]   [phone]    Freddie Garrett RN

## 2025-06-30 NOTE — PLAN OF CARE
"Toishanese speaking (not cantonese), IV abx, lumbar CT done (see results), neurosurgery consult order placed, started several new meds (see orders), iron labs abnormal (see results), PRN thorazine given for hiccups, PRN Zanaflex given for muscle spasms, family at bedside        Goal Outcome Evaluation:    Plan of Care Reviewed With: patient, spouse  Overall Patient Progress: decliningOverall Patient Progress: declining  Outcome Evaluation: lumbar CT done, iron labs resulted, started several new meds    Problem: Adult Inpatient Plan of Care  Goal: Plan of Care Review  Description: The Plan of Care Review/Shift note should be completed every shift.  The Outcome Evaluation is a brief statement about your assessment that the patient is improving, declining, or no change.  This information will be displayed automatically on your shift  note.  Outcome: Not Progressing  Flowsheets (Taken 6/30/2025 1441)  Outcome Evaluation: lumbar CT done, iron labs resulted, started several new meds  Plan of Care Reviewed With:   patient   spouse  Overall Patient Progress: declining  Goal: Patient-Specific Goal (Individualized)  Description: You can add care plan individualizations to a care plan. Examples of Individualization might be:  \"Parent requests to be called daily at 9am for status\", \"I have a hard time hearing out of my right ear\", or \"Do not touch me to wake me up as it startles  me\".  Outcome: Not Progressing  Goal: Absence of Hospital-Acquired Illness or Injury  Outcome: Not Progressing  Intervention: Identify and Manage Fall Risk  Recent Flowsheet Documentation  Taken 6/30/2025 1000 by Marilou Alvarez, RN  Safety Promotion/Fall Prevention: safety round/check completed  Goal: Optimal Comfort and Wellbeing  Outcome: Not Progressing  Intervention: Monitor Pain and Promote Comfort  Recent Flowsheet Documentation  Taken 6/30/2025 0946 by Marilou Alvarez, RN  Pain Management Interventions: medication (see MAR)  Goal: Readiness for " Transition of Care  Outcome: Not Progressing     Problem: Delirium  Goal: Optimal Coping  Outcome: Not Progressing  Goal: Improved Behavioral Control  Outcome: Not Progressing  Intervention: Minimize Safety Risk  Recent Flowsheet Documentation  Taken 6/30/2025 1000 by Marilou Alvarez RN  Enhanced Safety Measures: family to remain at bedside  Goal: Improved Attention and Thought Clarity  Outcome: Not Progressing  Goal: Improved Sleep  Outcome: Not Progressing     Problem: Infection  Goal: Absence of Infection Signs and Symptoms  Outcome: Not Progressing

## 2025-06-30 NOTE — PLAN OF CARE
"[employer], Cantonese speaking, on RA, no tele.  PT admitted this afternoon for UTI.  Started on rocephin.  Getting one more dose of 1L NS at 100ml/hr, can discontinue once finished.  Prn tylenol given for chronic leg pain.  PT is prediabetic per MD no ACHS since fasting glucose under 150.  Blood and U/A pending.  Discharge TBD.    Goal Outcome Evaluation:      Plan of Care Reviewed With: patient    Overall Patient Progress: no changeOverall Patient Progress: no change    Outcome Evaluation: PT on NS at 100ml/hour until the bag finishes, starting on rocephin, blood/urine cultures pending      Problem: Adult Inpatient Plan of Care  Goal: Plan of Care Review  Description: The Plan of Care Review/Shift note should be completed every shift.  The Outcome Evaluation is a brief statement about your assessment that the patient is improving, declining, or no change.  This information will be displayed automatically on your shift  note.  Outcome: Progressing  Flowsheets (Taken 6/29/2025 2044)  Outcome Evaluation: PT on NS at 100ml/hour until the bag finishes, starting on rocephin, blood/urine cultures pending  Plan of Care Reviewed With: patient  Overall Patient Progress: no change  Goal: Patient-Specific Goal (Individualized)  Description: You can add care plan individualizations to a care plan. Examples of Individualization might be:  \"Parent requests to be called daily at 9am for status\", \"I have a hard time hearing out of my right ear\", or \"Do not touch me to wake me up as it startles  me\".  Outcome: Progressing  Goal: Absence of Hospital-Acquired Illness or Injury  Outcome: Progressing  Intervention: Identify and Manage Fall Risk  Recent Flowsheet Documentation  Taken 6/29/2025 1646 by Kia Kennedy RN  Safety Promotion/Fall Prevention: safety round/check completed  Intervention: Prevent Skin Injury  Recent Flowsheet Documentation  Taken 6/29/2025 1646 by Kia Kennedy RN  Body Position: position changed " independently  Intervention: Prevent and Manage VTE (Venous Thromboembolism) Risk  Recent Flowsheet Documentation  Taken 6/29/2025 1646 by Kia Kennedy RN  VTE Prevention/Management: patient refused intervention  Goal: Optimal Comfort and Wellbeing  Outcome: Progressing  Goal: Readiness for Transition of Care  Outcome: Progressing  Intervention: Mutually Develop Transition Plan  Recent Flowsheet Documentation  Taken 6/29/2025 1637 by Kia Kennedy RN  Patient/Family Anticipates Transition to: home with family  Equipment Currently Used at Home: cane, straight       Problem: Delirium  Goal: Optimal Coping  Outcome: Progressing  Goal: Improved Behavioral Control  Outcome: Progressing  Goal: Improved Attention and Thought Clarity  Outcome: Progressing  Goal: Improved Sleep  Outcome: Progressing     Problem: Infection  Goal: Absence of Infection Signs and Symptoms  Outcome: Progressing

## 2025-06-30 NOTE — PROVIDER NOTIFICATION
Dr. Andrade notified via [employer] messaging at 17:36 that pt c/o increased weakness and tiredness aprx 1h after receiving PRN zanaflex. RN noted that pt struggled to sit up on his own and support his own weight. Dr. Andrade stopped this med

## 2025-06-30 NOTE — PROGRESS NOTES
TRANSITIONS OF CARE (RYANNE) LOG    RYANNE tasks should be completed by the CC within one (1) business day of notification of each transition. Follow up contact with member is required after return to their usual care setting.  Note:  If CC finds out about the transitions fifteen (15) days or more after the member has returned to their usual care setting, no RYANNE log is needed. However, the CC should check in with the member to discuss the transition process, any changes needed to the care plan and document it in a case note.     Member Name:  Josh Gonzalez MCO Name:  [employer] Member ID#: [id number]   Product: [facility] Care Coordinator Contact:  Layla Brewer RN, PHN Agency/County/Care System: [facility]   Transition Communication Actions from Care Management Contact   Transition #1   Notification Date: 6/30/25 Transition Date:   6/29/25 Transition From: Home     Is this the member s usual care setting?               yes Transition To: [facility]   Transition Type:  Unplanned    Documentation from conversation with the member/responsible party, provider, discharging and receiving facility:   Date: 6/30/25: Received notification of admission to hospital with dx of Sepsis due to UTI  CC contacted Hospital /discharge planner,  Inpatient CC via the [facility] Hand-In Process, with community care plan included. Freddie Garrett RN Case Manager   CC reached out to adult daughter Yogi regarding transition and left a message requesting a return call.  Reviewed and update support plan as needed.  Notified community service providers and placed services  Adult Day Care personal care attendant, Supplies Pullups and ensure elderly waiver transportation on hold as needed.  Transition log initiated.   PCP, Ray Sun, notified of hospitalization via EMR.  Layla Brewer RN,  PHN  [facility] Care Coordinator  [phone]      Transition #2    Notification Date: 7/3/25 7/2/25 Transition From: [facility]     Is this the member s usual care setting?               no Transition To: Home   Transition Type:  Planned    Documentation from conversation with the member/responsible party, provider, discharging and receiving facility:   Date: 7/3/25: Received notification of transition to home.  CC contacted adult daughter Efren and left a message requesting a return call.  Member has a follow-up appointment with PCP in 7 days: No: Offered Assistance with setting up a follow up appointment Member's spouse stated daughter schedules appointments  Member has had a change in condition: No member historically has a sedentary lifestyle per spouse, he is doing better but could still be stronger.  Home visit needed: No  Support Plan reviewed and updated.  The following home based services Notified community service providers and placed services [facility], Voicemail for [facility] personal care attendant, Rosetta Jorgensen Supplies Pullups and ensure elderly waiver, [facility] at [facility] transportation           Nora at [facility] stated member's account was closed, after review she stated member had not received an order for more than 6 months, she reactivated the account and was going to ship ensure 31 vanilla and 200 small pull ups to member today.  Discussed how to confirm order with language barrier, Nora stated they attempt to reach member's daughter Efren.                New referrals placed: Yes: Therapies: PT  Transition log completed.   PCP, Ray Sun, notified of transition back to home via EMR.  Mili Denise RN  [facility]  [phone]        *RETURN TO USUAL CARE SETTING: *Complete tasks below when the member is discharging TO their usual care setting within one (1) business day of notification..      For situations where the Care Coordinator is notified of the discharge prior to  the date of discharge, the Care Coordinator must follow up with the member or designated representative to confirm that discharge actually occurred and discuss required RYANNE tasks as outlined in the RYANNE Instructions.  (This includes situations where it may be a  new  usual care setting for the member. (i.e., a community member who decides upon permanent nursing home placement following hospitalization and rehab).    Discuss with Member/Responsible Party:    Check  Yes  - if the member, family member and/or SNF/facility staff manages the following:    If  No  provide explanation in the comments section.          Date completed: 7/3/25 Communicated with member or their designated representative about the following:  care transition process; about changes to the member s health status; plan of care updates; education about transitions and how to prevent unplanned transitions/readmissions    Four Pillars for Optimal Transition:    Check  Yes  - if the member, family member and/or SNF/facility staff manages the following:    If  No  provide explanation in the comments section.          []  Yes     [x]  No Does the member have a follow-up appointment scheduled with primary care or specialist? (Mental health hospitalizations--the appt. should be w/in 7 days)              For mental health hospitalizations:  []  Yes     []  No     Does the member have a follow-up appointment scheduled with a mental health practitioner within 7 days of discharge?  [x]  Yes     []  No     Has a medication review been completed with member? If no, refer to PCP, home care nurse, MTM, pharmacist  [x]  Yes     []  No     Can the member manage their medications or is there a system in place to manage medications (e.g. home care set-up)?         [x]  Yes     []  No     Can the member verbalize warning signs and symptoms to watch for and how to respond?  [x]  Yes     []  No     Does the member have a copy of and understand their discharge  instructions?  If no, assist to obtain copy of discharge instructions, review discharge instructions, and assist to contact PCP to discuss questions about their recent hospitalization.  [x]  Yes     []  No     Does the member have adequate food, housing and transportation?  If no, add goal and discuss additional supports available to the member                                                                                                                                                                                 [x]  Yes     []  No     Is the member safe in their home?  If no, document needs and support provided                                                                                                                                                                          []  Yes     [x]  No     Are there any concerns of vulnerability, abuse, or neglect?  If yes, document concerns and actions taken by Care Coordinator as a mandated                                                                                                                                                                              [x]  Yes     []  No     Does the member use a Personal Health Care Record?  Check  Yes  if visit summary, discharge summary, and/or healthcare summary are being used as a PHR.                                                                                                                                                                                  [x]  Yes     []  No     Have you reviewed the discharge summary with the member? If  No  provide explanation in comments.  [x]  Yes     []  No     Have you updated the member s care plan/support plan? Add new diagnosis, medications, treatments, goals & interventions, as applicable. If No, provide explanation in comments.    Comments:     Discharging facility reviewed medication changes with member and family, they are aware of discharge instructions and  report they are able to manage things.        Notes from conversation with the member/responsible party, provider, discharging and receiving facility (as applicable): 7/3/25 member discharged home, order for in home care was sent by discharging facility, reviewed contact number with member spouse via  if there is no call from home health within 2 days to call [phone] [facility].  Educated on importance of confirming monthly order for [employer] or shipment for pull ups and ensure will not be sent, spouse will advise daughter to call care coordinator to confirm any additional questions.  Mili Denise RN  [facility]  [phone]   7/3/25 daughter Efren called back, reviewed discharge summary, she will confirm monthly orders for activestyle to ensure member gets his supplies, she will do so.  Provided Efren with [facility] telephone number, she will call them today to get member scheduled as she will be out of town for the holiday.   Efren will ernie care coordinator Layla with additional questions.  Mili Denise RN  [facility]  [phone]

## 2025-07-01 ENCOUNTER — APPOINTMENT (OUTPATIENT)
Dept: PHYSICAL THERAPY | Facility: CLINIC | Age: 80
DRG: 872 | End: 2025-07-01
Attending: INTERNAL MEDICINE
Payer: COMMERCIAL

## 2025-07-01 LAB
ANION GAP SERPL CALCULATED.3IONS-SCNC: 11 MMOL/L (ref 7–15)
BUN SERPL-MCNC: 12.8 MG/DL (ref 8–23)
CALCIUM SERPL-MCNC: 8.5 MG/DL (ref 8.8–10.4)
CHLORIDE SERPL-SCNC: 105 MMOL/L (ref 98–107)
CREAT SERPL-MCNC: 0.89 MG/DL (ref 0.67–1.17)
EGFRCR SERPLBLD CKD-EPI 2021: 87 ML/MIN/1.73M2
ERYTHROCYTE [DISTWIDTH] IN BLOOD BY AUTOMATED COUNT: 15.4 % (ref 10–15)
GLUCOSE SERPL-MCNC: 109 MG/DL (ref 70–99)
HCO3 SERPL-SCNC: 21 MMOL/L (ref 22–29)
HCT VFR BLD AUTO: 38.7 % (ref 40–53)
HGB BLD-MCNC: 12.8 G/DL (ref 13.3–17.7)
MCH RBC QN AUTO: 24.7 PG (ref 26.5–33)
MCHC RBC AUTO-ENTMCNC: 33.1 G/DL (ref 31.5–36.5)
MCV RBC AUTO: 75 FL (ref 78–100)
PLATELET # BLD AUTO: 229 10E3/UL (ref 150–450)
POTASSIUM SERPL-SCNC: 4 MMOL/L (ref 3.4–5.3)
RBC # BLD AUTO: 5.18 10E6/UL (ref 4.4–5.9)
SODIUM SERPL-SCNC: 137 MMOL/L (ref 135–145)
WBC # BLD AUTO: 10 10E3/UL (ref 4–11)

## 2025-07-01 PROCEDURE — 250N000013 HC RX MED GY IP 250 OP 250 PS 637: Performed by: PHYSICIAN ASSISTANT

## 2025-07-01 PROCEDURE — 80048 BASIC METABOLIC PNL TOTAL CA: CPT | Performed by: INTERNAL MEDICINE

## 2025-07-01 PROCEDURE — 120N000001 HC R&B MED SURG/OB

## 2025-07-01 PROCEDURE — 97530 THERAPEUTIC ACTIVITIES: CPT | Mod: GP | Performed by: PHYSICAL THERAPIST

## 2025-07-01 PROCEDURE — 82310 ASSAY OF CALCIUM: CPT | Performed by: INTERNAL MEDICINE

## 2025-07-01 PROCEDURE — 250N000013 HC RX MED GY IP 250 OP 250 PS 637: Performed by: INTERNAL MEDICINE

## 2025-07-01 PROCEDURE — 97116 GAIT TRAINING THERAPY: CPT | Mod: GP | Performed by: PHYSICAL THERAPIST

## 2025-07-01 PROCEDURE — 85048 AUTOMATED LEUKOCYTE COUNT: CPT | Performed by: INTERNAL MEDICINE

## 2025-07-01 PROCEDURE — 36415 COLL VENOUS BLD VENIPUNCTURE: CPT | Performed by: INTERNAL MEDICINE

## 2025-07-01 PROCEDURE — 99233 SBSQ HOSP IP/OBS HIGH 50: CPT | Performed by: STUDENT IN AN ORGANIZED HEALTH CARE EDUCATION/TRAINING PROGRAM

## 2025-07-01 PROCEDURE — 85018 HEMOGLOBIN: CPT | Performed by: INTERNAL MEDICINE

## 2025-07-01 PROCEDURE — 97161 PT EVAL LOW COMPLEX 20 MIN: CPT | Mod: GP | Performed by: PHYSICAL THERAPIST

## 2025-07-01 PROCEDURE — 250N000013 HC RX MED GY IP 250 OP 250 PS 637: Performed by: STUDENT IN AN ORGANIZED HEALTH CARE EDUCATION/TRAINING PROGRAM

## 2025-07-01 RX ORDER — TAMSULOSIN HYDROCHLORIDE 0.4 MG/1
0.4 CAPSULE ORAL DAILY
Qty: 30 CAPSULE | Refills: 0 | Status: SHIPPED | OUTPATIENT
Start: 2025-07-02

## 2025-07-01 RX ORDER — LORAZEPAM 0.5 MG/1
0.25 TABLET ORAL
Status: COMPLETED | OUTPATIENT
Start: 2025-07-01 | End: 2025-07-02

## 2025-07-01 RX ORDER — CEFUROXIME AXETIL 500 MG/1
500 TABLET ORAL 2 TIMES DAILY
Qty: 6 TABLET | Refills: 0 | Status: SHIPPED | OUTPATIENT
Start: 2025-07-02 | End: 2025-07-01

## 2025-07-01 RX ORDER — LOSARTAN POTASSIUM 25 MG/1
25 TABLET ORAL DAILY
Status: DISCONTINUED | OUTPATIENT
Start: 2025-07-01 | End: 2025-07-02 | Stop reason: HOSPADM

## 2025-07-01 RX ORDER — FINASTERIDE 5 MG/1
5 TABLET, FILM COATED ORAL DAILY
Qty: 30 TABLET | Refills: 0 | Status: SHIPPED | OUTPATIENT
Start: 2025-07-02

## 2025-07-01 RX ORDER — CEFUROXIME AXETIL 500 MG/1
500 TABLET ORAL EVERY 12 HOURS SCHEDULED
Status: DISCONTINUED | OUTPATIENT
Start: 2025-07-01 | End: 2025-07-02 | Stop reason: HOSPADM

## 2025-07-01 RX ORDER — GABAPENTIN 100 MG/1
100 CAPSULE ORAL 3 TIMES DAILY
Qty: 60 CAPSULE | Refills: 0 | Status: SHIPPED | OUTPATIENT
Start: 2025-07-01

## 2025-07-01 RX ORDER — CEFUROXIME AXETIL 500 MG/1
500 TABLET ORAL 2 TIMES DAILY
Qty: 7 TABLET | Refills: 0 | Status: SHIPPED | OUTPATIENT
Start: 2025-07-01

## 2025-07-01 RX ORDER — CELECOXIB 50 MG/1
50 CAPSULE ORAL 2 TIMES DAILY
Qty: 14 CAPSULE | Refills: 0 | Status: SHIPPED | OUTPATIENT
Start: 2025-07-01

## 2025-07-01 RX ADMIN — CELECOXIB 50 MG: 50 CAPSULE ORAL at 21:45

## 2025-07-01 RX ADMIN — FINASTERIDE 5 MG: 5 TABLET, FILM COATED ORAL at 08:45

## 2025-07-01 RX ADMIN — GABAPENTIN 100 MG: 100 CAPSULE ORAL at 21:22

## 2025-07-01 RX ADMIN — LOSARTAN POTASSIUM 25 MG: 25 TABLET, FILM COATED ORAL at 16:06

## 2025-07-01 RX ADMIN — TAMSULOSIN HYDROCHLORIDE 0.4 MG: 0.4 CAPSULE ORAL at 08:45

## 2025-07-01 RX ADMIN — ACETAMINOPHEN 975 MG: 325 TABLET ORAL at 08:46

## 2025-07-01 RX ADMIN — GABAPENTIN 100 MG: 100 CAPSULE ORAL at 08:47

## 2025-07-01 RX ADMIN — GABAPENTIN 100 MG: 100 CAPSULE ORAL at 16:06

## 2025-07-01 RX ADMIN — CELECOXIB 50 MG: 50 CAPSULE ORAL at 09:56

## 2025-07-01 RX ADMIN — ACETAMINOPHEN 975 MG: 325 TABLET ORAL at 17:48

## 2025-07-01 RX ADMIN — CEFUROXIME AXETIL 500 MG: 500 TABLET, FILM COATED ORAL at 21:18

## 2025-07-01 RX ADMIN — CEFUROXIME AXETIL 500 MG: 500 TABLET, FILM COATED ORAL at 13:28

## 2025-07-01 ASSESSMENT — ACTIVITIES OF DAILY LIVING (ADL)
ADLS_ACUITY_SCORE: 34

## 2025-07-01 NOTE — PLAN OF CARE
"Toishanese speaking,  needed. Pt disoriented to time and situation. Clear lung sounds on RA. Pt somnolent. Regular diet. Pain with urination. Neurosurgery and PT consulted.Discharge TBD  Goal Outcome Evaluation:    Plan of Care Reviewed With: patient, spouse    Overall Patient Progress: improvingOverall Patient Progress: improving    Outcome Evaluation: Mild flank pain, tylenol refused by pt and spouse. Pt on RA.    Problem: Adult Inpatient Plan of Care  Goal: Plan of Care Review  Description: The Plan of Care Review/Shift note should be completed every shift.  The Outcome Evaluation is a brief statement about your assessment that the patient is improving, declining, or no change.  This information will be displayed automatically on your shift  note.  Outcome: Progressing  Flowsheets (Taken 7/1/2025 0518)  Outcome Evaluation: Mild flank pain, tylenol refused by pt and spouse. Pt on RA.  Plan of Care Reviewed With:   patient   spouse  Overall Patient Progress: improving  Goal: Patient-Specific Goal (Individualized)  Description: You can add care plan individualizations to a care plan. Examples of Individualization might be:  \"Parent requests to be called daily at 9am for status\", \"I have a hard time hearing out of my right ear\", or \"Do not touch me to wake me up as it startles  me\".  Outcome: Progressing  Goal: Absence of Hospital-Acquired Illness or Injury  Outcome: Progressing  Intervention: Identify and Manage Fall Risk  Recent Flowsheet Documentation  Taken 7/1/2025 0038 by Jose G Treadwell RN  Safety Promotion/Fall Prevention: safety round/check completed  Intervention: Prevent and Manage VTE (Venous Thromboembolism) Risk  Recent Flowsheet Documentation  Taken 7/1/2025 0038 by Jose G Treadwell RN  VTE Prevention/Management: patient refused intervention  Goal: Optimal Comfort and Wellbeing  Outcome: Progressing  Intervention: Monitor Pain and Promote Comfort  Recent Flowsheet Documentation  Taken " 7/1/2025 0210 by Jose G Treadwell RN  Pain Management Interventions: medication offered but refused  Goal: Readiness for Transition of Care  Outcome: Progressing

## 2025-07-01 NOTE — PLAN OF CARE
"Neurosurgery consulted (see note) lumbar MRI ordered (one time PRN ativan PO ordered for possible claustrophobia), switched to oral abx, resumed losartan, denies pian except on urination. Possible discharge tomorrow pending MRI        Goal Outcome Evaluation:    Plan of Care Reviewed With: patient, spouse, family  Overall Patient Progress: improvingOverall Patient Progress: improving  Outcome Evaluation: neurosurgery consulted, walked hallway with PT    Problem: Adult Inpatient Plan of Care  Goal: Plan of Care Review  Description: The Plan of Care Review/Shift note should be completed every shift.  The Outcome Evaluation is a brief statement about your assessment that the patient is improving, declining, or no change.  This information will be displayed automatically on your shift  note.  Outcome: Progressing  Flowsheets (Taken 7/1/2025 1706)  Outcome Evaluation: neurosurgery consulted, walked hallway with PT  Plan of Care Reviewed With:   patient   spouse   family  Overall Patient Progress: improving  Goal: Patient-Specific Goal (Individualized)  Description: You can add care plan individualizations to a care plan. Examples of Individualization might be:  \"Parent requests to be called daily at 9am for status\", \"I have a hard time hearing out of my right ear\", or \"Do not touch me to wake me up as it startles  me\".  Outcome: Progressing  Goal: Absence of Hospital-Acquired Illness or Injury  Outcome: Progressing  Intervention: Identify and Manage Fall Risk  Recent Flowsheet Documentation  Taken 7/1/2025 0847 by Marilou Alvarez, RN  Safety Promotion/Fall Prevention:   safety round/check completed   supervised activity  Intervention: Prevent Skin Injury  Recent Flowsheet Documentation  Taken 7/1/2025 0847 by Marilou Alvarez, RN  Body Position: position changed independently  Goal: Optimal Comfort and Wellbeing  Outcome: Progressing  Intervention: Monitor Pain and Promote Comfort  Recent Flowsheet Documentation  Taken 7/1/2025 " 0847 by Marilou Alvarez, RN  Pain Management Interventions:   medication (see MAR)   heat applied  Goal: Readiness for Transition of Care  Outcome: Progressing     Problem: Delirium  Goal: Optimal Coping  Outcome: Progressing  Goal: Improved Behavioral Control  Outcome: Progressing  Goal: Improved Attention and Thought Clarity  Outcome: Progressing  Goal: Improved Sleep  Outcome: Progressing     Problem: Infection  Goal: Absence of Infection Signs and Symptoms  Outcome: Progressing

## 2025-07-01 NOTE — PROGRESS NOTES
"[facility]    Medicine Progress Note - Hospitalist Service    Date of Admission:  6/29/2025    Assessment & Plan   Josh Gonzalez is a 79 year old male with PMH including NIDDM, KULWANT, HTN, MDD who was admitted on 6/29/2025 with pain with urination.      He had initially presented with UC with \"chief complaint of pain with urination for the past two days.  In addition, patient has felt dizzy (lightheadedness that comes and goes, worsened by the hiccup episodes, by the lack of sleep, the urinary pain ) and has had leg weakness (both legs, at sides of the legs, at the calves) and more instability since yesterday.\"     In the ED Tmax 100.7, vital signs stable.  Creat 1.06, Na 127.  WBC 25 with left shift. No lactic acidosis. Procal 0.48.  UA with small blood, moderate LE, will 81 WBCs, few bacteria.  CXR negative.  CT abdomen pelvis with mild diffuse bladder wall thickening, concerning for chronic bladder outlet obstruction in the setting of prostatomegaly and cystitis. No obstructing stones or issues with the kidneys.     NSG consulted and recommend MRI lumbar spine.  Discharge pending MRI results.       Dysuria  Possible UTI:  Patient presents with pain with urination.  Occurring for 2 days PTA.  CT A/P shows diffuse bladder wall thickening which is either concerning for chronic bladder wall outlet obstruction versus cystitis.  Urinalysis showed leukocyte esterase.  Will treat for urinary tract infection with 5-day course of cephalosporin.    - Transition to cefuroxime for 4 more days    LUTS  Diffuse bladder wall thickening:  Patient presented with lower urinary tract symptoms.  Evidence of BPH with chronic bladder outlet obstruction seen on CT imaging.  Bladder scan showed that patient was adequately voiding.  Treating for UTI as above.    -Referral to urology on discharge    Bilateral leg & low back pain  Severe spinal canal stenosis L4-5:  Patient presents with bilateral leg weakness.  Also with " bilateral low back pain.  CT imaging shows L4-L5 severe spinal canal stenosis.  Endorses occasional urinary incontinence for which he uses depends.  -Neurosurg consulted   - Recommend MRI of the lumbar spine with and without contrast  - PT/OT eval    Recurrent and persistent hiccups:  - Gabapentin  - PRN Thorazine    Dementia:  Wife previously reported longstanding cognitive/expressive/memory problems.  Family report that he is currently at his mental baseline.  -PT/OT eval    Hyponatremia, resolved: Encourage oral intake    Mild microcytic anemia    MDD          Diet: Combination Diet Regular Diet Adult  Diet    DVT Prophylaxis: Pneumatic Compression Devices  Land Catheter: Not present  Lines: None     Cardiac Monitoring: None  Code Status: Full Code      Clinically Significant Risk Factors         # Hyponatremia: Lowest Na = 133 mmol/L in last 2 days, will monitor as appropriate                          # Financial/Environmental Concerns: none         Social Drivers of Health    Depression: At risk (4/25/2024)    PHQ-2     PHQ-2 Score: 4   Tobacco Use: Medium Risk (6/29/2025)    Patient History     Smoking Tobacco Use: Former     Smokeless Tobacco Use: Never   Physical Activity: Inactive (3/25/2025)    Exercise Vital Sign     Days of Exercise per Week: 0 days     Minutes of Exercise per Session: 0 min   Social Connections: Unknown (3/25/2025)    Social Connection and Isolation Panel [NHANES]     Frequency of Communication with Friends and Family: More than three times a week     Frequency of Social Gatherings with Friends and Family: Once a week     Attends Zoroastrian Services: Never     Active Member of Clubs or Organizations: Yes     Attends Club or Organization Meetings: More than 4 times per year   Health Literacy: Inadequate Health Literacy (2/13/2025)     Health Literacy     Frequency of need for help with medical instructions: Always          Disposition Plan     Medically Ready for Discharge:  Anticipated Today             Carlitos Wilson DO  Hospitalist Service  [facility]  Securely message with SMS [employer] (more info)  Text page via [employer] Paging/Directory   ______________________________________________________________________    Interval History   No significant events overnight.  Patient is feeling better but does endorse ongoing lower urinary tract symptoms, dysuria.  Reports that it is improved since presentation.  His grandson is at bedside and interprets.  We discussed need for neurosurg evaluation given CT image findings.  Family otherwise reports that the patient is at his physical and mental baseline.    Physical Exam   Vital Signs: Temp: 98.3  F (36.8  C) Temp src: Oral BP: (!) 177/84 Pulse: 78   Resp: 18 SpO2: 98 % O2 Device: None (Room air)    Weight: 125 lbs 9.6 oz    General Appearance: Patient awake & alert.  No apparent distress.   Respiratory: Lungs are CTAB.  Work of breathing appears normal on room air.  Cardiovascular: Regular rate and rhythm.  No murmurs rubs or gallops.  There is no edema present.  GI: Benign.  Soft.  Non-tender.  Bowel sounds active.   Skin: No rashes or lesions exposed skin.  Neuro:  The patient is moving all extremities.  No obvious focal asymmetries.   Other: Patient is appropriate and oriented x3.     Medical Decision Making       55 MINUTES SPENT BY ME on the date of service doing chart review, history, exam, documentation & further activities per the note.      Data   ------------------------- PAST 24 HR DATA REVIEWED -----------------------------------------------    I have personally reviewed the following data over the past 24 hrs:    10.0  \   12.8 (L)   / 229     137 105 12.8 /  109 (H)   4.0 21 (L) 0.89 \       Imaging results reviewed over the past 24 hrs:   No results found for this or any previous visit (from the past 24 hours).

## 2025-07-01 NOTE — PROGRESS NOTES
07/01/25 1400   Appointment Info   Signing Clinician's Name / Credentials (PT) Cassy Ardon PT       Present yes   Living Environment   People in Home child(noemi), adult;child(noemi), dependent   Current Living Arrangements house   Home Accessibility stairs to enter home;stairs within home   Number of Stairs, Main Entrance 4   Stair Railings, Main Entrance railings safe and in good condition   Number of Stairs, Within Home, Primary eight   Stair Railings, Within Home, Primary railings safe and in good condition   Transportation Anticipated family or friend will provide   Self-Care   Usual Activity Tolerance moderate   Current Activity Tolerance moderate   Equipment Currently Used at Home cane, straight   Fall history within last six months no   Activity/Exercise/Self-Care Comment assistance with ADLs/IADLs. Daughter and wife does medication set up and reminders. [facility]   General Information   Onset of Illness/Injury or Date of Surgery 07/01/25   Pertinent History of Current Problem (include personal factors and/or comorbidities that impact the POC) 79 year old Cantonese speaking male with Pmhx of KULWANT, HTN, MDD, who was admitted on 6/29/2025 with concern of sepsis, UTI.   Existing Precautions/Restrictions fall   Cognition   Affect/Mental Status (Cognition) WFL   Orientation Status (Cognition) oriented x 3   Follows Commands (Cognition) WFL   Cognitive Status Comments [place]: repetition of instructions 2nd to    Range of Motion (ROM)   Range of Motion ROM is WFL   Strength (Manual Muscle Testing)   Strength (Manual Muscle Testing) Deficits observed during functional mobility   Strength Comments limited by pain   Bed Mobility   Comment, (Bed Mobility) attempted unable to complete per his usual way   Transfers   Comment, (Transfers) sit to stand w CGA and SEC   Gait/Stairs (Locomotion)   [place] (Gait) contact guard    Assistive Device (Gait) cane, straight   Distance in Feet (Gait) 10   Pattern (Gait) step-through   Deviations/Abnormal Patterns (Gait) shayne decreased;weight shifting decreased   Balance   Balance Comments dec LE strength and radicular pain, utilizing sEC for mobiltiy   Clinical Impression   Criteria for Skilled Therapeutic Intervention Yes, treatment indicated   PT Diagnosis (PT) Difficulty walking   Influenced by the following impairments dec strength impaired gait  pain   Functional limitations due to impairments impaired mobility   Clinical Presentation (PT Evaluation Complexity) stable   Clinical Presentation Rationale clincial judgement   Clinical Decision Making (Complexity) low complexity   Planned Therapy Interventions (PT) bed mobility training;gait training;strengthening;transfer training;progressive activity/exercise   Risk & Benefits of therapy have been explained evaluation/treatment results reviewed;care plan/treatment goals reviewed;risks/benefits reviewed   PT Discharge Planning   PT Discharge Recommendation (DC Rec) home with assist;home with home care physical therapy   PT Rationale for DC Rec Pt mobilizing well close to his baseline, spouse assists at home, probably more than she needs to, pt encourage to move self w cues for better body mechanics able transfer w SBA and cane or WW, decreased LE strength and overall deconditionned from lack of activity, R LE weaker than L                                                          Will benefit from PT during stay with cont home PT for   strengthening and moblity training

## 2025-07-01 NOTE — PLAN OF CARE
"[facility]  used for assessment. VSS. Pt somnolent. Dis to time and situation. LS clear on RA. Gave scheduled celebrex and gabapentin for mild to mod back pain with relief per pt. Family does not want pt to get tizanidine again due to lethargy after receiving. Pt still has some pain with urination along with urgency. Neurosurgery consulted.        Goal Outcome Evaluation:      Plan of Care Reviewed With: patient, spouse, child    Overall Patient Progress: no changeOverall Patient Progress: no change    Outcome Evaluation: Mild to mod back pain, gave scheduled meds. Somnolent.      Problem: Adult Inpatient Plan of Care  Goal: Plan of Care Review  Description: The Plan of Care Review/Shift note should be completed every shift.  The Outcome Evaluation is a brief statement about your assessment that the patient is improving, declining, or no change.  This information will be displayed automatically on your shift  note.  Outcome: Progressing  Flowsheets (Taken 6/30/2025 3136)  Outcome Evaluation: Mild to mod back pain, gave scheduled meds. Somnolent.  Plan of Care Reviewed With:   patient   spouse   child  Overall Patient Progress: no change  Goal: Patient-Specific Goal (Individualized)  Description: You can add care plan individualizations to a care plan. Examples of Individualization might be:  \"Parent requests to be called daily at 9am for status\", \"I have a hard time hearing out of my right ear\", or \"Do not touch me to wake me up as it startles  me\".  Outcome: Progressing  Goal: Absence of Hospital-Acquired Illness or Injury  Outcome: Progressing  Intervention: Identify and Manage Fall Risk  Recent Flowsheet Documentation  Taken 6/30/2025 2119 by Ingrid Roy, RN  Safety Promotion/Fall Prevention:   safety round/check completed   room organization consistent   patient and family education   nonskid shoes/slippers when out of bed   clutter free environment maintained   assistive device/personal items " within reach   activity supervised  Intervention: Prevent Skin Injury  Recent Flowsheet Documentation  Taken 6/30/2025 2119 by Ingrid Roy RN  Body Position: position changed independently  Intervention: Prevent and Manage VTE (Venous Thromboembolism) Risk  Recent Flowsheet Documentation  Taken 6/30/2025 2119 by Ingrid Roy RN  VTE Prevention/Management: patient refused intervention  Intervention: Prevent Infection  Recent Flowsheet Documentation  Taken 6/30/2025 2119 by Ingrid Roy RN  Infection Prevention:   environmental surveillance performed   equipment surfaces disinfected   hand hygiene promoted   personal protective equipment utilized   single patient room provided  Goal: Optimal Comfort and Wellbeing  Outcome: Progressing  Goal: Readiness for Transition of Care  Outcome: Progressing     Problem: Delirium  Goal: Optimal Coping  Outcome: Progressing  Goal: Improved Behavioral Control  Outcome: Progressing  Intervention: Minimize Safety Risk  Recent Flowsheet Documentation  Taken 6/30/2025 2119 by Ingrid Roy RN  Enhanced Safety Measures:   family to remain at bedside   patient/family teach back on injury risk   pain management  Goal: Improved Attention and Thought Clarity  Outcome: Progressing  Goal: Improved Sleep  Outcome: Progressing     Problem: Infection  Goal: Absence of Infection Signs and Symptoms  Outcome: Progressing

## 2025-07-02 ENCOUNTER — APPOINTMENT (OUTPATIENT)
Dept: MRI IMAGING | Facility: CLINIC | Age: 80
DRG: 872 | End: 2025-07-02
Attending: STUDENT IN AN ORGANIZED HEALTH CARE EDUCATION/TRAINING PROGRAM
Payer: COMMERCIAL

## 2025-07-02 ENCOUNTER — PATIENT OUTREACH (OUTPATIENT)
Dept: CARE COORDINATION | Facility: CLINIC | Age: 80
End: 2025-07-02
Payer: COMMERCIAL

## 2025-07-02 VITALS
TEMPERATURE: 98.3 F | OXYGEN SATURATION: 98 % | DIASTOLIC BLOOD PRESSURE: 72 MMHG | BODY MASS INDEX: 19.71 KG/M2 | RESPIRATION RATE: 18 BRPM | HEART RATE: 90 BPM | WEIGHT: 125.6 LBS | HEIGHT: 67 IN | SYSTOLIC BLOOD PRESSURE: 175 MMHG

## 2025-07-02 PROCEDURE — 250N000013 HC RX MED GY IP 250 OP 250 PS 637: Performed by: INTERNAL MEDICINE

## 2025-07-02 PROCEDURE — 99239 HOSP IP/OBS DSCHRG MGMT >30: CPT | Performed by: STUDENT IN AN ORGANIZED HEALTH CARE EDUCATION/TRAINING PROGRAM

## 2025-07-02 PROCEDURE — 250N000013 HC RX MED GY IP 250 OP 250 PS 637: Performed by: STUDENT IN AN ORGANIZED HEALTH CARE EDUCATION/TRAINING PROGRAM

## 2025-07-02 PROCEDURE — A9585 GADOBUTROL INJECTION: HCPCS | Performed by: STUDENT IN AN ORGANIZED HEALTH CARE EDUCATION/TRAINING PROGRAM

## 2025-07-02 PROCEDURE — 72158 MRI LUMBAR SPINE W/O & W/DYE: CPT

## 2025-07-02 PROCEDURE — 255N000002 HC RX 255 OP 636: Performed by: STUDENT IN AN ORGANIZED HEALTH CARE EDUCATION/TRAINING PROGRAM

## 2025-07-02 PROCEDURE — 99231 SBSQ HOSP IP/OBS SF/LOW 25: CPT | Performed by: PHYSICIAN ASSISTANT

## 2025-07-02 PROCEDURE — G2211 COMPLEX E/M VISIT ADD ON: HCPCS | Performed by: PHYSICIAN ASSISTANT

## 2025-07-02 PROCEDURE — 250N000013 HC RX MED GY IP 250 OP 250 PS 637: Performed by: PHYSICIAN ASSISTANT

## 2025-07-02 RX ORDER — GADOBUTROL 604.72 MG/ML
5.5 INJECTION INTRAVENOUS ONCE
Status: COMPLETED | OUTPATIENT
Start: 2025-07-02 | End: 2025-07-02

## 2025-07-02 RX ORDER — HYDRALAZINE HYDROCHLORIDE 10 MG/1
10 TABLET, FILM COATED ORAL ONCE
Status: COMPLETED | OUTPATIENT
Start: 2025-07-02 | End: 2025-07-02

## 2025-07-02 RX ADMIN — SERTRALINE HYDROCHLORIDE 50 MG: 50 TABLET ORAL at 08:38

## 2025-07-02 RX ADMIN — GADOBUTROL 5.5 ML: 604.72 INJECTION INTRAVENOUS at 02:18

## 2025-07-02 RX ADMIN — TAMSULOSIN HYDROCHLORIDE 0.4 MG: 0.4 CAPSULE ORAL at 08:37

## 2025-07-02 RX ADMIN — LOSARTAN POTASSIUM 25 MG: 25 TABLET, FILM COATED ORAL at 08:38

## 2025-07-02 RX ADMIN — FINASTERIDE 5 MG: 5 TABLET, FILM COATED ORAL at 08:38

## 2025-07-02 RX ADMIN — CEFUROXIME AXETIL 500 MG: 500 TABLET, FILM COATED ORAL at 08:38

## 2025-07-02 RX ADMIN — CELECOXIB 50 MG: 50 CAPSULE ORAL at 08:39

## 2025-07-02 RX ADMIN — GABAPENTIN 100 MG: 100 CAPSULE ORAL at 08:37

## 2025-07-02 RX ADMIN — ACETAMINOPHEN 975 MG: 325 TABLET ORAL at 01:12

## 2025-07-02 RX ADMIN — LORAZEPAM 0.25 MG: 0.5 TABLET ORAL at 01:12

## 2025-07-02 RX ADMIN — ACETAMINOPHEN 975 MG: 325 TABLET ORAL at 08:37

## 2025-07-02 RX ADMIN — HYDRALAZINE HYDROCHLORIDE 10 MG: 10 TABLET ORAL at 01:36

## 2025-07-02 ASSESSMENT — ACTIVITIES OF DAILY LIVING (ADL)
ADLS_ACUITY_SCORE: 34

## 2025-07-02 NOTE — PLAN OF CARE
"Physical Therapy Discharge Summary    Reason for therapy discharge:    Discharged to home with home therapy.    Progress towards therapy goal(s). See goals on Care Plan in [facility] electronic health record for goal details.  Goals not met.  Barriers to achieving goals:   discharge from facility.    Therapy recommendation(s):    Continued therapy is recommended.  Rationale/Recommendations:  Per prior PT recommendation, \"Pt mobilizing well close to his baseline, spouse assists at home, probably more than she needs to, pt encourage to move self w cues for better body mechanics able transfer w SBA and cane or WW, decreased LE strength and overall deconditionned from lack of activity, R LE weaker than L. Will benefit from PT during stay with cont home PT for strengthening and moblity training\".  "

## 2025-07-02 NOTE — PROVIDER NOTIFICATION
0112 Notified provider, Charles PAK, of BP of 192/88. Order for hydralazine placed and given. Bp rechecked approx 1 hr after administration. Bp significantly improved (see flowsheets)

## 2025-07-02 NOTE — PROGRESS NOTES
Notified of blood pressure of 192/88.  Blood pressure earlier today was 174/79.  Currently receiving PTA losartan.  Here with UTI and low back pain.  Patient also has dementia.  -Recheck BP in 1 hour and if still above 180 systolic will give 10 mg oral hydralazine

## 2025-07-02 NOTE — PLAN OF CARE
"Neurosurgery consulted (see note), PT supplied walker (see note).   Discharge meds and instructions reviewed with pt, spouse and children via [id number] , questions answered, belongings gathered and sent with pt who was transported home in private vehicle by family accompanied down by family    Goal Outcome Evaluation:    Plan of Care Reviewed With: patient, spouse, child  Overall Patient Progress: improvingOverall Patient Progress: improving  Outcome Evaluation: discahrged home with abx and neurosurgery consult    Problem: Adult Inpatient Plan of Care  Goal: Plan of Care Review  Description: The Plan of Care Review/Shift note should be completed every shift.  The Outcome Evaluation is a brief statement about your assessment that the patient is improving, declining, or no change.  This information will be displayed automatically on your shift  note.  Outcome: Adequate for Care Transition  Flowsheets (Taken 7/2/2025 5131)  Outcome Evaluation: discahrged home with abx and neurosurgery consult  Plan of Care Reviewed With:   patient   spouse   child  Overall Patient Progress: improving  Goal: Patient-Specific Goal (Individualized)  Description: You can add care plan individualizations to a care plan. Examples of Individualization might be:  \"Parent requests to be called daily at 9am for status\", \"I have a hard time hearing out of my right ear\", or \"Do not touch me to wake me up as it startles  me\".  Outcome: Adequate for Care Transition  Goal: Absence of Hospital-Acquired Illness or Injury  Outcome: Adequate for Care Transition  Intervention: Identify and Manage Fall Risk  Recent Flowsheet Documentation  Taken 7/2/2025 9625 by Marilou Alvarez RN  Safety Promotion/Fall Prevention: safety round/check completed  Goal: Optimal Comfort and Wellbeing  Outcome: Adequate for Care Transition  Goal: Readiness for Transition of Care  Outcome: Adequate for Care Transition     Problem: Delirium  Goal: Optimal Coping  Outcome: " Adequate for Care Transition  Goal: Improved Behavioral Control  Outcome: Adequate for Care Transition  Intervention: Minimize Safety Risk  Recent Flowsheet Documentation  Taken 7/2/2025 7607 by Marilou Alvarez RN  Enhanced Safety Measures: pain management  Goal: Improved Attention and Thought Clarity  Outcome: Adequate for Care Transition  Goal: Improved Sleep  Outcome: Adequate for Care Transition     Problem: Infection  Goal: Absence of Infection Signs and Symptoms  Outcome: Adequate for Care Transition

## 2025-07-02 NOTE — DISCHARGE SUMMARY
"[facility]  Hospitalist Discharge Summary      Date of Admission:  6/29/2025  Date of Discharge:  7/2/2025  5:47 PM  Discharging Provider: Carlitos Wilson DO  Discharge Service: Hospitalist Service    Discharge Diagnoses   Josh Goznalez is a 79 year old male with PMH including NIDDM, KULWANT, HTN, MDD who was admitted on 6/29/2025 with pain with urination.       He had initially presented with  with \"chief complaint of pain with urination for the past two days.  In addition, patient has felt dizzy (lightheadedness that comes and goes, worsened by the hiccup episodes, by the lack of sleep, the urinary pain ) and has had leg weakness (both legs, at sides of the legs, at the calves) and more instability since yesterday.\"      In the ED Tmax 100.7, vital signs stable.  Creat 1.06, Na 127.  WBC 25 with left shift. No lactic acidosis. Procal 0.48.  UA with small blood, moderate LE, will 81 WBCs, few bacteria.  CXR negative.  CT abdomen pelvis with mild diffuse bladder wall thickening, concerning for chronic bladder outlet obstruction in the setting of prostatomegaly and cystitis. No obstructing stones or issues with the kidneys.      NSG consulted and recommend MRI lumbar spine.  After MRI resulted, NSG recommend outpatient management.  Discharged home with family in stable condition.  Needs follow-up with PCP, urology, NSG.        Dysuria  Possible UTI:  Patient presents with pain with urination.  Occurring for 2 days PTA.  CT A/P shows diffuse bladder wall thickening which is either concerning for chronic bladder wall outlet obstruction versus cystitis.  Urinalysis showed leukocyte esterase.  Will treat for urinary tract infection with 5-day course of cephalosporin.    -Cefuroxime for 3 more days     LUTS  Diffuse bladder wall thickening:  Patient presented with lower urinary tract symptoms.  Evidence of BPH with chronic bladder outlet obstruction seen on CT imaging.  Bladder scan showed that patient " was adequately voiding.  Treating for UTI as above.    -Referral to urology on discharge     Bilateral leg & low back pain  Severe spinal canal stenosis L4-5:  Patient presents with bilateral leg weakness.  Also with bilateral low back pain.  CT imaging shows L4-L5 severe spinal canal stenosis.  Endorses occasional urinary incontinence for which he uses depends.  -Neurosurg consulted and recommend outpatient follow-up after MRI results  -MRI shows:   IMPRESSION:  1.  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  2.  Multilevel spondylosis superimposed on developmental canal narrowing with marked L4-L5 and moderate L3-L4 canal stenosis.  3.  Severe right L2-L3 and bilateral L3-L4 lateral recess stenoses.  4.  Severe right L3-L4 and moderate to severe bilateral L4-L5 foraminal stenoses.  5.  Focal enhancement of cauda equina nerve roots at L4-L5 is likely related to the marked canal stenosis at this level.  6.  Marrow edema in the bilateral L4 and L5 pedicles and facets with periarticular soft tissue inflammation, likely degenerative in etiology.  -Home cares     Recurrent and persistent hiccups:  -Gabapentin     Dementia:  Wife previously reported longstanding cognitive/expressive/memory problems.  Family report that he is currently at his mental baseline.  -PT/OT evaluated and rec home with discharge     Hyponatremia, resolved: Encourage oral intake     Mild microcytic anemia     MDD    Clinically Significant Risk Factors          Follow-ups Needed After Discharge   Follow-up Appointments       Hospital Follow-up with Existing Primary Care Provider (PCP)          Schedule Primary Care visit within: 7 Days               Unresulted Labs Ordered in the Past 30 Days of this Admission       Date and Time Order Name Status Description    6/29/2025 11:45 AM Blood Culture Peripheral blood (BC) Arm, Left Preliminary     6/29/2025 11:32 AM Blood Culture Peripheral blood (BC) Arm, Right Preliminary         These  results will be followed up by NA    Discharge Disposition   Discharged to home  Condition at discharge: Stable    Consultations This Hospital Stay   CARE MANAGEMENT / SOCIAL WORK IP CONSULT  PHYSICAL THERAPY ADULT IP CONSULT  NEUROSURGERY IP CONSULT    Code Status   Full Code    Time Spent on this Encounter   I, Carlitos Wilson DO, personally saw the patient today and spent greater than 30 minutes discharging this patient.       Carlitos Wilson DO  [place] [address] [facility]  [address] [facility] [address]  Phone: [phone]  Fax: [phone]  ______________________________________________________________________    Physical Exam   Vital Signs: Temp: 98.3  F (36.8  C) Temp src: Oral BP: (!) 175/72 Pulse: 90   Resp: 18 SpO2: 98 % O2 Device: None (Room air)    Weight: 125 lbs 9.6 oz  General Appearance: Patient awake & alert.  No apparent distress.   Respiratory: Lungs are CTAB.  Work of breathing appears normal on room air.  Cardiovascular: Regular rate and rhythm.  No murmurs rubs or gallops.  There is no edema present.  GI: Benign.  Soft.  Non-tender.  Bowel sounds active.   Skin: No rashes or lesions exposed skin.  Neuro:  The patient is moving all extremities.  No obvious focal asymmetries.   Other: Patient is appropriate and oriented x3.        Primary Care Physician   Ray Sun    Discharge Orders      Primary Care - Care Coordination Referral      Adult Urology  Referral      Home Care Referral      Reason for your hospital stay    You were hospitalized for pain with urination.  You were found to have a urinary tract infection.  CT imaging also showed findings concerning for chronic bladder outlet obstruction.  You were started on medications to help with urinating normally.     Activity    Your activity upon discharge: activity as tolerated     Walker Order     Diet    Follow this diet upon discharge: Regular     Hospital Follow-up with Existing Primary  Care Provider (PCP)            Significant Results and Procedures   Most Recent 3 CBC's:  Recent Labs   Lab Test 07/01/25  0703 06/30/25  0556 06/29/25  1144   WBC 10.0 19.8* 25.1*   HGB 12.8* 12.1* 12.6*   MCV 75* 75* 74*    196 197     Most Recent 3 BMP's:  Recent Labs   Lab Test 07/01/25  0703 06/30/25  0556 06/29/25  1144    133* 127*   POTASSIUM 4.0 3.8 4.0   CHLORIDE 105 102 92*   CO2 21* 21* 22   BUN 12.8 14.7 15.0   CR 0.89 0.96 1.06   ANIONGAP 11 10 13   RINA 8.5* 8.1* 8.9   * 122* 139*       Discharge Medications      Review of your medicines        START taking        Dose / Directions   cefuroxime 500 MG tablet  Commonly known as: CEFTIN  Used for: Acute cystitis without hematuria      Dose: 500 mg  Take 1 tablet (500 mg) by mouth 2 times daily.  Quantity: 7 tablet  Refills: 0     celecoxib 50 MG capsule  Commonly known as: celeBREX  Used for: Acute midline low back pain without sciatica      Dose: 50 mg  Take 1 capsule (50 mg) by mouth 2 times daily.  Quantity: 14 capsule  Refills: 0     finasteride 5 MG tablet  Commonly known as: PROSCAR  Used for: Lower urinary tract symptoms (LUTS)      Dose: 5 mg  Take 1 tablet (5 mg) by mouth daily.  Quantity: 30 tablet  Refills: 0     gabapentin 100 MG capsule  Commonly known as: NEURONTIN  Used for: Acute midline low back pain without sciatica      Dose: 100 mg  Take 1 capsule (100 mg) by mouth 3 times daily.  Quantity: 60 capsule  Refills: 0     tamsulosin 0.4 MG capsule  Commonly known as: FLOMAX  Used for: Lower urinary tract symptoms (LUTS)      Dose: 0.4 mg  Take 1 capsule (0.4 mg) by mouth daily.  Quantity: 30 capsule  Refills: 0            CONTINUE these medicines which have NOT CHANGED        Dose / Directions   acetaminophen 325 MG tablet  Commonly known as: Tylenol  Used for: Primary osteoarthritis of both knees, Chronic pain of both shoulders      Dose: 650 mg  Take 2 tablets (650 mg) by mouth every 6 hours as needed for mild  pain  Quantity: 100 tablet  Refills: 3     losartan 25 MG tablet  Commonly known as: COZAAR  Used for: Primary hypertension      Dose: 25 mg  Take 1 tablet (25 mg) by mouth daily.  Quantity: 90 tablet  Refills: 3     Multi-vitamin per tablet  Generic drug: multivitamin w/minerals      Dose: 1 tablet  Take 1 tablet by mouth daily.  Refills: 0     sertraline 50 MG tablet  Commonly known as: ZOLOFT  Used for: Recurrent major depressive disorder, in partial remission      Dose: 50 mg  Take 1 tablet (50 mg) by mouth daily.  Quantity: 90 tablet  Refills: 3     Vitamin D3 25 mcg (1000 units) tablet  Commonly known as: CHOLECALCIFEROL  Used for: Primary osteoarthritis of both knees, Chronic pain of both shoulders      Dose: 1 tablet  TAKE ONE TABLET BY MOUTH ONE TIME DAILY  Quantity: 90 tablet  Refills: 0               Where to get your medicines        These medications were sent to [facility] [address] [place] [id number]      Phone: [phone]   cefuroxime 500 MG tablet  celecoxib 50 MG capsule  finasteride 5 MG tablet  gabapentin 100 MG capsule  tamsulosin 0.4 MG capsule       Allergies   No Known Allergies

## 2025-07-02 NOTE — PLAN OF CARE
"[id number]  VSS on RA. Toishanese speaking, but can use Cantonese  to communicate since they are available 24/7. Denies pain, cp, n/v, sob. Wife helps with going to bathroom, steady. C/o dysuria. Waiting to go down for a Lumbar MRI. Neurology following.     Goal Outcome Evaluation:      Plan of Care Reviewed With: patient    Overall Patient Progress: improvingOverall Patient Progress: improving    Outcome Evaluation: Ambulating in room. Still c/o dysuria      Problem: Adult Inpatient Plan of Care  Goal: Plan of Care Review  Description: The Plan of Care Review/Shift note should be completed every shift.  The Outcome Evaluation is a brief statement about your assessment that the patient is improving, declining, or no change.  This information will be displayed automatically on your shift  note.  Outcome: Progressing  Flowsheets (Taken 7/1/2025 2244)  Outcome Evaluation: Ambulating in room. Still c/o dysuria  Plan of Care Reviewed With: patient  Overall Patient Progress: improving  Goal: Patient-Specific Goal (Individualized)  Description: You can add care plan individualizations to a care plan. Examples of Individualization might be:  \"Parent requests to be called daily at 9am for status\", \"I have a hard time hearing out of my right ear\", or \"Do not touch me to wake me up as it startles  me\".  Outcome: Progressing  Goal: Absence of Hospital-Acquired Illness or Injury  Outcome: Progressing  Intervention: Identify and Manage Fall Risk  Recent Flowsheet Documentation  Taken 7/1/2025 2120 by Rad Durand RN  Safety Promotion/Fall Prevention:   supervised activity   safety round/check completed   room organization consistent   room near nurse's station   patient and family education  Intervention: Prevent Skin Injury  Recent Flowsheet Documentation  Taken 7/1/2025 2120 by Rad Durand RN  Body Position: position changed independently  Intervention: Prevent Infection  Recent Flowsheet " Documentation  Taken 7/1/2025 2120 by Rad Durand RN  Infection Prevention:   rest/sleep promoted   single patient room provided  Goal: Optimal Comfort and Wellbeing  Outcome: Progressing  Intervention: Provide Person-Centered Care  Recent Flowsheet Documentation  Taken 7/1/2025 2120 by Rad Durand RN  Trust Relationship/Rapport:   care explained   choices provided   questions encouraged   questions answered  Goal: Readiness for Transition of Care  Outcome: Progressing     Problem: Delirium  Goal: Optimal Coping  Outcome: Progressing  Goal: Improved Behavioral Control  Outcome: Progressing  Intervention: Minimize Safety Risk  Recent Flowsheet Documentation  Taken 7/1/2025 2120 by Rad Durand RN  Enhanced Safety Measures:   family to remain at bedside   patient/family teach back on injury risk  Trust Relationship/Rapport:   care explained   choices provided   questions encouraged   questions answered  Goal: Improved Attention and Thought Clarity  Outcome: Progressing  Goal: Improved Sleep  Outcome: Progressing     Problem: Infection  Goal: Absence of Infection Signs and Symptoms  Outcome: Progressing

## 2025-07-02 NOTE — PLAN OF CARE
"[id number]   Pt is alert and oriented 4 on room air VSS x elevated Bps. Pt is independent/ SBA with cane. Pt is on regular diet. MRI performed on pt during this shift. Pt is on ABX regimen. No complaints of pain. PIV is CDI, no signs of inflammation or infiltration. Neuro consulted for numbness and tingling.  services used ([facility] [id number]). Discharge plan is still in progress. plan of care is continuing.      Goal Outcome Evaluation:      Plan of Care Reviewed With: patient    Overall Patient Progress: no change           Problem: Adult Inpatient Plan of Care  Goal: Plan of Care Review  Description: The Plan of Care Review/Shift note should be completed every shift.  The Outcome Evaluation is a brief statement about your assessment that the patient is improving, declining, or no change.  This information will be displayed automatically on your shift  note.  Outcome: Progressing  Flowsheets (Taken 7/2/2025 0619)  Plan of Care Reviewed With: patient  Overall Patient Progress: no change  Goal: Patient-Specific Goal (Individualized)  Description: You can add care plan individualizations to a care plan. Examples of Individualization might be:  \"Parent requests to be called daily at 9am for status\", \"I have a hard time hearing out of my right ear\", or \"Do not touch me to wake me up as it startles  me\".  Outcome: Progressing  Goal: Absence of Hospital-Acquired Illness or Injury  Outcome: Progressing  Intervention: Identify and Manage Fall Risk  Recent Flowsheet Documentation  Taken 7/2/2025 0112 by Alexa Abreu RN  Safety Promotion/Fall Prevention:   patient and family education   nonskid shoes/slippers when out of bed   safety round/check completed  Intervention: Prevent Skin Injury  Recent Flowsheet Documentation  Taken 7/2/2025 0112 by Alexa Abreu, RN  Body Position: position changed independently  Taken 7/2/2025 0110 by Alexa Abreu, RN  Body Position: position changed independently  Intervention: Prevent and " Manage VTE (Venous Thromboembolism) Risk  Recent Flowsheet Documentation  Taken 7/2/2025 0112 by Alexa Abreu, RN  VTE Prevention/Management: patient refused intervention  Intervention: Prevent Infection  Recent Flowsheet Documentation  Taken 7/2/2025 0112 by Alexa Abreu, RN  Infection Prevention:   rest/sleep promoted   single patient room provided  Goal: Optimal Comfort and Wellbeing  Outcome: Progressing  Goal: Readiness for Transition of Care  Outcome: Progressing     Problem: Delirium  Goal: Optimal Coping  Outcome: Progressing  Goal: Improved Behavioral Control  Outcome: Progressing  Goal: Improved Attention and Thought Clarity  Outcome: Progressing  Goal: Improved Sleep  Outcome: Progressing     Problem: Infection  Goal: Absence of Infection Signs and Symptoms  Outcome: Progressing

## 2025-07-02 NOTE — PROGRESS NOTES
Neurosurgery progress note    Patient states he is feeling better today, back pain leg pain improved.  Feels better from urinary tract infection.  Wife is helping with provide some of the history.    Exam    Alert and oriented no acute distress  Bilateral lower extremities with 5-5 strength      Assessment    Lumbar spinal stenosis at L4-5, severe    Plan    Patient's symptoms are improving, recommend he follow-up in neurosurgery clinic in the next 6 weeks to discuss his progress, and review treatment options as an outpatient.    Our clinic will call and arrange follow-up.    Neurosurgery will sign

## 2025-07-03 ENCOUNTER — PATIENT OUTREACH (OUTPATIENT)
Dept: CARE COORDINATION | Facility: CLINIC | Age: 80
End: 2025-07-03
Payer: COMMERCIAL

## 2025-07-03 LAB
BACTERIA SPEC CULT: NORMAL
BACTERIA SPEC CULT: NORMAL

## 2025-07-04 LAB
BACTERIA SPEC CULT: NO GROWTH
BACTERIA SPEC CULT: NO GROWTH

## 2025-07-07 ENCOUNTER — PATIENT OUTREACH (OUTPATIENT)
Dept: CARE COORDINATION | Facility: CLINIC | Age: 80
End: 2025-07-07

## 2025-07-08 ENCOUNTER — TELEPHONE (OUTPATIENT)
Dept: INTERNAL MEDICINE | Facility: CLINIC | Age: 80
End: 2025-07-08

## 2025-07-08 ENCOUNTER — VIRTUAL VISIT (OUTPATIENT)
Dept: INTERNAL MEDICINE | Facility: CLINIC | Age: 80
End: 2025-07-08
Payer: COMMERCIAL

## 2025-07-08 DIAGNOSIS — N39.0 SEPSIS DUE TO URINARY TRACT INFECTION (H): Primary | ICD-10-CM

## 2025-07-08 DIAGNOSIS — M48.061 SPINAL STENOSIS OF LUMBAR REGION, UNSPECIFIED WHETHER NEUROGENIC CLAUDICATION PRESENT: ICD-10-CM

## 2025-07-08 DIAGNOSIS — A41.9 SEPSIS DUE TO URINARY TRACT INFECTION (H): Primary | ICD-10-CM

## 2025-07-08 DIAGNOSIS — I72.3 ILIAC ARTERY ANEURYSM: ICD-10-CM

## 2025-07-08 DIAGNOSIS — R39.9 LOWER URINARY TRACT SYMPTOMS (LUTS): ICD-10-CM

## 2025-07-08 DIAGNOSIS — Z09 HOSPITAL DISCHARGE FOLLOW-UP: ICD-10-CM

## 2025-07-08 PROCEDURE — 1111F DSCHRG MED/CURRENT MED MERGE: CPT | Mod: 95 | Performed by: INTERNAL MEDICINE

## 2025-07-08 PROCEDURE — 98007 SYNCH AUDIO-VIDEO EST HI 40: CPT | Performed by: INTERNAL MEDICINE

## 2025-07-08 NOTE — PROGRESS NOTES
Man is a 79 year old who is being evaluated via a billable video visit.    How would you like to obtain your AVS? Mail a copy  If the video visit is dropped, the invitation should be resent by: Text to cell phone: [phone]  Will anyone else be joining your video visit? kaz         Assessment & Plan     (Z09) Hospital discharge follow-up  Plan: Patient states that he is having side effects of leg weakness with recently started medications gabapentin and Celebrex.  Patient has completed antibiotic for UTI and feeling better.  Patient would like to stop gabapentin and Celebrex.  Gabapentin was started for hiccups during hospitalization and patient states that her hiccups have resolved.  Okay to stop gabapentin and Celebrex at this time and continue to monitor    (A41.9,  N39.0) Sepsis due to urinary tract infection (H)    Plan: Patient has completed antibiotics, no dysuria      (R39.9) Lower urinary tract symptoms (LUTS)  Plan: Has been started on finasteride and Flomax, no urinary symptoms at this time, has appointment with urologist in few weeks      (I72.3) Iliac artery aneurysm  Plan: Vascular Medicine Referral          (M48.061) Spinal stenosis of lumbar region, unspecified whether neurogenic claudication present  Plan: Was evaluated by neurosurgery team during hospitalization and has been advised to follow-up as outpatient, patient has appointment with neurosurgery on 8/6/2025.         MED REC REQUIRED  Post Medication Reconciliation Status:  Discharge medications reconciled and changed, see notes/orders      56 minutes spent by me on the date of the encounter doing chart review, history and exam, documentation and further activities per the note      Subjective   Man is a 79 year old, presenting for the following health issues:  Hospital F/U (Patient reports having side effects from new medication but doesn't know which med. Pt nor wife knows what medications patient takes-daughter not available until  9pm)      7/8/2025     2:30 PM   Additional Questions   Roomed by maggie   Accompanied by wife (Lissette) daughter and grand son ( marino)          7/8/2025     2:30 PM   Patient Reported Additional Medications   Patient reports taking the following new medications Ceftin, celebrex, Proscar, Neurotin, Flomax     Video Start Time: 4:46 PM    [facility]        Hospital Follow-up Visit:    Hospital/Nursing Home/IP Rehab Facility: [facility]  Most Recent Admission Date: 6/29/2025   Most Recent Admission Diagnosis: Iliac artery aneurysm - I72.3  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 Health Concerns, medication side effects    Problems taking medications regularly:  None  Medication changes since discharge: Ceftin, celebrex, Proscar, Neurotin, Flomax   Problems adhering to non-medication therapy:  side effects (pt unsure what med)    Summary of hospitalization:  [facility] discharge summary reviewed  Diagnostic Tests/Treatments reviewed.  Follow up needed:   Other Healthcare Providers Involved in Patient s Care:         Specialist appointment - neurosurgery, urologist, home care    Update since discharge: Additional issues: pt/ family states that pt  feels weak in his legs since starting gabapentin and Celebrex,  gabapentin was started for hiccups but pt states hiccups has resolved and would like to stop the meds.          Plan of care communicated with  patient and family           Brief synopsis of hospitalization  Man Yamilet Gonzalez is a 79 year old male with PMH including NIDDM, KULWANT, HTN, MDD who was admitted on 6/29/2025 with pain with urination.     Dysuria/Possible UTI: CT A/P shows diffuse bladder wall thickening which is either concerning for chronic bladder wall outlet obstruction versus cystitis.  Treated with 5-day course of cephalosporin.    -Cefuroxime for 3 more days     LUTS/Diffuse bladder wall thickening:  Evidence of BPH with chronic bladder outlet obstruction seen on CT imaging.  Bladder scan showed that patient was adequately voiding. , has been started on Flomax and finasteride   Has appt with urologist 08/25     Bilateral leg & low back pain/Severe spinal canal stenosis L4-5: with bilateral leg weakness.    -Neurosurg consulted and recommend outpatient follow-up after MRI results    Recurrent and persistent hiccups: on gabapentin    CT abd-  Occluded right common iliac artery with reconstitution of the right external and internal iliac arteries via collateral flow. 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      Past Medical History:   Diagnosis Date    Depression     Depressive disorder     Mixed incontinence 12/20/2016    Myalgia     KULWANT on CPAP     Postnasal drip     ROTATOR CUFF (CAPSULE) SPRAIN        Current Outpatient Medications   Medication Sig Dispense Refill    acetaminophen (TYLENOL) 325 MG tablet Take 2 tablets (650 mg) by mouth every 6 hours as needed for mild pain 100 tablet 3    cefuroxime (CEFTIN) 500 MG tablet Take 1 tablet (500 mg) by mouth 2 times daily. 7 tablet 0    celecoxib (CELEBREX) 50 MG capsule Take 1 capsule (50 mg) by mouth 2 times daily. 14 capsule 0    finasteride (PROSCAR) 5 MG tablet Take 1 tablet (5 mg) by mouth daily. 30 tablet 0    gabapentin (NEURONTIN) 100 MG capsule Take 1 capsule (100 mg) by mouth 3 times daily. 60 capsule 0    losartan (COZAAR) 25 MG tablet Take 1 tablet (25 mg) by mouth daily. 90  tablet 3    multivitamin w/minerals (MULTI-VITAMIN) tablet Take 1 tablet by mouth daily.      sertraline (ZOLOFT) 50 MG tablet Take 1 tablet (50 mg) by mouth daily. 90 tablet 3    tamsulosin (FLOMAX) 0.4 MG capsule Take 1 capsule (0.4 mg) by mouth daily. 30 capsule 0    Vitamin D3 (CHOLECALCIFEROL) 25 mcg (1000 units) tablet TAKE ONE TABLET BY MOUTH ONE TIME DAILY 90 tablet 0            Review of Systems  CONSTITUTIONAL: NEGATIVE for fever, chills,   ENT/MOUTH:hiccups have resolved   RESP: NEGATIVE for significant cough or SOB  CV: NEGATIVE for chest pain, palpitations or peripheral edema  : BPH  MUSCULOSKELETAL: Back pain      Objective           Vitals:  No vitals were obtained today due to virtual visit.    Physical Exam   GENERAL: alert and no distress, sitting on chair- grandson interpreting .  EYES: Eyes grossly normal to inspection.  No discharge or erythema, or obvious scleral/conjunctival abnormalities.  RESP: No audible wheeze, cough, or visible cyanosis.           Video-Visit Details    Type of service:  Video Visit   Video End Time:5:05 PM  Originating Location (pt. Location): Home    Distant Location (provider location):  On-site  Platform used for Video Visit: Amelia  Signed Electronically by: Demi Mathis MD

## 2025-07-08 NOTE — TELEPHONE ENCOUNTER
Rosalina PT with [facility] is calling regarding an established patient with [facility].  Requesting orders from: Ray Sun RN APPROVED: RN able to provide verbal orders.  Home Care will send orders for signature.  RN will close encounter.  Is this a request for a temporary pause in the home care episode?  No    Orders Requested    Physical Therapy  Request for delay in care, service to be provided within 7 days of previously   Service rescheduled from today to tomorrow, NO  available today but confirmed  for tomorrow.     RN gave verbal order: Yes    Phone number Home Care can be reached at: [phone]  Okay to leave a detailed message?: Yes    Juhi Tao RN

## 2025-07-09 ENCOUNTER — MEDICAL CORRESPONDENCE (OUTPATIENT)
Dept: HEALTH INFORMATION MANAGEMENT | Facility: CLINIC | Age: 80
End: 2025-07-09

## 2025-07-09 ENCOUNTER — TELEPHONE (OUTPATIENT)
Dept: OTHER | Facility: CLINIC | Age: 80
End: 2025-07-09
Payer: COMMERCIAL

## 2025-07-09 NOTE — TELEPHONE ENCOUNTER
Referral received via [employer] on 7/8/25.    Referred by Dr. Demi Mathis for iliac artery aneurysm    Previous imaging completed (pertinent to referral):  6/29/25 - CT abd pelvis    Routing to scheduling to coordinate the following:  NEW VASCULAR PATIENT consult with Vascular Medicine  Please schedule this at next available    Appt note:  Referred by Dr. Demi Mathis for iliac artery aneurysm    Henrietta Corley RN  [facility]  Office: [phone]  Fax: [phone]

## 2025-07-14 ENCOUNTER — TRANSFERRED RECORDS (OUTPATIENT)
Dept: HEALTH INFORMATION MANAGEMENT | Facility: CLINIC | Age: 80
End: 2025-07-14
Payer: COMMERCIAL

## 2025-07-22 ENCOUNTER — TELEPHONE (OUTPATIENT)
Dept: INTERNAL MEDICINE | Facility: CLINIC | Age: 80
End: 2025-07-22
Payer: COMMERCIAL

## 2025-07-24 ENCOUNTER — TELEPHONE (OUTPATIENT)
Dept: INTERNAL MEDICINE | Facility: CLINIC | Age: 80
End: 2025-07-24
Payer: COMMERCIAL

## 2025-08-05 ENCOUNTER — TELEPHONE (OUTPATIENT)
Dept: NEUROSURGERY | Facility: CLINIC | Age: 80
End: 2025-08-05
Payer: COMMERCIAL

## 2025-08-06 ENCOUNTER — OFFICE VISIT (OUTPATIENT)
Dept: NEUROSURGERY | Facility: CLINIC | Age: 80
End: 2025-08-06
Attending: STUDENT IN AN ORGANIZED HEALTH CARE EDUCATION/TRAINING PROGRAM
Payer: COMMERCIAL

## 2025-08-06 VITALS
OXYGEN SATURATION: 100 % | BODY MASS INDEX: 19.62 KG/M2 | HEART RATE: 63 BPM | SYSTOLIC BLOOD PRESSURE: 127 MMHG | HEIGHT: 67 IN | DIASTOLIC BLOOD PRESSURE: 74 MMHG | WEIGHT: 125 LBS

## 2025-08-06 DIAGNOSIS — R29.898 LEG WEAKNESS, BILATERAL: ICD-10-CM

## 2025-08-06 DIAGNOSIS — M54.10 RADICULAR LOW BACK PAIN: ICD-10-CM

## 2025-08-06 DIAGNOSIS — M54.42 ACUTE BILATERAL LOW BACK PAIN WITH BILATERAL SCIATICA: Primary | ICD-10-CM

## 2025-08-06 DIAGNOSIS — M54.41 ACUTE BILATERAL LOW BACK PAIN WITH BILATERAL SCIATICA: Primary | ICD-10-CM

## 2025-08-06 PROCEDURE — 3078F DIAST BP <80 MM HG: CPT | Performed by: PHYSICIAN ASSISTANT

## 2025-08-06 PROCEDURE — 1125F AMNT PAIN NOTED PAIN PRSNT: CPT | Performed by: PHYSICIAN ASSISTANT

## 2025-08-06 PROCEDURE — 3074F SYST BP LT 130 MM HG: CPT | Performed by: PHYSICIAN ASSISTANT

## 2025-08-06 PROCEDURE — 99203 OFFICE O/P NEW LOW 30 MIN: CPT | Performed by: PHYSICIAN ASSISTANT

## 2025-08-06 PROCEDURE — G0463 HOSPITAL OUTPT CLINIC VISIT: HCPCS | Performed by: PHYSICIAN ASSISTANT

## 2025-08-06 RX ORDER — ACETAMINOPHEN 500 MG
500-1000 TABLET ORAL EVERY 6 HOURS PRN
Qty: 60 TABLET | Refills: 1 | Status: SHIPPED | OUTPATIENT
Start: 2025-08-06

## 2025-08-06 RX ORDER — LIDOCAINE 4 G/G
1 PATCH TOPICAL EVERY 24 HOURS
Qty: 30 PATCH | Refills: 1 | Status: SHIPPED | OUTPATIENT
Start: 2025-08-06

## 2025-08-06 ASSESSMENT — PAIN SCALES - GENERAL: PAINLEVEL_OUTOF10: SEVERE PAIN (7)

## 2025-08-07 ENCOUNTER — PATIENT OUTREACH (OUTPATIENT)
Dept: CARE COORDINATION | Facility: CLINIC | Age: 80
End: 2025-08-07
Payer: COMMERCIAL

## 2025-08-11 ENCOUNTER — PATIENT OUTREACH (OUTPATIENT)
Dept: CARE COORDINATION | Facility: CLINIC | Age: 80
End: 2025-08-11
Payer: COMMERCIAL

## 2025-08-13 ENCOUNTER — PATIENT OUTREACH (OUTPATIENT)
Dept: GERIATRIC MEDICINE | Facility: CLINIC | Age: 80
End: 2025-08-13
Payer: COMMERCIAL

## 2025-08-19 ENCOUNTER — VIRTUAL VISIT (OUTPATIENT)
Dept: UROLOGY | Facility: CLINIC | Age: 80
End: 2025-08-19
Attending: STUDENT IN AN ORGANIZED HEALTH CARE EDUCATION/TRAINING PROGRAM
Payer: COMMERCIAL

## 2025-08-19 DIAGNOSIS — R39.9 LOWER URINARY TRACT SYMPTOMS (LUTS): Primary | ICD-10-CM

## 2025-08-19 PROCEDURE — 1126F AMNT PAIN NOTED NONE PRSNT: CPT | Mod: 95 | Performed by: NURSE PRACTITIONER

## 2025-08-19 PROCEDURE — 98002 SYNCH AUDIO-VIDEO NEW MOD 45: CPT | Performed by: NURSE PRACTITIONER

## 2025-08-19 RX ORDER — TAMSULOSIN HYDROCHLORIDE 0.4 MG/1
0.4 CAPSULE ORAL DAILY
Qty: 90 CAPSULE | Refills: 4 | Status: SHIPPED | OUTPATIENT
Start: 2025-08-19

## 2025-08-19 RX ORDER — FINASTERIDE 5 MG/1
5 TABLET, FILM COATED ORAL DAILY
Qty: 90 TABLET | Refills: 4 | Status: SHIPPED | OUTPATIENT
Start: 2025-08-19

## 2025-08-20 ENCOUNTER — TELEPHONE (OUTPATIENT)
Dept: INTERNAL MEDICINE | Facility: CLINIC | Age: 80
End: 2025-08-20
Payer: COMMERCIAL

## 2025-08-26 ENCOUNTER — OFFICE VISIT (OUTPATIENT)
Dept: OTHER | Facility: CLINIC | Age: 80
End: 2025-08-26
Attending: INTERNAL MEDICINE
Payer: COMMERCIAL

## 2025-08-26 DIAGNOSIS — Z53.9 NO SHOW: Primary | ICD-10-CM

## (undated) DEVICE — KIT ENDO TURNOVER/PROCEDURE W/CLEAN A SCOPE LINERS 103888

## (undated) RX ORDER — FENTANYL CITRATE 50 UG/ML
INJECTION, SOLUTION INTRAMUSCULAR; INTRAVENOUS
Status: DISPENSED
Start: 2018-12-06